# Patient Record
Sex: FEMALE | Race: WHITE
[De-identification: names, ages, dates, MRNs, and addresses within clinical notes are randomized per-mention and may not be internally consistent; named-entity substitution may affect disease eponyms.]

---

## 2022-02-17 ENCOUNTER — HOSPITAL ENCOUNTER (INPATIENT)
Dept: HOSPITAL 54 - ER | Age: 77
LOS: 2 days | Discharge: HOME | DRG: 689 | End: 2022-02-19
Attending: NURSE PRACTITIONER | Admitting: NURSE PRACTITIONER
Payer: MEDICARE

## 2022-02-17 VITALS — SYSTOLIC BLOOD PRESSURE: 145 MMHG | DIASTOLIC BLOOD PRESSURE: 89 MMHG

## 2022-02-17 VITALS — SYSTOLIC BLOOD PRESSURE: 161 MMHG | DIASTOLIC BLOOD PRESSURE: 83 MMHG

## 2022-02-17 VITALS — BODY MASS INDEX: 21.6 KG/M2 | WEIGHT: 110 LBS | HEIGHT: 60 IN

## 2022-02-17 DIAGNOSIS — Z86.73: ICD-10-CM

## 2022-02-17 DIAGNOSIS — F17.200: ICD-10-CM

## 2022-02-17 DIAGNOSIS — E78.5: ICD-10-CM

## 2022-02-17 DIAGNOSIS — N39.0: ICD-10-CM

## 2022-02-17 DIAGNOSIS — R73.9: ICD-10-CM

## 2022-02-17 DIAGNOSIS — E88.09: ICD-10-CM

## 2022-02-17 DIAGNOSIS — I49.9: ICD-10-CM

## 2022-02-17 DIAGNOSIS — E86.0: ICD-10-CM

## 2022-02-17 DIAGNOSIS — N12: Primary | ICD-10-CM

## 2022-02-17 DIAGNOSIS — F41.9: ICD-10-CM

## 2022-02-17 DIAGNOSIS — Z79.02: ICD-10-CM

## 2022-02-17 DIAGNOSIS — J98.11: ICD-10-CM

## 2022-02-17 DIAGNOSIS — I10: ICD-10-CM

## 2022-02-17 DIAGNOSIS — N20.0: ICD-10-CM

## 2022-02-17 DIAGNOSIS — G93.41: ICD-10-CM

## 2022-02-17 DIAGNOSIS — K44.9: ICD-10-CM

## 2022-02-17 DIAGNOSIS — F01.50: ICD-10-CM

## 2022-02-17 DIAGNOSIS — D68.59: ICD-10-CM

## 2022-02-17 DIAGNOSIS — Z79.899: ICD-10-CM

## 2022-02-17 DIAGNOSIS — E83.42: ICD-10-CM

## 2022-02-17 DIAGNOSIS — B96.21: ICD-10-CM

## 2022-02-17 DIAGNOSIS — Z87.81: ICD-10-CM

## 2022-02-17 DIAGNOSIS — G89.29: ICD-10-CM

## 2022-02-17 DIAGNOSIS — M41.9: ICD-10-CM

## 2022-02-17 DIAGNOSIS — Z86.79: ICD-10-CM

## 2022-02-17 DIAGNOSIS — I77.811: ICD-10-CM

## 2022-02-17 DIAGNOSIS — I70.0: ICD-10-CM

## 2022-02-17 DIAGNOSIS — E44.1: ICD-10-CM

## 2022-02-17 DIAGNOSIS — Z98.890: ICD-10-CM

## 2022-02-17 DIAGNOSIS — M48.54XA: ICD-10-CM

## 2022-02-17 DIAGNOSIS — Z20.822: ICD-10-CM

## 2022-02-17 DIAGNOSIS — Z79.82: ICD-10-CM

## 2022-02-17 LAB
ALBUMIN SERPL BCP-MCNC: 3.6 G/DL (ref 3.4–5)
ALP SERPL-CCNC: 78 U/L (ref 46–116)
ALT SERPL W P-5'-P-CCNC: 29 U/L (ref 12–78)
AST SERPL W P-5'-P-CCNC: 24 U/L (ref 15–37)
BASOPHILS # BLD AUTO: 0.1 K/UL (ref 0–0.2)
BASOPHILS NFR BLD AUTO: 0.5 % (ref 0–2)
BILIRUB DIRECT SERPL-MCNC: 0.1 MG/DL (ref 0–0.2)
BILIRUB SERPL-MCNC: 0.6 MG/DL (ref 0.2–1)
BILIRUB UR QL STRIP: (no result)
BUN SERPL-MCNC: 16 MG/DL (ref 7–18)
CALCIUM SERPL-MCNC: 10.2 MG/DL (ref 8.5–10.1)
CHLORIDE SERPL-SCNC: 103 MMOL/L (ref 98–107)
CO2 SERPL-SCNC: 29 MMOL/L (ref 21–32)
COLOR UR: YELLOW
CREAT SERPL-MCNC: 0.9 MG/DL (ref 0.6–1.3)
EOSINOPHIL NFR BLD AUTO: 1 % (ref 0–6)
GLUCOSE SERPL-MCNC: 143 MG/DL (ref 74–106)
GLUCOSE UR STRIP-MCNC: NEGATIVE MG/DL
HCT VFR BLD AUTO: 39 % (ref 33–45)
HGB BLD-MCNC: 13.1 G/DL (ref 11.5–14.8)
LEUKOCYTE ESTERASE UR QL STRIP: (no result)
LYMPHOCYTES NFR BLD AUTO: 18.8 % (ref 20–44)
LYMPHOCYTES NFR BLD AUTO: 2.1 K/UL (ref 0.8–4.8)
MCHC RBC AUTO-ENTMCNC: 33 G/DL (ref 31–36)
MCV RBC AUTO: 87 FL (ref 82–100)
MONOCYTES NFR BLD AUTO: 0.8 K/UL (ref 0.1–1.3)
MONOCYTES NFR BLD AUTO: 7.5 % (ref 2–12)
NEUTROPHILS # BLD AUTO: 7.9 K/UL (ref 1.8–8.9)
NEUTROPHILS NFR BLD AUTO: 72.2 % (ref 43–81)
NITRITE UR QL STRIP: POSITIVE
PH UR STRIP: 6 [PH] (ref 5–8)
PLATELET # BLD AUTO: 300 K/UL (ref 150–450)
POTASSIUM SERPL-SCNC: 4.1 MMOL/L (ref 3.5–5.1)
PROT SERPL-MCNC: 7.8 G/DL (ref 6.4–8.2)
PROT UR QL STRIP: 30 MG/DL
RBC # BLD AUTO: 4.51 MIL/UL (ref 4–5.2)
RBC #/AREA URNS HPF: (no result) /HPF (ref 0–2)
SODIUM SERPL-SCNC: 140 MMOL/L (ref 136–145)
UROBILINOGEN UR STRIP-MCNC: 0.2 EU/DL
WBC #/AREA URNS HPF: (no result) /HPF (ref 0–3)
WBC NRBC COR # BLD AUTO: 11 K/UL (ref 4.3–11)

## 2022-02-17 PROCEDURE — G0378 HOSPITAL OBSERVATION PER HR: HCPCS

## 2022-02-17 PROCEDURE — C9803 HOPD COVID-19 SPEC COLLECT: HCPCS

## 2022-02-17 RX ADMIN — ATORVASTATIN CALCIUM SCH MG: 40 TABLET, FILM COATED ORAL at 21:57

## 2022-02-17 RX ADMIN — DEXTROSE MONOHYDRATE SCH MLS/HR: 50 INJECTION, SOLUTION INTRAVENOUS at 17:45

## 2022-02-17 RX ADMIN — ENOXAPARIN SODIUM SCH MG: 40 INJECTION SUBCUTANEOUS at 17:46

## 2022-02-18 VITALS — DIASTOLIC BLOOD PRESSURE: 70 MMHG | SYSTOLIC BLOOD PRESSURE: 152 MMHG

## 2022-02-18 VITALS — DIASTOLIC BLOOD PRESSURE: 77 MMHG | SYSTOLIC BLOOD PRESSURE: 156 MMHG

## 2022-02-18 VITALS — SYSTOLIC BLOOD PRESSURE: 145 MMHG | DIASTOLIC BLOOD PRESSURE: 82 MMHG

## 2022-02-18 VITALS — SYSTOLIC BLOOD PRESSURE: 146 MMHG | DIASTOLIC BLOOD PRESSURE: 73 MMHG

## 2022-02-18 VITALS — DIASTOLIC BLOOD PRESSURE: 82 MMHG | SYSTOLIC BLOOD PRESSURE: 145 MMHG

## 2022-02-18 VITALS — DIASTOLIC BLOOD PRESSURE: 65 MMHG | SYSTOLIC BLOOD PRESSURE: 136 MMHG

## 2022-02-18 VITALS — DIASTOLIC BLOOD PRESSURE: 76 MMHG | SYSTOLIC BLOOD PRESSURE: 170 MMHG

## 2022-02-18 LAB
ALBUMIN SERPL BCP-MCNC: 3.1 G/DL (ref 3.4–5)
ALP SERPL-CCNC: 70 U/L (ref 46–116)
ALT SERPL W P-5'-P-CCNC: 25 U/L (ref 12–78)
AST SERPL W P-5'-P-CCNC: 21 U/L (ref 15–37)
BASOPHILS # BLD AUTO: 0.1 K/UL (ref 0–0.2)
BASOPHILS NFR BLD AUTO: 0.6 % (ref 0–2)
BILIRUB SERPL-MCNC: 0.6 MG/DL (ref 0.2–1)
BUN SERPL-MCNC: 12 MG/DL (ref 7–18)
CALCIUM SERPL-MCNC: 9.3 MG/DL (ref 8.5–10.1)
CHLORIDE SERPL-SCNC: 104 MMOL/L (ref 98–107)
CHOLEST SERPL-MCNC: 220 MG/DL (ref ?–200)
CO2 SERPL-SCNC: 22 MMOL/L (ref 21–32)
CREAT SERPL-MCNC: 0.7 MG/DL (ref 0.6–1.3)
EOSINOPHIL NFR BLD AUTO: 1.3 % (ref 0–6)
GLUCOSE SERPL-MCNC: 100 MG/DL (ref 74–106)
HCT VFR BLD AUTO: 38 % (ref 33–45)
HDLC SERPL-MCNC: 47 MG/DL (ref 40–60)
HGB BLD-MCNC: 12.8 G/DL (ref 11.5–14.8)
LDLC SERPL DIRECT ASSAY-MCNC: 142 MG/DL (ref 0–99)
LYMPHOCYTES NFR BLD AUTO: 2 K/UL (ref 0.8–4.8)
LYMPHOCYTES NFR BLD AUTO: 21.2 % (ref 20–44)
MAGNESIUM SERPL-MCNC: 1.6 MG/DL (ref 1.8–2.4)
MCHC RBC AUTO-ENTMCNC: 34 G/DL (ref 31–36)
MCV RBC AUTO: 87 FL (ref 82–100)
MONOCYTES NFR BLD AUTO: 0.9 K/UL (ref 0.1–1.3)
MONOCYTES NFR BLD AUTO: 9.5 % (ref 2–12)
NEUTROPHILS # BLD AUTO: 6.4 K/UL (ref 1.8–8.9)
NEUTROPHILS NFR BLD AUTO: 67.4 % (ref 43–81)
PHOSPHATE SERPL-MCNC: 3.4 MG/DL (ref 2.5–4.9)
PLATELET # BLD AUTO: 277 K/UL (ref 150–450)
POTASSIUM SERPL-SCNC: 3.6 MMOL/L (ref 3.5–5.1)
PROT SERPL-MCNC: 7.1 G/DL (ref 6.4–8.2)
RBC # BLD AUTO: 4.35 MIL/UL (ref 4–5.2)
SODIUM SERPL-SCNC: 138 MMOL/L (ref 136–145)
TRIGL SERPL-MCNC: 132 MG/DL (ref 30–150)
WBC NRBC COR # BLD AUTO: 9.5 K/UL (ref 4.3–11)

## 2022-02-18 RX ADMIN — ACETAMINOPHEN PRN MG: 325 TABLET ORAL at 21:48

## 2022-02-18 RX ADMIN — MAGNESIUM SULFATE IN DEXTROSE SCH MLS/HR: 10 INJECTION, SOLUTION INTRAVENOUS at 11:06

## 2022-02-18 RX ADMIN — PANTOPRAZOLE SODIUM SCH MG: 40 TABLET, DELAYED RELEASE ORAL at 08:52

## 2022-02-18 RX ADMIN — ACETAMINOPHEN PRN MG: 325 TABLET ORAL at 06:26

## 2022-02-18 RX ADMIN — ATENOLOL SCH MG: 50 TABLET ORAL at 08:53

## 2022-02-18 RX ADMIN — LOSARTAN POTASSIUM SCH MG: 50 TABLET, FILM COATED ORAL at 08:53

## 2022-02-18 RX ADMIN — ATORVASTATIN CALCIUM SCH MG: 40 TABLET, FILM COATED ORAL at 21:17

## 2022-02-18 RX ADMIN — NICOTINE SCH MG: 7 PATCH, EXTENDED RELEASE TOPICAL at 08:53

## 2022-02-18 RX ADMIN — ENOXAPARIN SODIUM SCH MG: 40 INJECTION SUBCUTANEOUS at 16:28

## 2022-02-18 RX ADMIN — DEXTROSE MONOHYDRATE SCH MLS/HR: 50 INJECTION, SOLUTION INTRAVENOUS at 16:28

## 2022-02-18 RX ADMIN — ACETAMINOPHEN PRN MG: 325 TABLET ORAL at 15:33

## 2022-02-18 RX ADMIN — MAGNESIUM SULFATE IN DEXTROSE SCH MLS/HR: 10 INJECTION, SOLUTION INTRAVENOUS at 09:40

## 2022-02-19 VITALS — DIASTOLIC BLOOD PRESSURE: 71 MMHG | SYSTOLIC BLOOD PRESSURE: 145 MMHG

## 2022-02-19 VITALS — DIASTOLIC BLOOD PRESSURE: 74 MMHG | SYSTOLIC BLOOD PRESSURE: 139 MMHG

## 2022-02-19 VITALS — SYSTOLIC BLOOD PRESSURE: 137 MMHG | DIASTOLIC BLOOD PRESSURE: 73 MMHG

## 2022-02-19 LAB
BASOPHILS # BLD AUTO: 0.1 K/UL (ref 0–0.2)
BASOPHILS NFR BLD AUTO: 0.7 % (ref 0–2)
BUN SERPL-MCNC: 14 MG/DL (ref 7–18)
CALCIUM SERPL-MCNC: 9.6 MG/DL (ref 8.5–10.1)
CHLORIDE SERPL-SCNC: 105 MMOL/L (ref 98–107)
CO2 SERPL-SCNC: 23 MMOL/L (ref 21–32)
CREAT SERPL-MCNC: 0.7 MG/DL (ref 0.6–1.3)
EOSINOPHIL NFR BLD AUTO: 1.6 % (ref 0–6)
GLUCOSE SERPL-MCNC: 99 MG/DL (ref 74–106)
HCT VFR BLD AUTO: 38 % (ref 33–45)
HGB BLD-MCNC: 12.8 G/DL (ref 11.5–14.8)
LYMPHOCYTES NFR BLD AUTO: 2 K/UL (ref 0.8–4.8)
LYMPHOCYTES NFR BLD AUTO: 21.6 % (ref 20–44)
MAGNESIUM SERPL-MCNC: 2 MG/DL (ref 1.8–2.4)
MCHC RBC AUTO-ENTMCNC: 33 G/DL (ref 31–36)
MCV RBC AUTO: 88 FL (ref 82–100)
MONOCYTES NFR BLD AUTO: 0.8 K/UL (ref 0.1–1.3)
MONOCYTES NFR BLD AUTO: 9.2 % (ref 2–12)
NEUTROPHILS # BLD AUTO: 6.1 K/UL (ref 1.8–8.9)
NEUTROPHILS NFR BLD AUTO: 66.9 % (ref 43–81)
PHOSPHATE SERPL-MCNC: 3.6 MG/DL (ref 2.5–4.9)
PLATELET # BLD AUTO: 288 K/UL (ref 150–450)
POTASSIUM SERPL-SCNC: 3.7 MMOL/L (ref 3.5–5.1)
RBC # BLD AUTO: 4.39 MIL/UL (ref 4–5.2)
SODIUM SERPL-SCNC: 138 MMOL/L (ref 136–145)
WBC NRBC COR # BLD AUTO: 9.2 K/UL (ref 4.3–11)

## 2022-02-19 RX ADMIN — DEXTROSE MONOHYDRATE SCH MLS/HR: 50 INJECTION, SOLUTION INTRAVENOUS at 16:08

## 2022-02-19 RX ADMIN — ENOXAPARIN SODIUM SCH MG: 40 INJECTION SUBCUTANEOUS at 16:47

## 2022-02-19 RX ADMIN — PANTOPRAZOLE SODIUM SCH MG: 40 TABLET, DELAYED RELEASE ORAL at 08:22

## 2022-02-19 RX ADMIN — NICOTINE SCH MG: 7 PATCH, EXTENDED RELEASE TOPICAL at 08:23

## 2022-02-19 RX ADMIN — LOSARTAN POTASSIUM SCH MG: 50 TABLET, FILM COATED ORAL at 08:22

## 2022-02-19 RX ADMIN — ATENOLOL SCH MG: 50 TABLET ORAL at 09:29

## 2022-06-01 ENCOUNTER — HOSPITAL ENCOUNTER (EMERGENCY)
Dept: HOSPITAL 54 - ER | Age: 77
Discharge: ANOTHER HOSPITAL | End: 2022-06-01
Payer: MEDICARE

## 2022-06-01 VITALS — WEIGHT: 105 LBS | BODY MASS INDEX: 19.32 KG/M2 | HEIGHT: 62 IN

## 2022-06-01 VITALS — DIASTOLIC BLOOD PRESSURE: 75 MMHG | SYSTOLIC BLOOD PRESSURE: 170 MMHG

## 2022-06-01 DIAGNOSIS — R94.31: ICD-10-CM

## 2022-06-01 DIAGNOSIS — M54.9: ICD-10-CM

## 2022-06-01 DIAGNOSIS — I10: ICD-10-CM

## 2022-06-01 DIAGNOSIS — I65.21: ICD-10-CM

## 2022-06-01 DIAGNOSIS — R53.1: Primary | ICD-10-CM

## 2022-06-01 DIAGNOSIS — R47.1: ICD-10-CM

## 2022-06-01 DIAGNOSIS — G89.29: ICD-10-CM

## 2022-06-01 DIAGNOSIS — Z86.73: ICD-10-CM

## 2022-06-01 DIAGNOSIS — F17.200: ICD-10-CM

## 2022-06-01 DIAGNOSIS — Z79.899: ICD-10-CM

## 2022-06-01 DIAGNOSIS — Z20.822: ICD-10-CM

## 2022-06-01 LAB
BASOPHILS # BLD AUTO: 0.1 K/UL (ref 0–0.2)
BASOPHILS NFR BLD AUTO: 0.7 % (ref 0–2)
BUN SERPL-MCNC: 14 MG/DL (ref 7–18)
CALCIUM SERPL-MCNC: 9.7 MG/DL (ref 8.5–10.1)
CHLORIDE SERPL-SCNC: 105 MMOL/L (ref 98–107)
CO2 SERPL-SCNC: 25 MMOL/L (ref 21–32)
CREAT SERPL-MCNC: 0.8 MG/DL (ref 0.6–1.3)
EOSINOPHIL NFR BLD AUTO: 2 % (ref 0–6)
GLUCOSE SERPL-MCNC: 105 MG/DL (ref 74–106)
HCT VFR BLD AUTO: 40 % (ref 33–45)
HGB BLD-MCNC: 13.2 G/DL (ref 11.5–14.8)
LYMPHOCYTES NFR BLD AUTO: 3.7 K/UL (ref 0.8–4.8)
LYMPHOCYTES NFR BLD AUTO: 39.8 % (ref 20–44)
MCHC RBC AUTO-ENTMCNC: 33 G/DL (ref 31–36)
MCV RBC AUTO: 87 FL (ref 82–100)
MONOCYTES NFR BLD AUTO: 0.7 K/UL (ref 0.1–1.3)
MONOCYTES NFR BLD AUTO: 7.6 % (ref 2–12)
NEUTROPHILS # BLD AUTO: 4.6 K/UL (ref 1.8–8.9)
NEUTROPHILS NFR BLD AUTO: 49.9 % (ref 43–81)
PLATELET # BLD AUTO: 280 K/UL (ref 150–450)
POTASSIUM SERPL-SCNC: 3.5 MMOL/L (ref 3.5–5.1)
RBC # BLD AUTO: 4.6 MIL/UL (ref 4–5.2)
SODIUM SERPL-SCNC: 143 MMOL/L (ref 136–145)
WBC NRBC COR # BLD AUTO: 9.2 K/UL (ref 4.3–11)

## 2022-06-01 PROCEDURE — 70496 CT ANGIOGRAPHY HEAD: CPT

## 2022-06-01 PROCEDURE — 85730 THROMBOPLASTIN TIME PARTIAL: CPT

## 2022-06-01 PROCEDURE — 80048 BASIC METABOLIC PNL TOTAL CA: CPT

## 2022-06-01 PROCEDURE — 99292 CRITICAL CARE ADDL 30 MIN: CPT

## 2022-06-01 PROCEDURE — C9803 HOPD COVID-19 SPEC COLLECT: HCPCS

## 2022-06-01 PROCEDURE — 82962 GLUCOSE BLOOD TEST: CPT

## 2022-06-01 PROCEDURE — 70498 CT ANGIOGRAPHY NECK: CPT

## 2022-06-01 PROCEDURE — 70450 CT HEAD/BRAIN W/O DYE: CPT

## 2022-06-01 PROCEDURE — 84484 ASSAY OF TROPONIN QUANT: CPT

## 2022-06-01 PROCEDURE — 99291 CRITICAL CARE FIRST HOUR: CPT

## 2022-06-01 PROCEDURE — 36415 COLL VENOUS BLD VENIPUNCTURE: CPT

## 2022-06-01 PROCEDURE — 87426 SARSCOV CORONAVIRUS AG IA: CPT

## 2022-06-01 PROCEDURE — 93005 ELECTROCARDIOGRAM TRACING: CPT

## 2022-06-01 PROCEDURE — 96365 THER/PROPH/DIAG IV INF INIT: CPT

## 2022-06-01 PROCEDURE — 85025 COMPLETE CBC W/AUTO DIFF WBC: CPT

## 2022-06-01 PROCEDURE — 71045 X-RAY EXAM CHEST 1 VIEW: CPT

## 2022-06-01 NOTE — NUR
REPORT GIVEN TO [facility] OF CRITICAL CARE TRANSPORT. PATIENT TRANSPORTED TO [facility] AAOX4. NO SOB, NO CP DISTRESS, WITH ONGOING IV TRANSFUSION OF 
TPA @39MG/HR. PATIENT IS BEING ACCOMPANIED  BY FAMILY MEMBER.

## 2022-06-01 NOTE — NUR
CALLED [facility] [phone] INFORMING OF POSSIBLE CODE STROKE THAT MAY 
NEED TRANSPORT.

## 2022-06-01 NOTE — NUR
ALTEPASE DRIP STARTED AT 38MG/HR. PATIENT IS BEING MONITORED FOR SIGNS OF 
BLEEDING.

-------------------------------------------------------------------------------

Addendum: 06/01/22 at 1905 by CELIO

-------------------------------------------------------------------------------

ALTEPASE STARTED AT 39MG/HR. PATIENT IS BEING MONITORED FOR SIGNS OF BLEEDING.

## 2022-06-01 NOTE — NUR
BIBFAMILY THIS 78YO FEMALE PATIENT WITH CC OF SLURRING OF SPEECH, LEFT SIDED 
WEAKNESS AND LEFT FACIAL DROOPING. PATIENT IS ALERT, ORIENTED X4. PATIENT LAST 
KNOWN WELL 45MINS AGO. ATTACHED TO CARDIAC MONITOR. VITALS SIGNS BEING 
MONITORED.

## 2022-11-07 ENCOUNTER — HOSPITAL ENCOUNTER (INPATIENT)
Dept: HOSPITAL 54 - ER | Age: 77
LOS: 15 days | Discharge: NURSING, REHAB OR LONG-TERM CARE | DRG: 371 | End: 2022-11-22
Attending: LEGAL MEDICINE | Admitting: LEGAL MEDICINE
Payer: MEDICARE

## 2022-11-07 VITALS — BODY MASS INDEX: 19.15 KG/M2 | HEIGHT: 59 IN | WEIGHT: 95 LBS

## 2022-11-07 VITALS — SYSTOLIC BLOOD PRESSURE: 104 MMHG | DIASTOLIC BLOOD PRESSURE: 72 MMHG

## 2022-11-07 VITALS — SYSTOLIC BLOOD PRESSURE: 131 MMHG | DIASTOLIC BLOOD PRESSURE: 75 MMHG

## 2022-11-07 DIAGNOSIS — E78.5: ICD-10-CM

## 2022-11-07 DIAGNOSIS — Z79.82: ICD-10-CM

## 2022-11-07 DIAGNOSIS — F03.90: ICD-10-CM

## 2022-11-07 DIAGNOSIS — L97.519: ICD-10-CM

## 2022-11-07 DIAGNOSIS — G89.29: ICD-10-CM

## 2022-11-07 DIAGNOSIS — L03.031: ICD-10-CM

## 2022-11-07 DIAGNOSIS — Z79.899: ICD-10-CM

## 2022-11-07 DIAGNOSIS — G92.8: ICD-10-CM

## 2022-11-07 DIAGNOSIS — J44.9: ICD-10-CM

## 2022-11-07 DIAGNOSIS — F01.54: ICD-10-CM

## 2022-11-07 DIAGNOSIS — H81.10: ICD-10-CM

## 2022-11-07 DIAGNOSIS — I69.398: ICD-10-CM

## 2022-11-07 DIAGNOSIS — M54.50: ICD-10-CM

## 2022-11-07 DIAGNOSIS — F01.53: ICD-10-CM

## 2022-11-07 DIAGNOSIS — Z79.4: ICD-10-CM

## 2022-11-07 DIAGNOSIS — E11.621: ICD-10-CM

## 2022-11-07 DIAGNOSIS — F33.9: ICD-10-CM

## 2022-11-07 DIAGNOSIS — R13.10: ICD-10-CM

## 2022-11-07 DIAGNOSIS — A04.72: Primary | ICD-10-CM

## 2022-11-07 DIAGNOSIS — F01.518: ICD-10-CM

## 2022-11-07 DIAGNOSIS — Z22.322: ICD-10-CM

## 2022-11-07 DIAGNOSIS — M20.11: ICD-10-CM

## 2022-11-07 DIAGNOSIS — I69.391: ICD-10-CM

## 2022-11-07 DIAGNOSIS — I10: ICD-10-CM

## 2022-11-07 LAB
ALBUMIN SERPL BCP-MCNC: 3.4 G/DL (ref 3.4–5)
ALP SERPL-CCNC: 128 U/L (ref 46–116)
ALT SERPL W P-5'-P-CCNC: 40 U/L (ref 12–78)
AST SERPL W P-5'-P-CCNC: 40 U/L (ref 15–37)
BASOPHILS # BLD AUTO: 0.1 K/UL (ref 0–0.2)
BASOPHILS NFR BLD AUTO: 0.7 % (ref 0–2)
BILIRUB DIRECT SERPL-MCNC: 0.2 MG/DL (ref 0–0.2)
BILIRUB SERPL-MCNC: 0.8 MG/DL (ref 0.2–1)
BILIRUB UR QL STRIP: NEGATIVE
BUN SERPL-MCNC: 23 MG/DL (ref 7–18)
CALCIUM SERPL-MCNC: 10.8 MG/DL (ref 8.5–10.1)
CHLORIDE SERPL-SCNC: 103 MMOL/L (ref 98–107)
CO2 SERPL-SCNC: 30 MMOL/L (ref 21–32)
COLOR UR: YELLOW
CREAT SERPL-MCNC: 0.7 MG/DL (ref 0.6–1.3)
EOSINOPHIL NFR BLD AUTO: 1 % (ref 0–6)
GLUCOSE SERPL-MCNC: 106 MG/DL (ref 74–106)
GLUCOSE UR STRIP-MCNC: NEGATIVE MG/DL
HCT VFR BLD AUTO: 44 % (ref 33–45)
HGB BLD-MCNC: 14.1 G/DL (ref 11.5–14.8)
LEUKOCYTE ESTERASE UR QL STRIP: NEGATIVE
LYMPHOCYTES NFR BLD AUTO: 18.6 % (ref 20–44)
LYMPHOCYTES NFR BLD AUTO: 2.1 K/UL (ref 0.8–4.8)
MCHC RBC AUTO-ENTMCNC: 32 G/DL (ref 31–36)
MCV RBC AUTO: 83 FL (ref 82–100)
MONOCYTES NFR BLD AUTO: 1 K/UL (ref 0.1–1.3)
MONOCYTES NFR BLD AUTO: 8.4 % (ref 2–12)
NEUTROPHILS # BLD AUTO: 8.1 K/UL (ref 1.8–8.9)
NEUTROPHILS NFR BLD AUTO: 71.3 % (ref 43–81)
NITRITE UR QL STRIP: NEGATIVE
PH UR STRIP: 8 [PH] (ref 5–8)
PLATELET # BLD AUTO: 299 K/UL (ref 150–450)
POTASSIUM SERPL-SCNC: 3.9 MMOL/L (ref 3.5–5.1)
PROT SERPL-MCNC: 8 G/DL (ref 6.4–8.2)
PROT UR QL STRIP: NEGATIVE MG/DL
RBC # BLD AUTO: 5.31 MIL/UL (ref 4–5.2)
SODIUM SERPL-SCNC: 141 MMOL/L (ref 136–145)
UROBILINOGEN UR STRIP-MCNC: 0.2 EU/DL
WBC NRBC COR # BLD AUTO: 11.3 K/UL (ref 4.3–11)

## 2022-11-07 PROCEDURE — C9113 INJ PANTOPRAZOLE SODIUM, VIA: HCPCS

## 2022-11-07 PROCEDURE — G0378 HOSPITAL OBSERVATION PER HR: HCPCS

## 2022-11-07 PROCEDURE — C9803 HOPD COVID-19 SPEC COLLECT: HCPCS

## 2022-11-07 RX ADMIN — DEXTROSE AND SODIUM CHLORIDE SCH MLS/HR: 5; 900 INJECTION, SOLUTION INTRAVENOUS at 18:07

## 2022-11-07 RX ADMIN — Medication SCH EACH: at 21:39

## 2022-11-07 RX ADMIN — Medication SCH EACH: at 17:24

## 2022-11-07 RX ADMIN — VANCOMYCIN HYDROCHLORIDE SCH MG: 125 CAPSULE ORAL at 23:36

## 2022-11-07 RX ADMIN — ATORVASTATIN CALCIUM SCH MG: 40 TABLET, FILM COATED ORAL at 21:30

## 2022-11-07 RX ADMIN — VANCOMYCIN HYDROCHLORIDE SCH MG: 125 CAPSULE ORAL at 19:12

## 2022-11-07 RX ADMIN — SERTRALINE HYDROCHLORIDE SCH MG: 25 TABLET, FILM COATED ORAL at 18:14

## 2022-11-07 RX ADMIN — ASPIRIN 81 MG SCH MG: 81 TABLET ORAL at 18:14

## 2022-11-07 NOTE — NUR
Received pt 77 yrs female came by lemuel c/o  genralized weekness and diarrhea 
awake fallow command  respiration spont and easy

## 2022-11-07 NOTE — NUR
MS RN ADMISSION NOTES

RECEIVED PATIENT VIA GURNEY FROM ER. PATIENT IS ALERT AND ORIENTED TIMES 3 NO PAIN NOTED. NO 
SOB NOTED. NO DISTRESS NOTED. VITAL SIGNS IN NOR,MAL RANGES.ABLE TO MAKE NEEDS KNOWN IN 
Somali LANGUAGE. IV ACCESS ON THE RAC #22 INTACT AND FUSING D5NS AT 75 ML/HR. OVERALL SKIN 
INTACT. GTUBE PLACEMENT CHECKED. I PLACE. NO RESIDUAL NOTED. PATIENT IS LEGALLY BLIND. 
BELONGINGS ACCOUNTED AND SIGNED FOR. CALLED THE SON KHOA FOR VACCINE INFORMATION AND GENERAL 
INFO ABOUT THE PATIENT. ALL SAFETY MEASURES IN PLACE. BED LOCKED IN THE LOWEST POSITION. 
CALL LIGHT AND TABLE IN EASY REACH. HEAD OF THE BED ELEVATED FOR ASPIRATION PRECAUTION. WILL 
CONTINUE TO MONITOR CLOSELY.

## 2022-11-07 NOTE — NUR
MS RN CLOSING NOTES

PATIENT IS ALERT AND ORIENTED TIMES 3 AND AWAKE IN BED.NO PAIN NOTED. NO SOB NOTED. NO 
DISTRESS NOTED. VITAL SIGNS IN NOR,MAL RANGES.ABLE TO MAKE NEEDS KNOWN IN Kiswahili LANGUAGE. 
IV ACCESS ON THE RAC #22 INTACT AND FUSING D5NS AT 75 ML/HR. OVERALL SKIN INTACT. GTUBE 
PLACEMENT CHECKED. IN PLACE. NO RESIDUAL NOTED. DUE MEDS GIVEN AS ORDERED.PATIENT IS LEGALLY 
BLIND. ALL SAFETY MEASURES IN PLACE. BED ALARM ON. BED LOCKED IN THE LOWEST POSITION. CALL 
LIGHT AND TABLE IN EASY REACH. HEAD OF THE BED ELEVATED FOR ASPIRATION PRECAUTION. ENDORSED 
INCOMING SHIFT NURSE FOR CRYSTAL.

## 2022-11-08 VITALS — SYSTOLIC BLOOD PRESSURE: 127 MMHG | DIASTOLIC BLOOD PRESSURE: 56 MMHG

## 2022-11-08 VITALS — SYSTOLIC BLOOD PRESSURE: 141 MMHG | DIASTOLIC BLOOD PRESSURE: 76 MMHG

## 2022-11-08 RX ADMIN — DEXTROSE AND SODIUM CHLORIDE SCH MLS/HR: 5; 900 INJECTION, SOLUTION INTRAVENOUS at 05:09

## 2022-11-08 RX ADMIN — TRAMADOL HYDROCHLORIDE SCH MG: 50 TABLET, FILM COATED ORAL at 16:34

## 2022-11-08 RX ADMIN — Medication PRN ML: at 19:05

## 2022-11-08 RX ADMIN — INSULIN HUMAN PRN UNITS: 100 INJECTION, SOLUTION PARENTERAL at 21:46

## 2022-11-08 RX ADMIN — Medication SCH EACH: at 12:40

## 2022-11-08 RX ADMIN — DEXTROSE AND SODIUM CHLORIDE SCH MLS/HR: 5; 900 INJECTION, SOLUTION INTRAVENOUS at 19:10

## 2022-11-08 RX ADMIN — VANCOMYCIN HYDROCHLORIDE SCH MG: 125 CAPSULE ORAL at 18:52

## 2022-11-08 RX ADMIN — LOSARTAN POTASSIUM SCH MG: 50 TABLET, FILM COATED ORAL at 08:27

## 2022-11-08 RX ADMIN — INSULIN HUMAN PRN UNITS: 100 INJECTION, SOLUTION PARENTERAL at 05:44

## 2022-11-08 RX ADMIN — AMLODIPINE BESYLATE SCH MG: 5 TABLET ORAL at 08:27

## 2022-11-08 RX ADMIN — LOSARTAN POTASSIUM SCH MG: 50 TABLET, FILM COATED ORAL at 16:34

## 2022-11-08 RX ADMIN — ASPIRIN 81 MG SCH MG: 81 TABLET ORAL at 17:18

## 2022-11-08 RX ADMIN — ACETAMINOPHEN SCH MG: 160 SOLUTION ORAL at 08:25

## 2022-11-08 RX ADMIN — SODIUM CHLORIDE SCH MG: 9 INJECTION, SOLUTION INTRAVENOUS at 08:28

## 2022-11-08 RX ADMIN — VANCOMYCIN HYDROCHLORIDE SCH MG: 125 CAPSULE ORAL at 12:40

## 2022-11-08 RX ADMIN — SERTRALINE HYDROCHLORIDE SCH MG: 25 TABLET, FILM COATED ORAL at 18:53

## 2022-11-08 RX ADMIN — Medication SCH EACH: at 17:22

## 2022-11-08 RX ADMIN — Medication SCH EACH: at 21:45

## 2022-11-08 RX ADMIN — VANCOMYCIN HYDROCHLORIDE SCH MG: 125 CAPSULE ORAL at 06:00

## 2022-11-08 RX ADMIN — Medication SCH EACH: at 05:39

## 2022-11-08 RX ADMIN — LORAZEPAM PRN MG: 1 TABLET ORAL at 22:55

## 2022-11-08 RX ADMIN — TRAMADOL HYDROCHLORIDE SCH MG: 50 TABLET, FILM COATED ORAL at 08:26

## 2022-11-08 RX ADMIN — ATORVASTATIN CALCIUM SCH MG: 40 TABLET, FILM COATED ORAL at 21:33

## 2022-11-08 NOTE — NUR
Vanco 250 mg /per GT not given

REASON PER PHARMACIST:  to be given Later D/T none available at this time 

will relay this message to the AM shift to give the NOON dose early

## 2022-11-08 NOTE — NUR
Closing Notes: alert to nurse at her bedside

She is legally Blind.  She will ask "who is there"



"What time is it"

she is changing her own position in the bed

sleeping with her legs up and close to her abd

 bedalarm in use for safety

GT flushes w/o problem towel wrapped aroung the abd over the GT as a binder to prevent pull 
out

in continent UA

## 2022-11-08 NOTE — NUR
MS RN OPENING  NOTES



RECEIVED PATIENT ASLEEP, EASILY ROUSED,  A/OX1, ORIENTED TO PERSON. NO SOB AND DISTRESS 
NOTED. IV ACCESS ON THE RAC #20 INTACT, INFUSING D5NS AT 75 ML/HR. GTUBE PLACEMENT NOTED. 
PATIENT IS LEGALLY BLIND. PENDING STOOL SAMPLE COLLECTION.  ALL SAFETY MEASURES IN PLACE. 
BED ALARM ON. BED LOCKED IN THE LOWEST POSITION. CALL LIGHT AND TABLE IN EASY REACH, WILL 
CONT WITH PLAN OF CARE DURING SHIFT. 

-------------------------------------------------------------------------------

Addendum: 11/08/22 at 0849 by ROSE MENDES RN

-------------------------------------------------------------------------------

PT IS ON 2 L OF O2 VIA NC

## 2022-11-08 NOTE — NUR
MS RN NOTES:



SENT MESSAGE TO MD FOR GTF ORDER. GLUCERNA 30ML/HR STARTED 11/8 @ 1840, D5NS PAUSED.

## 2022-11-08 NOTE — NUR
RESTLESS/AGITATION 

Patient in bed, restless. Confused, pulled out IV line. Agitated, unable to sleep. Given 
Ativan via GT, will reassess. Fall precaution maintained.

## 2022-11-08 NOTE — NUR
MS RN OPENING  NOTES



PATIENT AWAKE,  A/OX1, ORIENTED TO PERSON. PT IS ON 2L O2, NO SOB AND DISTRESS NOTED.  IV 
ACCESS ON THE RAC #20 INTACT, SL. PATIENT IS LEGALLY BLIND. G TUBE FEEDING GLUCERNA @ 
30ML/HR. STOOL SAMPLE COLLECTED DURING SHIFT BUT REJECTED DUE TO INADEQUATE AMOUNT, WILL 
RECOLLECT IF POSSIBLE.  ALL SAFETY MEASURES IN PLACE. BED ALARM ON. BED LOCKED IN THE LOWEST 
POSITION. CALL LIGHT AND TABLE WITHIN EASY REACH, ENDORSED TO PM SHIFT.

## 2022-11-09 VITALS — DIASTOLIC BLOOD PRESSURE: 52 MMHG | SYSTOLIC BLOOD PRESSURE: 118 MMHG

## 2022-11-09 VITALS — SYSTOLIC BLOOD PRESSURE: 143 MMHG | DIASTOLIC BLOOD PRESSURE: 64 MMHG

## 2022-11-09 VITALS — DIASTOLIC BLOOD PRESSURE: 53 MMHG | SYSTOLIC BLOOD PRESSURE: 120 MMHG

## 2022-11-09 LAB
ALBUMIN SERPL BCP-MCNC: 2.7 G/DL (ref 3.4–5)
ALP SERPL-CCNC: 99 U/L (ref 46–116)
ALT SERPL W P-5'-P-CCNC: 49 U/L (ref 12–78)
AST SERPL W P-5'-P-CCNC: 48 U/L (ref 15–37)
BASOPHILS # BLD AUTO: 0.1 K/UL (ref 0–0.2)
BASOPHILS NFR BLD AUTO: 0.6 % (ref 0–2)
BILIRUB SERPL-MCNC: 0.5 MG/DL (ref 0.2–1)
BUN SERPL-MCNC: 14 MG/DL (ref 7–18)
CALCIUM SERPL-MCNC: 9.5 MG/DL (ref 8.5–10.1)
CHLORIDE SERPL-SCNC: 109 MMOL/L (ref 98–107)
CO2 SERPL-SCNC: 24 MMOL/L (ref 21–32)
CREAT SERPL-MCNC: 0.7 MG/DL (ref 0.6–1.3)
EOSINOPHIL NFR BLD AUTO: 1.5 % (ref 0–6)
GLUCOSE SERPL-MCNC: 104 MG/DL (ref 74–106)
HCT VFR BLD AUTO: 40 % (ref 33–45)
HGB BLD-MCNC: 12.8 G/DL (ref 11.5–14.8)
LYMPHOCYTES NFR BLD AUTO: 1.8 K/UL (ref 0.8–4.8)
LYMPHOCYTES NFR BLD AUTO: 19.4 % (ref 20–44)
MCHC RBC AUTO-ENTMCNC: 32 G/DL (ref 31–36)
MCV RBC AUTO: 85 FL (ref 82–100)
MONOCYTES NFR BLD AUTO: 0.9 K/UL (ref 0.1–1.3)
MONOCYTES NFR BLD AUTO: 9.4 % (ref 2–12)
NEUTROPHILS # BLD AUTO: 6.6 K/UL (ref 1.8–8.9)
NEUTROPHILS NFR BLD AUTO: 69.1 % (ref 43–81)
PLATELET # BLD AUTO: 271 K/UL (ref 150–450)
POTASSIUM SERPL-SCNC: 3.2 MMOL/L (ref 3.5–5.1)
PROT SERPL-MCNC: 6.7 G/DL (ref 6.4–8.2)
RBC # BLD AUTO: 4.72 MIL/UL (ref 4–5.2)
SODIUM SERPL-SCNC: 142 MMOL/L (ref 136–145)
WBC NRBC COR # BLD AUTO: 9.5 K/UL (ref 4.3–11)

## 2022-11-09 RX ADMIN — AMLODIPINE BESYLATE SCH MG: 5 TABLET ORAL at 08:20

## 2022-11-09 RX ADMIN — VANCOMYCIN HYDROCHLORIDE SCH MG: 125 CAPSULE ORAL at 18:03

## 2022-11-09 RX ADMIN — DEXTROSE AND SODIUM CHLORIDE SCH MLS/HR: 5; 900 INJECTION, SOLUTION INTRAVENOUS at 17:13

## 2022-11-09 RX ADMIN — INSULIN HUMAN PRN UNITS: 100 INJECTION, SOLUTION PARENTERAL at 06:08

## 2022-11-09 RX ADMIN — INSULIN HUMAN PRN UNITS: 100 INJECTION, SOLUTION PARENTERAL at 11:58

## 2022-11-09 RX ADMIN — VANCOMYCIN HYDROCHLORIDE SCH MG: 125 CAPSULE ORAL at 06:10

## 2022-11-09 RX ADMIN — Medication SCH EACH: at 11:53

## 2022-11-09 RX ADMIN — SERTRALINE HYDROCHLORIDE SCH MG: 25 TABLET, FILM COATED ORAL at 18:03

## 2022-11-09 RX ADMIN — LOSARTAN POTASSIUM SCH MG: 50 TABLET, FILM COATED ORAL at 17:17

## 2022-11-09 RX ADMIN — Medication SCH EACH: at 21:48

## 2022-11-09 RX ADMIN — LOSARTAN POTASSIUM SCH MG: 50 TABLET, FILM COATED ORAL at 08:20

## 2022-11-09 RX ADMIN — TRAMADOL HYDROCHLORIDE SCH MG: 50 TABLET, FILM COATED ORAL at 17:16

## 2022-11-09 RX ADMIN — SODIUM CHLORIDE SCH MG: 9 INJECTION, SOLUTION INTRAVENOUS at 08:19

## 2022-11-09 RX ADMIN — MUPIROCIN CALCIUM SCH APPLIC: 20 CREAM TOPICAL at 17:14

## 2022-11-09 RX ADMIN — INSULIN HUMAN PRN UNITS: 100 INJECTION, SOLUTION PARENTERAL at 17:44

## 2022-11-09 RX ADMIN — TRAMADOL HYDROCHLORIDE SCH MG: 50 TABLET, FILM COATED ORAL at 08:20

## 2022-11-09 RX ADMIN — ATORVASTATIN CALCIUM SCH MG: 40 TABLET, FILM COATED ORAL at 21:48

## 2022-11-09 RX ADMIN — Medication SCH EACH: at 06:07

## 2022-11-09 RX ADMIN — MUPIROCIN CALCIUM SCH APPLIC: 20 CREAM TOPICAL at 08:23

## 2022-11-09 RX ADMIN — Medication SCH EACH: at 17:44

## 2022-11-09 RX ADMIN — VANCOMYCIN HYDROCHLORIDE SCH MG: 125 CAPSULE ORAL at 23:27

## 2022-11-09 RX ADMIN — DEXTROSE AND SODIUM CHLORIDE SCH MLS/HR: 5; 900 INJECTION, SOLUTION INTRAVENOUS at 03:18

## 2022-11-09 RX ADMIN — ALPRAZOLAM PRN MG: 0.25 TABLET ORAL at 20:38

## 2022-11-09 RX ADMIN — ALPRAZOLAM PRN MG: 0.25 TABLET ORAL at 02:06

## 2022-11-09 RX ADMIN — INSULIN HUMAN PRN UNITS: 100 INJECTION, SOLUTION PARENTERAL at 21:55

## 2022-11-09 RX ADMIN — ASPIRIN 81 MG SCH MG: 81 TABLET ORAL at 18:03

## 2022-11-09 RX ADMIN — VANCOMYCIN HYDROCHLORIDE SCH MG: 125 CAPSULE ORAL at 01:02

## 2022-11-09 RX ADMIN — VANCOMYCIN HYDROCHLORIDE SCH MG: 125 CAPSULE ORAL at 12:00

## 2022-11-09 RX ADMIN — ACETAMINOPHEN SCH MG: 160 SOLUTION ORAL at 08:19

## 2022-11-09 NOTE — NUR
MS RN CLOSING NOTE



PT IN BED, SLEEPING AT THIS TIME. A/O X1. AROUSABLE BUT CONFUSED. STABLE ON RA WITH NO S/S 
OF SOB OR DISTRESS. IV ACCESS R HAND #22G RUNNING D5NS @ 75 ML/HR, PATENT AND INTACT. GTUBE 
INTACT AND FLUSHABLE, RUNNING GLUCERNA @ 30 ML/HR. TURNED AND REPOSITIONED REGULARLY. ALL 
CARE PROVIDED AND ADMINISTERED MEDICATIONS TOLERATED WELL. SAFETY PRECAUTIONS MAINTAINED: 
BED LOCKED AND IN LOW POSITION; SIDE RAILS UP X3; BED ALARM ON; CALL LIGHT WITHIN REACH. 
WILL ENDORSE CRYSTAL TO NIGHT SHIFT NURSE.

## 2022-11-09 NOTE — NUR
END OF SHIFT REPORT

Patient in bed, confused. IV in Right hand intact, IVF infusing. On IV abx. Afebrile 
throughout shift. Gtube feeding tolerated well, zero gastric residual. No BM during the 
shift as no stool specimen for test, no diarrhea. Turned and repositioned. Fall/ skin 
precaution maintained. Will endorse to oncoming RN.

## 2022-11-09 NOTE — NUR
MS RN OPENING NOTE



PT IN BED, SLEEPING AT THIS TIME. A/O X1. CONFUSED, AROUSABLE. ON RA WITH NO S/S OF SOB OR 
DISTRESS. IV ACCESS R HAND #22G RUNNING D5NS @ 75 ML/HR, PATENT AND INTACT. GTUBE INTACT AND 
FLUSHABLE, RUNNING GLUCERNA @ 30 ML/HR. SAFETY PRECAUTIONS IN PLACE: BED LOCKED AND IN LOW 
POSITION; SIDE RAILS UP X3; BED ALARM ON; CALL LIGHT WITHIN REACH. WILL CONTINUE TO MONITOR 
AND ASSIST.

## 2022-11-09 NOTE — NUR
ANXIOUS, NOT SLEEPING 

Patient confused unable to educate. Slowly calm down with Ativan in the last 3H. Now 
anxious, no restless in bed, anxious, not sleeping. Give Xanax via GT, will cont to monitor. 
Fall precaution maintained.

## 2022-11-10 VITALS — DIASTOLIC BLOOD PRESSURE: 68 MMHG | SYSTOLIC BLOOD PRESSURE: 144 MMHG

## 2022-11-10 VITALS — DIASTOLIC BLOOD PRESSURE: 62 MMHG | SYSTOLIC BLOOD PRESSURE: 132 MMHG

## 2022-11-10 VITALS — SYSTOLIC BLOOD PRESSURE: 160 MMHG | DIASTOLIC BLOOD PRESSURE: 75 MMHG

## 2022-11-10 LAB
BUN SERPL-MCNC: 11 MG/DL (ref 7–18)
CALCIUM SERPL-MCNC: 9.1 MG/DL (ref 8.5–10.1)
CHLORIDE SERPL-SCNC: 108 MMOL/L (ref 98–107)
CO2 SERPL-SCNC: 25 MMOL/L (ref 21–32)
CREAT SERPL-MCNC: 0.6 MG/DL (ref 0.6–1.3)
GLUCOSE SERPL-MCNC: 121 MG/DL (ref 74–106)
POTASSIUM SERPL-SCNC: 3.4 MMOL/L (ref 3.5–5.1)
SODIUM SERPL-SCNC: 141 MMOL/L (ref 136–145)

## 2022-11-10 RX ADMIN — INSULIN HUMAN PRN UNITS: 100 INJECTION, SOLUTION PARENTERAL at 21:29

## 2022-11-10 RX ADMIN — VANCOMYCIN HYDROCHLORIDE SCH MG: 125 CAPSULE ORAL at 12:04

## 2022-11-10 RX ADMIN — Medication PRN ML: at 05:36

## 2022-11-10 RX ADMIN — DEXTROSE AND SODIUM CHLORIDE SCH MLS/HR: 5; 900 INJECTION, SOLUTION INTRAVENOUS at 12:04

## 2022-11-10 RX ADMIN — TRAMADOL HYDROCHLORIDE SCH MG: 50 TABLET, FILM COATED ORAL at 09:20

## 2022-11-10 RX ADMIN — PANTOPRAZOLE SODIUM SCH MG: 40 GRANULE, DELAYED RELEASE ORAL at 09:18

## 2022-11-10 RX ADMIN — Medication SCH EACH: at 21:21

## 2022-11-10 RX ADMIN — LOSARTAN POTASSIUM SCH MG: 50 TABLET, FILM COATED ORAL at 09:19

## 2022-11-10 RX ADMIN — DEXTROSE AND SODIUM CHLORIDE SCH MLS/HR: 5; 900 INJECTION, SOLUTION INTRAVENOUS at 23:57

## 2022-11-10 RX ADMIN — ACETAMINOPHEN SCH MG: 160 SOLUTION ORAL at 09:18

## 2022-11-10 RX ADMIN — VANCOMYCIN HYDROCHLORIDE SCH MG: 125 CAPSULE ORAL at 17:41

## 2022-11-10 RX ADMIN — Medication SCH EACH: at 09:59

## 2022-11-10 RX ADMIN — VANCOMYCIN HYDROCHLORIDE SCH MG: 125 CAPSULE ORAL at 05:35

## 2022-11-10 RX ADMIN — LORAZEPAM PRN MG: 1 TABLET ORAL at 11:08

## 2022-11-10 RX ADMIN — ATORVASTATIN CALCIUM SCH MG: 40 TABLET, FILM COATED ORAL at 21:21

## 2022-11-10 RX ADMIN — Medication SCH EACH: at 17:41

## 2022-11-10 RX ADMIN — ASPIRIN 81 MG SCH MG: 81 TABLET ORAL at 17:41

## 2022-11-10 RX ADMIN — AMLODIPINE BESYLATE SCH MG: 5 TABLET ORAL at 09:19

## 2022-11-10 RX ADMIN — MUPIROCIN CALCIUM SCH APPLIC: 20 CREAM TOPICAL at 09:21

## 2022-11-10 RX ADMIN — Medication SCH EACH: at 12:21

## 2022-11-10 RX ADMIN — TRAMADOL HYDROCHLORIDE SCH MG: 50 TABLET, FILM COATED ORAL at 17:40

## 2022-11-10 RX ADMIN — MUPIROCIN CALCIUM SCH APPLIC: 20 CREAM TOPICAL at 17:41

## 2022-11-10 RX ADMIN — LOSARTAN POTASSIUM SCH MG: 50 TABLET, FILM COATED ORAL at 17:41

## 2022-11-10 RX ADMIN — VANCOMYCIN HYDROCHLORIDE SCH MG: 125 CAPSULE ORAL at 23:57

## 2022-11-10 RX ADMIN — SERTRALINE HYDROCHLORIDE SCH MG: 25 TABLET, FILM COATED ORAL at 17:40

## 2022-11-10 RX ADMIN — INSULIN HUMAN PRN UNITS: 100 INJECTION, SOLUTION PARENTERAL at 06:03

## 2022-11-10 NOTE — NUR
MS RN CLOSING NOTES

PATIENT IS ALERT AND ORIENTED TIMES 3 AND AWAKE IN BED.NO PAIN NOTED. NO SOB NOTED. NO 
DISTRESS NOTED. ABLE TO MAKE NEEDS KNOWN IN Tuvaluan LANGUAGE. IV ACCESS ON THE R HAND  #22 
INTACT AND FUSING D5NS AT 75 ML/HR. OVERALL SKIN INTACT. GTUBE PLACEMENT CHECKED. IN PLACE. 
NO RESIDUAL NOTED. ON GLUCERNA 1.2 AT 30 ML/HR. TOLERATING WELL.PATIENT IS LEGALLY BLIND. 
ALL DUE MEDS GIVEN AS ORDERED.ALL SAFETY MEASURES IN PLACE. BED ALARM ON. BED LOCKED IN THE 
LOWEST POSITION. CALL LIGHT AND TABLE IN EASY REACH. HEAD OF THE BED ELEVATED FOR ASPIRATION 
PRECAUTION. WILL ENDORSE FOR CRYSTAL..

## 2022-11-10 NOTE — NUR
MS RN CLOSING NOTE



PT IN BED, SLEEPING AT THIS TIME. A/O X1  CONFUSED. STABLE ON RA WITH NO S/S OF SOB OR 
DISTRESS. IV ACCESS R HAND #22G RUNNING D5NS @ 75 ML/HR, PATENT AND INTACT. GTUBE INTACT AND 
FLUSHABLE, RUNNING GLUCERNA @ 30 ML/HR. TURNED AND REPOSITIONED REGULARLY. ALL CARE PROVIDED 
AND ADMINISTERED MEDICATIONS TOLERATED WELL. SAFETY PRECAUTIONS MAINTAINED: BED LOCKED AND 
IN LOW POSITION; SIDE RAILS UP X3; BED ALARM ON; CALL LIGHT WITHIN REACH. WILL ENDORSE CRYSTAL 
ON COMING SHIFT NURSE.

## 2022-11-10 NOTE — NUR
MS RN OPENING NOTES

PATIENT IS ALERT AND ORIENTED TIMES 3 AND AWAKE IN BED.NO PAIN NOTED. NO SOB NOTED. NO 
DISTRESS NOTED. ABLE TO MAKE NEEDS KNOWN IN [place] LANGUAGE. IV ACCESS ON THE R HAND  #22 
INTACT AND FUSING D5NS AT 75 ML/HR. OVERALL SKIN INTACT. GTUBE PLACEMENT CHECKED. IN PLACE. 
NO RESIDUAL NOTED. ON GLUCERNA 1.2 AT 30 ML/HR. TOLERATING WELL.PATIENT IS LEGALLY BLIND. 
ALL SAFETY MEASURES IN PLACE. BED ALARM ON. BED LOCKED IN THE LOWEST POSITION. CALL LIGHT 
AND TABLE IN EASY REACH. HEAD OF THE BED ELEVATED FOR ASPIRATION PRECAUTION. WILL CONTINUE 
TO MONITOR CLOSELY.

## 2022-11-10 NOTE — NUR
MS RN OPENING NOTES



PATIENT IS AWAKE A/O X2- 3.NO PAIN NOTED. NO SOB NOTED. NO DISTRESS NOTED. ABLE TO MAKE 
NEEDS KNOWN IN [place] LANGUAGE. IV ACCESS ON THE R HAND #22 INTACT AND FUSING D5NS AT 75 
ML/HR. OVERALL SKIN INTACT. GTUBE PLACEMENT CHECKED. IN PLACE. NO RESIDUAL NOTED. ON 
GLUCERNA 1.2 AT 30 ML/HR. TOLERATING WELL.PATIENT IS LEGALLY BLIND. ALL SAFETY MEASURES IN 
PLACE. BED ALARM ON. BED LOCKED IN THE LOWEST POSITION. CALL LIGHT AND TABLE IN EASY REACH. 
HEAD OF THE BED ELEVATED FOR ASPIRATION PRECAUTION.

## 2022-11-11 RX ADMIN — INSULIN HUMAN PRN UNITS: 100 INJECTION, SOLUTION PARENTERAL at 06:07

## 2022-11-11 RX ADMIN — TRAMADOL HYDROCHLORIDE SCH MG: 50 TABLET, FILM COATED ORAL at 09:00

## 2022-11-11 RX ADMIN — MUPIROCIN CALCIUM SCH APPLIC: 20 CREAM TOPICAL at 17:53

## 2022-11-11 RX ADMIN — LOSARTAN POTASSIUM SCH MG: 50 TABLET, FILM COATED ORAL at 09:00

## 2022-11-11 RX ADMIN — Medication PRN ML: at 14:34

## 2022-11-11 RX ADMIN — ACETAMINOPHEN SCH MG: 160 SOLUTION ORAL at 08:59

## 2022-11-11 RX ADMIN — Medication SCH EACH: at 21:49

## 2022-11-11 RX ADMIN — VANCOMYCIN HYDROCHLORIDE SCH MG: 125 CAPSULE ORAL at 23:37

## 2022-11-11 RX ADMIN — SERTRALINE HYDROCHLORIDE SCH MG: 25 TABLET, FILM COATED ORAL at 17:52

## 2022-11-11 RX ADMIN — ATORVASTATIN CALCIUM SCH MG: 40 TABLET, FILM COATED ORAL at 21:44

## 2022-11-11 RX ADMIN — MUPIROCIN CALCIUM SCH APPLIC: 20 CREAM TOPICAL at 09:00

## 2022-11-11 RX ADMIN — Medication SCH EACH: at 12:47

## 2022-11-11 RX ADMIN — ASPIRIN 81 MG SCH MG: 81 TABLET ORAL at 17:52

## 2022-11-11 RX ADMIN — DIVALPROEX SODIUM SCH MG: 125 CAPSULE ORAL at 16:33

## 2022-11-11 RX ADMIN — Medication SCH EACH: at 08:51

## 2022-11-11 RX ADMIN — VANCOMYCIN HYDROCHLORIDE SCH MG: 125 CAPSULE ORAL at 13:02

## 2022-11-11 RX ADMIN — Medication SCH EACH: at 17:28

## 2022-11-11 RX ADMIN — TRAMADOL HYDROCHLORIDE SCH MG: 50 TABLET, FILM COATED ORAL at 16:34

## 2022-11-11 RX ADMIN — PANTOPRAZOLE SODIUM SCH MG: 40 GRANULE, DELAYED RELEASE ORAL at 08:58

## 2022-11-11 RX ADMIN — VANCOMYCIN HYDROCHLORIDE SCH MG: 125 CAPSULE ORAL at 05:20

## 2022-11-11 RX ADMIN — LOSARTAN POTASSIUM SCH MG: 50 TABLET, FILM COATED ORAL at 16:34

## 2022-11-11 RX ADMIN — DEXTROSE AND SODIUM CHLORIDE SCH MLS/HR: 5; 900 INJECTION, SOLUTION INTRAVENOUS at 14:29

## 2022-11-11 RX ADMIN — Medication PRN ML: at 06:08

## 2022-11-11 RX ADMIN — DIVALPROEX SODIUM SCH MG: 125 CAPSULE ORAL at 08:59

## 2022-11-11 RX ADMIN — VANCOMYCIN HYDROCHLORIDE SCH MG: 125 CAPSULE ORAL at 17:52

## 2022-11-11 RX ADMIN — MUPIROCIN SCH GM: 20 OINTMENT TOPICAL at 13:30

## 2022-11-11 RX ADMIN — AMLODIPINE BESYLATE SCH MG: 5 TABLET ORAL at 08:59

## 2022-11-11 RX ADMIN — LORAZEPAM PRN MG: 0.5 TABLET ORAL at 15:43

## 2022-11-11 NOTE — NUR
MS RN OPENING NOTES

RECEIVED PATIENT IN BED.PATIENT IS ALERT AND ORIENTED TIMES 3 AND AWAKE IN BED.NO PAIN 
NOTED. NO SOB NOTED. NO DISTRESS NOTED. ABLE TO MAKE NEEDS KNOWN IN Qatari LANGUAGE. IV 
ACCESS ON THE R HAND  #22 INTACT AND FUSING D5NS AT 75 ML/HR. OVERALL SKIN INTACT. GTUBE 
PLACEMENT CHECKED. IN PLACE. NO RESIDUAL NOTED. ON GLUCERNA 1.2 AT 30 ML/HR. TOLERATING 
WELL.PATIENT IS LEGALLY BLIND. ALL SAFETY MEASURES IN PLACE. BED ALARM ON. BED LOCKED IN THE 
LOWEST POSITION. CALL LIGHT AND TABLE IN EASY REACH. HEAD OF THE BED ELEVATED FOR ASPIRATION 
PRECAUTION. WILL CONTINUE TO MONITOR CLOSELY.

## 2022-11-11 NOTE — NUR
MS RN OPENING NOTE;



PATIENT RECEIVED IN BED AAOX2,ON RM AIR CARLA WELL NO SIGN SOB/DISTRESS NOTED,BREATHING EVEN 
AND UNLABORED.NO COMPLAIN FOR PAIN/DISCOMFORT AT THIS TIME,IV ACCESS ON THE LEFT HAND G 22, 
ON SALINE LOCK, PATENT AND INTACT. SAFETY MEASURES ENSURED WITH BED ON LOWEST LOCKED 
POSITION. SIDERAILS RAISED, AND CALL LIGHT WITHIN REACH,WILL CONTINUE TO MONITOR.

## 2022-11-11 NOTE — NUR
MS RN CLOSING NOTES



PATIENT IS AWAKE A/O X2- 3.NO PAIN NOTED. NO SOB NOTED. NO DISTRESS NOTED. ABLE TO MAKE 
NEEDS KNOWN IN Vietnamese LANGUAGE AND ENGLISH. IV ACCESS ON THE R HAND #22 INTACT AND FUSING 
D5NS AT 75 ML/HR. OVERALL SKIN INTACT. GTUBE PLACEMENT CHECKED. IN PLACE. NO RESIDUAL NOTED. 
ON GLUCERNA 1.2 AT 30 ML/HR. TOLERATING WELL.PATIENT IS LEGALLY BLIND. ALL SAFETY MEASURES 
IN PLACE. BED ALARM ON. BED LOCKED IN THE LOWEST POSITION. CALL LIGHT AND TABLE IN EASY 
REACH. HEAD OF THE BED ELEVATED FOR ASPIRATION PRECAUTION.  WILL ENDORSE CARE TO DAY SHIFT 
NURSE.

## 2022-11-11 NOTE — NUR
MS RN CLOSING NOTES

 PATIENT AWAKE IN BED.PATIENT IS ALERT AND ORIENTED TIMES 3 AND AWAKE IN BED.NO PAIN NOTED. 
NO SOB NOTED. NO DISTRESS NOTED. ABLE TO MAKE NEEDS KNOWN IN Korean LANGUAGE.PATIENT  
REMOVED IV ACCESS AT 1845 . TRIED 2 TIMES UNSUCCESSFUL. WILL ENDORSE NIGHT SHIFT TO TRY. R 
SECOND TOE WOUND NOTED . PODIATRIST CHECKED . NEW ORDER GIVEN. GTUBE PLACEMENT CHECKED. IN 
PLACE. NO RESIDUAL NOTED. ON GLUCERNA 1.2 AT 30 ML/HR. TOLERATING WELL.PATIENT IS LEGALLY 
BLIND. ALL DUE MEDS GIVEN AS ORDERED.ALL SAFETY MEASURES IN PLACE. BED ALARM ON. BED LOCKED 
IN THE LOWEST POSITION. CALL LIGHT AND TABLE IN EASY REACH. HEAD OF THE BED ELEVATED FOR 
ASPIRATION PRECAUTION. WILL ENDORSE FOR CRYSTAL.

## 2022-11-12 VITALS — DIASTOLIC BLOOD PRESSURE: 62 MMHG | SYSTOLIC BLOOD PRESSURE: 116 MMHG

## 2022-11-12 VITALS — DIASTOLIC BLOOD PRESSURE: 75 MMHG | SYSTOLIC BLOOD PRESSURE: 151 MMHG

## 2022-11-12 RX ADMIN — DEXTROSE AND SODIUM CHLORIDE SCH MLS/HR: 5; 900 INJECTION, SOLUTION INTRAVENOUS at 16:48

## 2022-11-12 RX ADMIN — VANCOMYCIN HYDROCHLORIDE SCH MG: 125 CAPSULE ORAL at 11:53

## 2022-11-12 RX ADMIN — DIVALPROEX SODIUM SCH MG: 125 CAPSULE ORAL at 08:44

## 2022-11-12 RX ADMIN — ASPIRIN 81 MG SCH MG: 81 TABLET ORAL at 17:38

## 2022-11-12 RX ADMIN — VANCOMYCIN HYDROCHLORIDE SCH MG: 125 CAPSULE ORAL at 17:36

## 2022-11-12 RX ADMIN — Medication SCH EACH: at 11:55

## 2022-11-12 RX ADMIN — AMLODIPINE BESYLATE SCH MG: 5 TABLET ORAL at 08:44

## 2022-11-12 RX ADMIN — Medication SCH EACH: at 17:36

## 2022-11-12 RX ADMIN — ATORVASTATIN CALCIUM SCH MG: 40 TABLET, FILM COATED ORAL at 21:55

## 2022-11-12 RX ADMIN — MUPIROCIN SCH GM: 20 OINTMENT TOPICAL at 12:25

## 2022-11-12 RX ADMIN — DIVALPROEX SODIUM SCH MG: 125 CAPSULE ORAL at 17:01

## 2022-11-12 RX ADMIN — Medication SCH EACH: at 22:33

## 2022-11-12 RX ADMIN — LOSARTAN POTASSIUM SCH MG: 50 TABLET, FILM COATED ORAL at 16:22

## 2022-11-12 RX ADMIN — Medication SCH EACH: at 06:29

## 2022-11-12 RX ADMIN — SERTRALINE HYDROCHLORIDE SCH MG: 25 TABLET, FILM COATED ORAL at 17:38

## 2022-11-12 RX ADMIN — PANTOPRAZOLE SODIUM SCH MG: 40 GRANULE, DELAYED RELEASE ORAL at 08:44

## 2022-11-12 RX ADMIN — LOSARTAN POTASSIUM SCH MG: 50 TABLET, FILM COATED ORAL at 08:44

## 2022-11-12 RX ADMIN — DEXTROSE AND SODIUM CHLORIDE SCH MLS/HR: 5; 900 INJECTION, SOLUTION INTRAVENOUS at 04:09

## 2022-11-12 RX ADMIN — MUPIROCIN SCH GM: 20 OINTMENT TOPICAL at 01:18

## 2022-11-12 RX ADMIN — VANCOMYCIN HYDROCHLORIDE SCH MG: 125 CAPSULE ORAL at 05:06

## 2022-11-12 RX ADMIN — TRAMADOL HYDROCHLORIDE SCH MG: 50 TABLET, FILM COATED ORAL at 17:02

## 2022-11-12 RX ADMIN — MUPIROCIN CALCIUM SCH APPLIC: 20 CREAM TOPICAL at 08:45

## 2022-11-12 RX ADMIN — MUPIROCIN CALCIUM SCH APPLIC: 20 CREAM TOPICAL at 17:02

## 2022-11-12 RX ADMIN — ACETAMINOPHEN SCH MG: 160 SOLUTION ORAL at 08:43

## 2022-11-12 RX ADMIN — TRAMADOL HYDROCHLORIDE SCH MG: 50 TABLET, FILM COATED ORAL at 08:44

## 2022-11-12 NOTE — NUR
MS RN OPENING NOTES

RECEIVED PATIENT IN BED. ALERT AND ORIENTED , A/O X 1. PATIENT IS ON RA, TOLERATES WELL. NO 
S/S OF SOB OR DISTRESS. NO S/S OF HAVING PAIN. IV ACCESS IS AT RIGHT  FA,  #22G, 
INFILTRATED. REMOVED THE NEEDLE, AND REINSERTED THE IV AT THE  LEFT FA, #22G. SAFETY MEASURE 
IN PLACE: BED IN LOWEST POSITION, LOCKED; BED ALARM ACTIVATED, CALL BELL IN REACH, BED SIDE 
TABLE AND WATER ARE IN REACH.WILL MONITOR THE  PATIENT'S CONDITION, AND PROVIDE PATIENT 
CARE.

## 2022-11-12 NOTE — NUR
MS RN CLOSING NOTE;



PATIENT IN BED AAOX2,ON RM AIR CARLA WELL NO SIGN SOB/DISTRESS NOTED,BREATHING EVEN AND 
UNLABORED.NO COMPLAIN FOR PAIN/DISCOMFORT DURING SHIFT,DUE MEDS GIVEN AS ORDER,ALL NEEDS 
ATTENDED,IV ACCESS ON THE LEFT HAND G 22, ON SALINE LOCK, PATENT AND INTACT. SAFETY MEASURES 
ENSURED WITH BED ON LOWEST LOCKED POSITION. SIDERAILS RAISED, AND CALL LIGHT WITHIN 
REACH,WILL ENDORSED TO NEXT SHIFT.

## 2022-11-12 NOTE — NUR
MS RN CLOSING NOTES



PATIENT LAYING IN BED, A/O X 2, TOLERATING WELL ON ROOM AIR WITH NO S/S RESPIRATORY 
DISTRESS. NO COMPLAINTS OF PAIN OR DISCOMFORT. L HAND # 22 SL CLEAN, INTACT, AND FLUSHING 
WELL. SAFETY MEASURES IN PLACE: BED IN LOWEST LOCKED POSITION, SIDE RAILS UP X 2, CALL LIGHT 
WITHIN REACH. G-TUBE IN PLACE WITH GLUCERNA 1.2 INFUSING @ 45 ML/HR. ALL NEEDS MET. WILL 
ENDORSE TO NIGHT SHIFT FOR CRYSTAL.

## 2022-11-12 NOTE — NUR
MS RN OPENING NOTES



PATIENT LAYING IN BED, A/O X 2, TOLERATING WELL ON ROOM AIR WITH NO S/S RESPIRATORY 
DISTRESS. NO COMPLAINTS OF PAIN OR DISCOMFORT. L HAND # 22 SL CLEAN, INTACT, AND FLUSHING 
WELL. SAFETY MEASURES IN PLACE: BED IN LOWEST LOCKED POSITION, SIDE RAILS UP X 2, CALL LIGHT 
WITHIN REACH. G-TUBE IN PLACE WITH FEEDING INFUSING @ 45 ML/HR. WILL CONTINUE TO MONITOR.

## 2022-11-12 NOTE — NUR
PATIENT'S BS 95 MG/DL, NO COVERAGE GIVEN. PATIENT IS ON CONTINUOUS TF, WILL MONITOR FOR 
HYPO/HYPERGLYCEMIA

## 2022-11-13 VITALS — SYSTOLIC BLOOD PRESSURE: 156 MMHG | DIASTOLIC BLOOD PRESSURE: 70 MMHG

## 2022-11-13 VITALS — DIASTOLIC BLOOD PRESSURE: 74 MMHG | SYSTOLIC BLOOD PRESSURE: 143 MMHG

## 2022-11-13 VITALS — DIASTOLIC BLOOD PRESSURE: 58 MMHG | SYSTOLIC BLOOD PRESSURE: 147 MMHG

## 2022-11-13 LAB
ALBUMIN SERPL BCP-MCNC: 2.6 G/DL (ref 3.4–5)
ALP SERPL-CCNC: 100 U/L (ref 46–116)
ALT SERPL W P-5'-P-CCNC: 28 U/L (ref 12–78)
AST SERPL W P-5'-P-CCNC: 26 U/L (ref 15–37)
BASOPHILS # BLD AUTO: 0 K/UL (ref 0–0.2)
BASOPHILS NFR BLD AUTO: 0.7 % (ref 0–2)
BILIRUB SERPL-MCNC: 0.5 MG/DL (ref 0.2–1)
BUN SERPL-MCNC: 12 MG/DL (ref 7–18)
CALCIUM SERPL-MCNC: 9.6 MG/DL (ref 8.5–10.1)
CHLORIDE SERPL-SCNC: 108 MMOL/L (ref 98–107)
CO2 SERPL-SCNC: 27 MMOL/L (ref 21–32)
CREAT SERPL-MCNC: 0.7 MG/DL (ref 0.6–1.3)
EOSINOPHIL NFR BLD AUTO: 3.1 % (ref 0–6)
GLUCOSE SERPL-MCNC: 113 MG/DL (ref 74–106)
HCT VFR BLD AUTO: 39 % (ref 33–45)
HGB BLD-MCNC: 12.8 G/DL (ref 11.5–14.8)
LYMPHOCYTES NFR BLD AUTO: 1.7 K/UL (ref 0.8–4.8)
LYMPHOCYTES NFR BLD AUTO: 22.3 % (ref 20–44)
MAGNESIUM SERPL-MCNC: 1.8 MG/DL (ref 1.8–2.4)
MCHC RBC AUTO-ENTMCNC: 33 G/DL (ref 31–36)
MCV RBC AUTO: 82 FL (ref 82–100)
MONOCYTES NFR BLD AUTO: 0.8 K/UL (ref 0.1–1.3)
MONOCYTES NFR BLD AUTO: 10.5 % (ref 2–12)
NEUTROPHILS # BLD AUTO: 4.7 K/UL (ref 1.8–8.9)
NEUTROPHILS NFR BLD AUTO: 63.4 % (ref 43–81)
PHOSPHATE SERPL-MCNC: 3.2 MG/DL (ref 2.5–4.9)
PLATELET # BLD AUTO: 294 K/UL (ref 150–450)
POTASSIUM SERPL-SCNC: 3.2 MMOL/L (ref 3.5–5.1)
PROT SERPL-MCNC: 6.7 G/DL (ref 6.4–8.2)
RBC # BLD AUTO: 4.7 MIL/UL (ref 4–5.2)
SODIUM SERPL-SCNC: 144 MMOL/L (ref 136–145)
WBC NRBC COR # BLD AUTO: 7.5 K/UL (ref 4.3–11)

## 2022-11-13 RX ADMIN — MUPIROCIN SCH GM: 20 OINTMENT TOPICAL at 01:00

## 2022-11-13 RX ADMIN — VANCOMYCIN HYDROCHLORIDE SCH MG: 125 CAPSULE ORAL at 11:12

## 2022-11-13 RX ADMIN — TRAMADOL HYDROCHLORIDE SCH MG: 50 TABLET, FILM COATED ORAL at 16:25

## 2022-11-13 RX ADMIN — ATORVASTATIN CALCIUM SCH MG: 40 TABLET, FILM COATED ORAL at 22:20

## 2022-11-13 RX ADMIN — LOSARTAN POTASSIUM SCH MG: 50 TABLET, FILM COATED ORAL at 16:25

## 2022-11-13 RX ADMIN — VANCOMYCIN HYDROCHLORIDE SCH MG: 125 CAPSULE ORAL at 05:19

## 2022-11-13 RX ADMIN — DIVALPROEX SODIUM SCH MG: 125 CAPSULE ORAL at 16:25

## 2022-11-13 RX ADMIN — VANCOMYCIN HYDROCHLORIDE SCH MG: 125 CAPSULE ORAL at 01:01

## 2022-11-13 RX ADMIN — Medication SCH EACH: at 22:20

## 2022-11-13 RX ADMIN — MUPIROCIN SCH GM: 20 OINTMENT TOPICAL at 12:05

## 2022-11-13 RX ADMIN — LOSARTAN POTASSIUM SCH MG: 50 TABLET, FILM COATED ORAL at 08:54

## 2022-11-13 RX ADMIN — AMLODIPINE BESYLATE SCH MG: 5 TABLET ORAL at 08:54

## 2022-11-13 RX ADMIN — Medication SCH EACH: at 06:59

## 2022-11-13 RX ADMIN — DEXTROSE AND SODIUM CHLORIDE SCH MLS/HR: 5; 900 INJECTION, SOLUTION INTRAVENOUS at 20:28

## 2022-11-13 RX ADMIN — ALPRAZOLAM PRN MG: 0.25 TABLET ORAL at 22:29

## 2022-11-13 RX ADMIN — SERTRALINE HYDROCHLORIDE SCH MG: 25 TABLET, FILM COATED ORAL at 17:45

## 2022-11-13 RX ADMIN — PANTOPRAZOLE SODIUM SCH MG: 40 GRANULE, DELAYED RELEASE ORAL at 08:54

## 2022-11-13 RX ADMIN — MUPIROCIN CALCIUM SCH APPLIC: 20 CREAM TOPICAL at 08:55

## 2022-11-13 RX ADMIN — ASPIRIN 81 MG SCH MG: 81 TABLET ORAL at 17:45

## 2022-11-13 RX ADMIN — Medication SCH EACH: at 11:12

## 2022-11-13 RX ADMIN — ACETAMINOPHEN SCH MG: 160 SOLUTION ORAL at 08:55

## 2022-11-13 RX ADMIN — DEXTROSE AND SODIUM CHLORIDE SCH MLS/HR: 5; 900 INJECTION, SOLUTION INTRAVENOUS at 05:14

## 2022-11-13 RX ADMIN — INSULIN HUMAN PRN UNITS: 100 INJECTION, SOLUTION PARENTERAL at 11:25

## 2022-11-13 RX ADMIN — VANCOMYCIN HYDROCHLORIDE SCH MG: 125 CAPSULE ORAL at 17:45

## 2022-11-13 RX ADMIN — Medication PRN ML: at 05:20

## 2022-11-13 RX ADMIN — DIVALPROEX SODIUM SCH MG: 125 CAPSULE ORAL at 08:54

## 2022-11-13 RX ADMIN — Medication SCH EACH: at 17:46

## 2022-11-13 RX ADMIN — TRAMADOL HYDROCHLORIDE SCH MG: 50 TABLET, FILM COATED ORAL at 08:54

## 2022-11-13 RX ADMIN — VANCOMYCIN HYDROCHLORIDE SCH MG: 125 CAPSULE ORAL at 23:35

## 2022-11-13 RX ADMIN — MUPIROCIN CALCIUM SCH APPLIC: 20 CREAM TOPICAL at 16:26

## 2022-11-13 NOTE — NUR
MS RN CLOSING NOTES



PATIENT LAYING IN BED, A/O X 2, TOLERATING WELL ON ROOM AIR WITH NO S/S RESPIRATORY 
DISTRESS. NO COMPLAINTS OF PAIN OR DISCOMFORT AT THIS TIME. NO IV ACCESS AT THIS TIME, WILL 
ENDORSE TO NIGHT SHIFT. SAFETY MEASURES IN PLACE: BED IN LOWEST LOCKED POSITION, SIDE RAILS 
UP X 2, CALL LIGHT WITHIN REACH. ALL NEEDS MET. PATIENT TURNED Q2H DURING SHIFT. WILL 
ENDORSE TO NIGHT SHIFT FOR CRYSTAL.

## 2022-11-13 NOTE — NUR
RN NOTE



TUBE FEEDING INCREASED TO 45 ML/HR, TOLERATED WELL AFTER ONE HOUR OF INCREASING TF. NO 
RESIDUAL.

## 2022-11-13 NOTE — NUR
RN OPENING NOTE



PATIENT IN BED, AWAKE. PATIENT IS A/O X 1 AT THIS TIME, CONFUSED SAYING "TAKE ME TO MY 
LIVING ROOM", PATIENT REDIRECTABLE. PER RY RN, PATIENT PULLED OUT HER IV ACCESS. WILL 
INSERT NEW IV ACCESS. PATIENT DOES NOT REPORT ANY PAIN AT THIS TIME. PATIENT HAS A GT WITH 
ONGOING GLUCERNA 1.2 45 ML/HR, TOLERATING WELL. NO RESIDUAL. GT PATENT AND INTACT, IN 
APPROPRIATE PLACE. PATIENT NOT IN ANY APPARENT DISTRESS. SAFETY MEASURES IN PLACE: BED 
LOCKED AND IN LOWEST POSITION, CALL LIGHT WITHIN REACH, SIDE RAILS UP. WILL MONITOR PATIENT 
CLOSELY.

## 2022-11-13 NOTE — NUR
RN NOTE



INCREASED RATE OF TUBE FEEDING TO 40 ML/HR, WILL RECHECK RESIDUAL AND PATIENT'S TOLERANCE TO 
INCREASED TF RATE AT A LATER TIME.

## 2022-11-13 NOTE — NUR
MS RN OPENING NOTES



PATIENT LAYING IN BED, A/O X 2, TOLERATING WELL ON ROOM AIR WITH NO S/S RESPIRATORY 
DISTRESS. NO COMPLAINTS OF PAIN OR DISCOMFORT AT THIS TIME. L FA # 22 G IV WITH D5 NS @ 75 
ML/HR. SAFETY MEASURES IN PLACE: BED IN LOWEST LOCKED POSITION, SIDE RAILS UP X 2, CALL 
LIGHT WITHIN REACH. WILL CONTINUE TO MONITOR.

## 2022-11-14 VITALS — DIASTOLIC BLOOD PRESSURE: 64 MMHG | SYSTOLIC BLOOD PRESSURE: 126 MMHG

## 2022-11-14 VITALS — DIASTOLIC BLOOD PRESSURE: 55 MMHG | SYSTOLIC BLOOD PRESSURE: 162 MMHG

## 2022-11-14 VITALS — SYSTOLIC BLOOD PRESSURE: 122 MMHG | DIASTOLIC BLOOD PRESSURE: 60 MMHG

## 2022-11-14 LAB
BASOPHILS # BLD AUTO: 0.1 K/UL (ref 0–0.2)
BASOPHILS NFR BLD AUTO: 0.7 % (ref 0–2)
BUN SERPL-MCNC: 12 MG/DL (ref 7–18)
CALCIUM SERPL-MCNC: 9.8 MG/DL (ref 8.5–10.1)
CHLORIDE SERPL-SCNC: 109 MMOL/L (ref 98–107)
CO2 SERPL-SCNC: 28 MMOL/L (ref 21–32)
CREAT SERPL-MCNC: 0.6 MG/DL (ref 0.6–1.3)
EOSINOPHIL NFR BLD AUTO: 2.1 % (ref 0–6)
GLUCOSE SERPL-MCNC: 92 MG/DL (ref 74–106)
HCT VFR BLD AUTO: 40 % (ref 33–45)
HGB BLD-MCNC: 13.2 G/DL (ref 11.5–14.8)
LYMPHOCYTES NFR BLD AUTO: 1.3 K/UL (ref 0.8–4.8)
LYMPHOCYTES NFR BLD AUTO: 14 % (ref 20–44)
MAGNESIUM SERPL-MCNC: 1.7 MG/DL (ref 1.8–2.4)
MCHC RBC AUTO-ENTMCNC: 33 G/DL (ref 31–36)
MCV RBC AUTO: 83 FL (ref 82–100)
MONOCYTES NFR BLD AUTO: 0.7 K/UL (ref 0.1–1.3)
MONOCYTES NFR BLD AUTO: 7.9 % (ref 2–12)
NEUTROPHILS # BLD AUTO: 6.8 K/UL (ref 1.8–8.9)
NEUTROPHILS NFR BLD AUTO: 75.3 % (ref 43–81)
PHOSPHATE SERPL-MCNC: 2.6 MG/DL (ref 2.5–4.9)
PLATELET # BLD AUTO: 302 K/UL (ref 150–450)
POTASSIUM SERPL-SCNC: 2.7 MMOL/L (ref 3.5–5.1)
RBC # BLD AUTO: 4.83 MIL/UL (ref 4–5.2)
SODIUM SERPL-SCNC: 144 MMOL/L (ref 136–145)
WBC NRBC COR # BLD AUTO: 9 K/UL (ref 4.3–11)

## 2022-11-14 RX ADMIN — VANCOMYCIN HYDROCHLORIDE SCH MG: 125 CAPSULE ORAL at 17:20

## 2022-11-14 RX ADMIN — QUETIAPINE SCH MG: 25 TABLET, FILM COATED ORAL at 21:24

## 2022-11-14 RX ADMIN — AMLODIPINE BESYLATE SCH MG: 5 TABLET ORAL at 09:19

## 2022-11-14 RX ADMIN — PANTOPRAZOLE SODIUM SCH MG: 40 GRANULE, DELAYED RELEASE ORAL at 09:18

## 2022-11-14 RX ADMIN — Medication SCH EACH: at 06:34

## 2022-11-14 RX ADMIN — MUPIROCIN SCH GM: 20 OINTMENT TOPICAL at 12:05

## 2022-11-14 RX ADMIN — LOSARTAN POTASSIUM SCH MG: 50 TABLET, FILM COATED ORAL at 17:17

## 2022-11-14 RX ADMIN — MUPIROCIN SCH GM: 20 OINTMENT TOPICAL at 01:05

## 2022-11-14 RX ADMIN — DEXTROSE AND SODIUM CHLORIDE SCH MLS/HR: 5; 900 INJECTION, SOLUTION INTRAVENOUS at 21:27

## 2022-11-14 RX ADMIN — VANCOMYCIN HYDROCHLORIDE SCH MG: 125 CAPSULE ORAL at 11:59

## 2022-11-14 RX ADMIN — INSULIN HUMAN PRN UNITS: 100 INJECTION, SOLUTION PARENTERAL at 21:38

## 2022-11-14 RX ADMIN — Medication SCH EACH: at 21:38

## 2022-11-14 RX ADMIN — QUETIAPINE SCH MG: 25 TABLET, FILM COATED ORAL at 12:09

## 2022-11-14 RX ADMIN — INSULIN HUMAN PRN UNITS: 100 INJECTION, SOLUTION PARENTERAL at 12:07

## 2022-11-14 RX ADMIN — Medication PRN ML: at 06:56

## 2022-11-14 RX ADMIN — ATORVASTATIN CALCIUM SCH MG: 40 TABLET, FILM COATED ORAL at 21:24

## 2022-11-14 RX ADMIN — TRAMADOL HYDROCHLORIDE SCH MG: 50 TABLET, FILM COATED ORAL at 17:18

## 2022-11-14 RX ADMIN — DEXTROSE AND SODIUM CHLORIDE SCH MLS/HR: 5; 900 INJECTION, SOLUTION INTRAVENOUS at 09:47

## 2022-11-14 RX ADMIN — TRAMADOL HYDROCHLORIDE SCH MG: 50 TABLET, FILM COATED ORAL at 09:26

## 2022-11-14 RX ADMIN — VANCOMYCIN HYDROCHLORIDE SCH MG: 125 CAPSULE ORAL at 06:33

## 2022-11-14 RX ADMIN — ASPIRIN 81 MG SCH MG: 81 TABLET ORAL at 17:19

## 2022-11-14 RX ADMIN — DIVALPROEX SODIUM SCH MG: 125 CAPSULE ORAL at 09:20

## 2022-11-14 RX ADMIN — Medication SCH OZ: at 11:09

## 2022-11-14 RX ADMIN — Medication SCH EACH: at 12:05

## 2022-11-14 RX ADMIN — ACETAMINOPHEN SCH MG: 160 SOLUTION ORAL at 09:20

## 2022-11-14 RX ADMIN — LOSARTAN POTASSIUM SCH MG: 50 TABLET, FILM COATED ORAL at 09:20

## 2022-11-14 RX ADMIN — Medication SCH EACH: at 16:47

## 2022-11-14 RX ADMIN — DIVALPROEX SODIUM SCH MG: 125 CAPSULE ORAL at 17:17

## 2022-11-14 NOTE — NUR
WOUND CARE CONSULT: AREAS OF REDNESS NOTED TO FEET, PRESENT ON ADMISSION. DEFER TO 
PODIATRIST ON CASE. RECOMMENDATIONS MADE FOR SKIN PROTECTION DISCUSSED WITH NURSING STAFF. 
MD IN AGREEMENT WITH PLAN OF CARE.

## 2022-11-14 NOTE — NUR
RN CLOSING NOTE



RECEIVED PATIENT IN BED, AWAKE. PATIENT IS A/O X 1, PATIENT IS BLIND, FOLLOWS COMMANDS, 
STILL ON SOFT RESTRAINT ON THE RIGHT HAND D/T HER PULLING HER IV LINES AND TUBES. WITH LFA 
22G WITH D5NS @75 ML/HR PATENT, FLUSHING WELL.  PATIENT DOES NOT REPORT ANY PAIN AT THIS 
TIME. WITH GT WITH ONGOING GLUCERNA 1.2 45 ML/HR, TOLERATING WELL. NO RESIDUAL. GT PATENT 
AND INTACT, IN APPROPRIATE PLACE. PATIENT NOT IN ANY APPARENT DISTRESS. ALL DUE MEDS GIVEN, 
PATIENT KEPT SAFE, ALL NEEDS MET. SAFETY MEASURES MAINTAINED: BED LOCKED AND IN LOWEST 
POSITION, CALL LIGHT WITHIN REACH, SIDE RAILS UP. ENDORSED TO NIGHT SHIFT NURSE.

## 2022-11-14 NOTE — NUR
RN NOTES 



SEROQUEL 12.5 MG PLACED ON HOLD AS THE DOSE WAS GIVEN EARLY PER DR SMALL'S ORDERS AT 11AM

## 2022-11-14 NOTE — NUR
RN OPENING NOTE



PATIENT IN BED, AWAKE. PATIENT IS A/O X 1 AT THIS TIME, CONFUSED. INFORMED YURIDIA DREW NP 
REGARDING PATIENT BEING RESTLESS DESPITE BEING ADMINISTERED  XANAX , PULLING ON HER GTUBE, 
DISROBING, AND PULLING OUT IV ACCESS. OBTAINED ORDER FOR R SOFT WRIST RESTRAINS. PATIENT 
DOES NOT REPORT ANY PAIN AT THIS TIME. PATIENT HAS A GT WITH ONGOING GLUCERNA 1.2 45 ML/HR, 
TOLERATING WELL. NO RESIDUAL. GT PATENT AND INTACT, IN APPROPRIATE PLACE. PATIENT NOT IN ANY 
APPARENT DISTRESS. SAFETY MEASURES IN PLACE: BED LOCKED AND IN LOWEST POSITION, CALL LIGHT 
WITHIN REACH, SIDE RAILS UP. ALL NEEDS MET AND ATTENDED. ALL ORDERS CARRIED OUT. ENDORSED 
PATIENT TO DAY SHIFT RN FOR CRYSTAL. 

-------------------------------------------------------------------------------

Addendum: 11/14/22 at 0759 by ALIX HIGGINS RN

-------------------------------------------------------------------------------

RN CLOSING NOTE****

## 2022-11-14 NOTE — NUR
RN OPENING NOTE



RECEIVED PATIENT IN BED, AWAKE. PATIENT IS A/O X 1, CONFUSED SAYING SHE KNOWS ME SO WELL, 
PATIENT IS BLIND, FOLLOWS COMMANDS, PATIENT IS ON SOFT RESTRAINTS ON THE RIGHT HAND D/T HER 
PULLING HER IV LINES AND TUBES. WITH LFA 22G WITH D5NS @75 ML/HR PATENT, FLUSHING WELL.  
PATIENT DOES NOT REPORT ANY PAIN AT THIS TIME. WITH GT WITH ONGOING GLUCERNA 1.2 45 ML/HR, 
TOLERATING WELL. NO RESIDUAL. GT PATENT AND INTACT, IN APPROPRIATE PLACE. PATIENT NOT IN ANY 
APPARENT DISTRESS. SAFETY MEASURES IN PLACE: BED LOCKED AND IN LOWEST POSITION, CALL LIGHT 
WITHIN REACH, SIDE RAILS UP. WILL MONITOR PATIENT DURING MY SHIFT.

## 2022-11-14 NOTE — NUR
RN OPENING NOTES



RECEIVED PT IN BED, AWAKE. AOx1, CONFUSED. ON RA AND TOLERATING WELL. NO SOB NOTED. NO S/SX 
OF RESPIRATORY DISTRESS NOTED. IV ACCESS IN LFA #22G RUNNING D5NS @ 75 ML/HR. SAFETY 
PRECAUTIONS IN PLACE: BED IN LOWEST, LOCKED POSITION, SIDERAILS UPx2, AND BRAKES ON. TABLE 
AND CALL LIGHT WITHIN REACH. ALL NEEDS MET AT THIS TIME.

## 2022-11-14 NOTE — NUR
RN NOTES



CRUZ REBOLLAR, CALLED TO ADVISE TO GIVE 12.5 MG SEROQUEL ONCE TIME AND NOT GIVE THE 1300 
DOSE.

## 2022-11-14 NOTE — NUR
RN CLOSING NOTE



PATIENT IN BED, AWAKE. PATIENT IS A/O X 1 AT THIS TIME, CONFUSED. INFORMED YURIDIA DREW NP 
REGARDING PATIENT BEING RESTLESS DESPITE BEING ADMINISTERED  XANAX , PULLING ON HER GTUBE, 
DISROBING, AND PULLING OUT IV ACCESS. OBTAINED ORDER FOR R SOFT WRIST RESTRAINS. PATIENT 
DOES NOT REPORT ANY PAIN AT THIS TIME. PATIENT HAS A GT WITH ONGOING GLUCERNA 1.2 45 ML/HR, 
TOLERATING WELL. NO RESIDUAL. GT PATENT AND INTACT, IN APPROPRIATE PLACE. PATIENT NOT IN ANY 
APPARENT DISTRESS. SAFETY MEASURES IN PLACE: BED LOCKED AND IN LOWEST POSITION, CALL LIGHT 
WITHIN REACH, SIDE RAILS UP. ALL NEEDS MET AND ATTENDED. ALL ORDERS CARRIED OUT. ENDORSED 
PATIENT TO DAY SHIFT RN FOR CRYSTAL.

## 2022-11-15 VITALS — SYSTOLIC BLOOD PRESSURE: 171 MMHG | DIASTOLIC BLOOD PRESSURE: 70 MMHG

## 2022-11-15 VITALS — DIASTOLIC BLOOD PRESSURE: 66 MMHG | SYSTOLIC BLOOD PRESSURE: 146 MMHG

## 2022-11-15 VITALS — DIASTOLIC BLOOD PRESSURE: 60 MMHG | SYSTOLIC BLOOD PRESSURE: 139 MMHG

## 2022-11-15 LAB
BASOPHILS # BLD AUTO: 0.1 K/UL (ref 0–0.2)
BASOPHILS NFR BLD AUTO: 1.3 % (ref 0–2)
BUN SERPL-MCNC: 9 MG/DL (ref 7–18)
CALCIUM SERPL-MCNC: 9.5 MG/DL (ref 8.5–10.1)
CHLORIDE SERPL-SCNC: 113 MMOL/L (ref 98–107)
CO2 SERPL-SCNC: 28 MMOL/L (ref 21–32)
CREAT SERPL-MCNC: 0.5 MG/DL (ref 0.6–1.3)
EOSINOPHIL NFR BLD AUTO: 3.9 % (ref 0–6)
GLUCOSE SERPL-MCNC: 96 MG/DL (ref 74–106)
HCT VFR BLD AUTO: 38 % (ref 33–45)
HGB BLD-MCNC: 12.4 G/DL (ref 11.5–14.8)
LYMPHOCYTES NFR BLD AUTO: 1.9 K/UL (ref 0.8–4.8)
LYMPHOCYTES NFR BLD AUTO: 31 % (ref 20–44)
MAGNESIUM SERPL-MCNC: 1.8 MG/DL (ref 1.8–2.4)
MCHC RBC AUTO-ENTMCNC: 33 G/DL (ref 31–36)
MCV RBC AUTO: 83 FL (ref 82–100)
MONOCYTES NFR BLD AUTO: 0.6 K/UL (ref 0.1–1.3)
MONOCYTES NFR BLD AUTO: 9 % (ref 2–12)
NEUTROPHILS # BLD AUTO: 3.4 K/UL (ref 1.8–8.9)
NEUTROPHILS NFR BLD AUTO: 54.8 % (ref 43–81)
PHOSPHATE SERPL-MCNC: 3.5 MG/DL (ref 2.5–4.9)
PLATELET # BLD AUTO: 298 K/UL (ref 150–450)
POTASSIUM SERPL-SCNC: 4 MMOL/L (ref 3.5–5.1)
RBC # BLD AUTO: 4.56 MIL/UL (ref 4–5.2)
SODIUM SERPL-SCNC: 145 MMOL/L (ref 136–145)
WBC NRBC COR # BLD AUTO: 6.2 K/UL (ref 4.3–11)

## 2022-11-15 PROCEDURE — 05HC33Z INSERTION OF INFUSION DEVICE INTO LEFT BASILIC VEIN, PERCUTANEOUS APPROACH: ICD-10-PCS | Performed by: NURSE PRACTITIONER

## 2022-11-15 RX ADMIN — ATORVASTATIN CALCIUM SCH MG: 40 TABLET, FILM COATED ORAL at 21:26

## 2022-11-15 RX ADMIN — VANCOMYCIN HYDROCHLORIDE SCH MG: 125 CAPSULE ORAL at 05:55

## 2022-11-15 RX ADMIN — DIVALPROEX SODIUM SCH MG: 125 CAPSULE ORAL at 17:05

## 2022-11-15 RX ADMIN — VANCOMYCIN HYDROCHLORIDE SCH MG: 125 CAPSULE ORAL at 00:47

## 2022-11-15 RX ADMIN — DEXTROSE AND SODIUM CHLORIDE SCH MLS/HR: 5; 900 INJECTION, SOLUTION INTRAVENOUS at 12:54

## 2022-11-15 RX ADMIN — VANCOMYCIN HYDROCHLORIDE SCH MG: 125 CAPSULE ORAL at 12:54

## 2022-11-15 RX ADMIN — Medication PRN OZ: at 09:51

## 2022-11-15 RX ADMIN — DIVALPROEX SODIUM SCH MG: 125 CAPSULE ORAL at 09:36

## 2022-11-15 RX ADMIN — LOSARTAN POTASSIUM SCH MG: 50 TABLET, FILM COATED ORAL at 09:36

## 2022-11-15 RX ADMIN — Medication SCH EACH: at 12:22

## 2022-11-15 RX ADMIN — Medication SCH EACH: at 22:11

## 2022-11-15 RX ADMIN — VANCOMYCIN HYDROCHLORIDE SCH MG: 125 CAPSULE ORAL at 17:05

## 2022-11-15 RX ADMIN — ACETAMINOPHEN SCH MG: 160 SOLUTION ORAL at 09:36

## 2022-11-15 RX ADMIN — Medication SCH EACH: at 06:41

## 2022-11-15 RX ADMIN — Medication SCH EACH: at 17:06

## 2022-11-15 RX ADMIN — ASPIRIN 81 MG SCH MG: 81 TABLET ORAL at 17:05

## 2022-11-15 RX ADMIN — QUETIAPINE SCH MG: 25 TABLET, FILM COATED ORAL at 21:26

## 2022-11-15 RX ADMIN — VANCOMYCIN HYDROCHLORIDE SCH MG: 125 CAPSULE ORAL at 23:54

## 2022-11-15 RX ADMIN — Medication SCH OZ: at 09:52

## 2022-11-15 RX ADMIN — QUETIAPINE SCH MG: 25 TABLET, FILM COATED ORAL at 12:55

## 2022-11-15 RX ADMIN — MUPIROCIN SCH APPLIC: 20 OINTMENT TOPICAL at 13:21

## 2022-11-15 RX ADMIN — TRAMADOL HYDROCHLORIDE SCH MG: 50 TABLET, FILM COATED ORAL at 09:36

## 2022-11-15 RX ADMIN — Medication PRN OZ: at 09:49

## 2022-11-15 RX ADMIN — LOSARTAN POTASSIUM SCH MG: 50 TABLET, FILM COATED ORAL at 17:05

## 2022-11-15 RX ADMIN — MUPIROCIN SCH APPLIC: 20 OINTMENT TOPICAL at 00:47

## 2022-11-15 RX ADMIN — TRAMADOL HYDROCHLORIDE SCH MG: 50 TABLET, FILM COATED ORAL at 17:05

## 2022-11-15 RX ADMIN — PANTOPRAZOLE SODIUM SCH MG: 40 GRANULE, DELAYED RELEASE ORAL at 09:37

## 2022-11-15 RX ADMIN — LORAZEPAM PRN MG: 0.5 TABLET ORAL at 18:55

## 2022-11-15 RX ADMIN — AMLODIPINE BESYLATE SCH MG: 5 TABLET ORAL at 09:37

## 2022-11-15 RX ADMIN — INSULIN HUMAN PRN UNITS: 100 INJECTION, SOLUTION PARENTERAL at 06:41

## 2022-11-15 NOTE — NUR
RN OPENING NOTES;



RECEIVED PT IN BED ASLEEP BUT EASY TO AROUSED,AOx1, CONFUSED. ON RA AND TOLERATING WELL. NO 
SOB/DISTRESS NOTED. IV ACCESS RUKHSANA ML 18G INTACT,RUNNING D5NS @ 75 ML/HR.SAFETY PRECAUTIONS 
IN PLACE: BED IN LOWEST, LOCKED POSITION, SIDERAILS UPx2, AND BRAKES ON. TABLE AND CALL 
LIGHT WITHIN REACH. WILL CONTINUE TO MONITOR.

## 2022-11-15 NOTE — NUR
MS RN OPENING NOTE



RECEIVED PATIENT IN BED, AWAKE. PATIENT IS A/O X 1, CONFUSED, PATIENT IS BLIND, PATIENT IS 
ON SOFT RESTRAINTS ON THE RIGHT HAND D/T HER PULLING HER IV LINES AND TUBES. WITH LFA 22G 
WITH D5NS @75 ML/HR PATENT, FLUSHING WELL.  PATIENT DOES NOT REPORT ANY PAIN AT THIS TIME. 
WITH GT WITH ONGOING GLUCERNA 1.2 45 ML/HR, TOLERATING WELL. NO RESIDUAL SEEN. GT PATENT AND 
INTACT, IN APPROPRIATE PLACE. PATIENT NOT IN ANY APPARENT DISTRESS. SAFETY MEASURES IN 
PLACE: BED LOCKED AND IN LOWEST POSITION, CALL LIGHT WITHIN REACH, SIDE RAILS UP. WILL 
CONTINUE TO MONITOR DURING MY SHIFT.

## 2022-11-15 NOTE — NUR
RN NOTES 



PATIENT MANAGED TO RELEASE HER HAND OFF RESTRAINTS, PULLED HER LEFT ARM IV ACCESS. BLEEDING 
STOPPED. CHANGED PATIENT'S DIAPERS, STILL WITH WATERY STOOLS FOR THE 6TH TIME TODAY. PLACED 
PATIENT BACK TO RESTRAINTS AS SHE MIGHT PULL G-TUBE, REPOSITIONED PATIENT TO NOT BE ABLE TO 
REACH THE TUBES.

## 2022-11-15 NOTE — NUR
RN CLOSING NOTES



PT IN BED, ASLEEP, AWAKENS TO VERBAL STIMULI. AOx1, CONFUSED. ON RA AND TOLERATING WELL. NO 
SOB NOTED. NO S/SX OF RESPIRATORY DISTRESS NOTED. IV ACCESS IN LFA #22G RUNNING D5NS @ 75 
ML/HR. ALL ORDERS CARRIED OUT. ALL NEEDS MET. PT KEPT CLEAN AND DRY. SAFETY PRECAUTIONS IN 
PLACE: BED IN LOWEST, LOCKED POSITION, SIDERAILS UPx2, AND BRAKES ON. TABLE AND CALL LIGHT 
WITHIN REACH. WILL ENDORSE TO ONCOMING SHIFT FOR CRYSTAL.

## 2022-11-15 NOTE — NUR
MS RN CLOSING NOTE



PATIENT IN BED, AWAKE. PATIENT IS A/O X 1, CONFUSED, PATIENT IS BLIND, PATIENT IS ON SOFT 
RESTRAINTS ON THE RIGHT HAND D/T HER PULLING HER IV LINES AND TUBES. WITH LFA 22G WITH D5NS 
@75 ML/HR PATENT, FLUSHING WELL.  PATIENT DOES NOT REPORT ANY PAIN AT THIS TIME. WITH GT 
WITH ONGOING GLUCERNA 1.2 45 ML/HR, TOLERATING WELL. NO RESIDUAL SEEN. GT PATENT AND INTACT, 
IN APPROPRIATE PLACE. PATIENT APPEARS TO BE AGITATED AND ANXIOUS, GIVEN LORAZEPAM 0.25 AT 
1857 VIA GT. MIDLINE WAS JUST INSERTED ON RUKHSANA G#18. ENDORSED TO THE NIGHT SHIFT NURSE. ALL 
NEEDS MEDS MET, ALL MEDS GIVEN, SAFETY MEASURES IN PLACE: BED LOCKED AND IN LOWEST POSITION, 
CALL LIGHT WITHIN REACH, SIDE RAILS UP. ENDORSED TO THE ONCOMING NURSE.

## 2022-11-16 VITALS — DIASTOLIC BLOOD PRESSURE: 63 MMHG | SYSTOLIC BLOOD PRESSURE: 127 MMHG

## 2022-11-16 VITALS — DIASTOLIC BLOOD PRESSURE: 70 MMHG | SYSTOLIC BLOOD PRESSURE: 133 MMHG

## 2022-11-16 VITALS — DIASTOLIC BLOOD PRESSURE: 72 MMHG | SYSTOLIC BLOOD PRESSURE: 139 MMHG

## 2022-11-16 LAB
ALBUMIN SERPL BCP-MCNC: 2.6 G/DL (ref 3.4–5)
ALP SERPL-CCNC: 88 U/L (ref 46–116)
ALT SERPL W P-5'-P-CCNC: 24 U/L (ref 12–78)
AST SERPL W P-5'-P-CCNC: 25 U/L (ref 15–37)
BASOPHILS # BLD AUTO: 0 K/UL (ref 0–0.2)
BASOPHILS NFR BLD AUTO: 0.7 % (ref 0–2)
BILIRUB SERPL-MCNC: 0.4 MG/DL (ref 0.2–1)
BUN SERPL-MCNC: 11 MG/DL (ref 7–18)
CALCIUM SERPL-MCNC: 9.3 MG/DL (ref 8.5–10.1)
CHLORIDE SERPL-SCNC: 109 MMOL/L (ref 98–107)
CO2 SERPL-SCNC: 27 MMOL/L (ref 21–32)
CREAT SERPL-MCNC: 0.6 MG/DL (ref 0.6–1.3)
EOSINOPHIL NFR BLD AUTO: 3.3 % (ref 0–6)
GLUCOSE SERPL-MCNC: 107 MG/DL (ref 74–106)
HCT VFR BLD AUTO: 36 % (ref 33–45)
HGB BLD-MCNC: 12 G/DL (ref 11.5–14.8)
LYMPHOCYTES NFR BLD AUTO: 1.5 K/UL (ref 0.8–4.8)
LYMPHOCYTES NFR BLD AUTO: 22.9 % (ref 20–44)
MCHC RBC AUTO-ENTMCNC: 33 G/DL (ref 31–36)
MCV RBC AUTO: 83 FL (ref 82–100)
MONOCYTES NFR BLD AUTO: 0.6 K/UL (ref 0.1–1.3)
MONOCYTES NFR BLD AUTO: 9.2 % (ref 2–12)
NEUTROPHILS # BLD AUTO: 4.2 K/UL (ref 1.8–8.9)
NEUTROPHILS NFR BLD AUTO: 63.9 % (ref 43–81)
PLATELET # BLD AUTO: 307 K/UL (ref 150–450)
POTASSIUM SERPL-SCNC: 3.3 MMOL/L (ref 3.5–5.1)
PROT SERPL-MCNC: 6.3 G/DL (ref 6.4–8.2)
RBC # BLD AUTO: 4.35 MIL/UL (ref 4–5.2)
SODIUM SERPL-SCNC: 143 MMOL/L (ref 136–145)
VALPROATE SERPL-MCNC: 33 UG/ML (ref 50–100)
WBC NRBC COR # BLD AUTO: 6.7 K/UL (ref 4.3–11)

## 2022-11-16 RX ADMIN — VANCOMYCIN HYDROCHLORIDE SCH MG: 125 CAPSULE ORAL at 05:07

## 2022-11-16 RX ADMIN — LOSARTAN POTASSIUM SCH MG: 50 TABLET, FILM COATED ORAL at 09:51

## 2022-11-16 RX ADMIN — MUPIROCIN SCH APPLIC: 20 OINTMENT TOPICAL at 00:04

## 2022-11-16 RX ADMIN — LOSARTAN POTASSIUM SCH MG: 50 TABLET, FILM COATED ORAL at 17:56

## 2022-11-16 RX ADMIN — VANCOMYCIN HYDROCHLORIDE SCH MG: 125 CAPSULE ORAL at 17:53

## 2022-11-16 RX ADMIN — DIVALPROEX SODIUM SCH MG: 125 CAPSULE ORAL at 17:53

## 2022-11-16 RX ADMIN — DEXTROSE AND SODIUM CHLORIDE SCH MLS/HR: 5; 900 INJECTION, SOLUTION INTRAVENOUS at 00:15

## 2022-11-16 RX ADMIN — DEXTROSE AND SODIUM CHLORIDE SCH MLS/HR: 5; 900 INJECTION, SOLUTION INTRAVENOUS at 17:56

## 2022-11-16 RX ADMIN — QUETIAPINE SCH MG: 25 TABLET, FILM COATED ORAL at 21:16

## 2022-11-16 RX ADMIN — Medication SCH EACH: at 17:56

## 2022-11-16 RX ADMIN — AMLODIPINE BESYLATE SCH MG: 5 TABLET ORAL at 09:52

## 2022-11-16 RX ADMIN — TRAMADOL HYDROCHLORIDE SCH MG: 50 TABLET, FILM COATED ORAL at 17:53

## 2022-11-16 RX ADMIN — VANCOMYCIN HYDROCHLORIDE SCH MG: 125 CAPSULE ORAL at 23:37

## 2022-11-16 RX ADMIN — MUPIROCIN SCH APPLIC: 20 OINTMENT TOPICAL at 13:00

## 2022-11-16 RX ADMIN — DIVALPROEX SODIUM SCH MG: 125 CAPSULE ORAL at 09:50

## 2022-11-16 RX ADMIN — Medication SCH EACH: at 21:58

## 2022-11-16 RX ADMIN — VANCOMYCIN HYDROCHLORIDE SCH MG: 125 CAPSULE ORAL at 12:36

## 2022-11-16 RX ADMIN — ACETAMINOPHEN SCH MG: 160 SOLUTION ORAL at 09:50

## 2022-11-16 RX ADMIN — Medication SCH EACH: at 12:55

## 2022-11-16 RX ADMIN — Medication SCH OZ: at 09:50

## 2022-11-16 RX ADMIN — QUETIAPINE SCH MG: 25 TABLET, FILM COATED ORAL at 12:37

## 2022-11-16 RX ADMIN — ATORVASTATIN CALCIUM SCH MG: 40 TABLET, FILM COATED ORAL at 21:16

## 2022-11-16 RX ADMIN — TRAMADOL HYDROCHLORIDE SCH MG: 50 TABLET, FILM COATED ORAL at 09:50

## 2022-11-16 RX ADMIN — PANTOPRAZOLE SODIUM SCH MG: 40 GRANULE, DELAYED RELEASE ORAL at 09:50

## 2022-11-16 RX ADMIN — ASPIRIN 81 MG SCH MG: 81 TABLET ORAL at 17:53

## 2022-11-16 RX ADMIN — Medication SCH EACH: at 07:37

## 2022-11-16 NOTE — NUR
RN CLOSING   [id number]:



PT IN BED SLEEPING BUT EASY TO AROUSED, NON VERBAL,TRACH INTACT ON COOL AEROSOL CARLA WELL NO 
SIGN SOB/DISTRESS NOTED,DUE MEDS GIVEN AS ORDER,ALL NEEDS ATTENDED,IV ACCESS ON LEFT HAND 
GAUGE 20 PATENT INTACT AND INFUSING NS @ 75ML/HR.GTUBE FEEDING INTACT GLUCERNA 1.2 
@60ML/HR.CARLA WELL,NO RESIDUAL NOTED, ALL  SAFETY MEASURES INITIATED: BED LOCKED AND IN 
LOWEST POSITION, SIDE RAILS UP X 2. CALL LIGHT IN EASY R EACH FOR HELP. HOB ELEVATED FOR 
ASPIRATION PRECAUTION. WILL ENDORSED TO NEXT SHIFT.

## 2022-11-17 VITALS — DIASTOLIC BLOOD PRESSURE: 72 MMHG | SYSTOLIC BLOOD PRESSURE: 128 MMHG

## 2022-11-17 VITALS — SYSTOLIC BLOOD PRESSURE: 131 MMHG | DIASTOLIC BLOOD PRESSURE: 66 MMHG

## 2022-11-17 VITALS — SYSTOLIC BLOOD PRESSURE: 150 MMHG | DIASTOLIC BLOOD PRESSURE: 58 MMHG

## 2022-11-17 LAB
BUN SERPL-MCNC: 11 MG/DL (ref 7–18)
CALCIUM SERPL-MCNC: 9.6 MG/DL (ref 8.5–10.1)
CHLORIDE SERPL-SCNC: 107 MMOL/L (ref 98–107)
CO2 SERPL-SCNC: 26 MMOL/L (ref 21–32)
CREAT SERPL-MCNC: 0.6 MG/DL (ref 0.6–1.3)
GLUCOSE SERPL-MCNC: 116 MG/DL (ref 74–106)
POTASSIUM SERPL-SCNC: 3.5 MMOL/L (ref 3.5–5.1)
SODIUM SERPL-SCNC: 143 MMOL/L (ref 136–145)

## 2022-11-17 RX ADMIN — SODIUM CHLORIDE PRN MLS/HR: 9 INJECTION, SOLUTION INTRAVENOUS at 16:05

## 2022-11-17 RX ADMIN — PANTOPRAZOLE SODIUM SCH MG: 40 GRANULE, DELAYED RELEASE ORAL at 08:40

## 2022-11-17 RX ADMIN — QUETIAPINE SCH MG: 25 TABLET, FILM COATED ORAL at 12:30

## 2022-11-17 RX ADMIN — TRAMADOL HYDROCHLORIDE SCH MG: 50 TABLET, FILM COATED ORAL at 17:45

## 2022-11-17 RX ADMIN — DIVALPROEX SODIUM SCH MG: 125 CAPSULE ORAL at 08:40

## 2022-11-17 RX ADMIN — MUPIROCIN SCH APPLIC: 20 OINTMENT TOPICAL at 13:11

## 2022-11-17 RX ADMIN — DIVALPROEX SODIUM SCH MG: 125 CAPSULE ORAL at 17:43

## 2022-11-17 RX ADMIN — LOSARTAN POTASSIUM SCH MG: 50 TABLET, FILM COATED ORAL at 08:40

## 2022-11-17 RX ADMIN — Medication SCH EACH: at 17:41

## 2022-11-17 RX ADMIN — DEXTROSE AND SODIUM CHLORIDE SCH MLS/HR: 5; 900 INJECTION, SOLUTION INTRAVENOUS at 03:32

## 2022-11-17 RX ADMIN — QUETIAPINE SCH MG: 25 TABLET, FILM COATED ORAL at 21:28

## 2022-11-17 RX ADMIN — Medication SCH EACH: at 06:33

## 2022-11-17 RX ADMIN — VANCOMYCIN HYDROCHLORIDE SCH MG: 125 CAPSULE ORAL at 13:05

## 2022-11-17 RX ADMIN — ATORVASTATIN CALCIUM SCH MG: 40 TABLET, FILM COATED ORAL at 21:28

## 2022-11-17 RX ADMIN — AMLODIPINE BESYLATE SCH MG: 5 TABLET ORAL at 08:41

## 2022-11-17 RX ADMIN — Medication SCH EACH: at 12:29

## 2022-11-17 RX ADMIN — TRAMADOL HYDROCHLORIDE SCH MG: 50 TABLET, FILM COATED ORAL at 08:40

## 2022-11-17 RX ADMIN — ACETAMINOPHEN SCH MG: 160 SOLUTION ORAL at 08:42

## 2022-11-17 RX ADMIN — Medication SCH OZ: at 09:09

## 2022-11-17 RX ADMIN — MUPIROCIN SCH APPLIC: 20 OINTMENT TOPICAL at 01:23

## 2022-11-17 RX ADMIN — LOSARTAN POTASSIUM SCH MG: 50 TABLET, FILM COATED ORAL at 17:44

## 2022-11-17 RX ADMIN — ASPIRIN 81 MG SCH MG: 81 TABLET ORAL at 17:52

## 2022-11-17 RX ADMIN — VANCOMYCIN HYDROCHLORIDE SCH MG: 125 CAPSULE ORAL at 05:20

## 2022-11-17 RX ADMIN — VANCOMYCIN HYDROCHLORIDE SCH MG: 125 CAPSULE ORAL at 18:41

## 2022-11-17 NOTE — NUR
RN CLOSING NOTES;



PT IN BED ASLEEP BUT EASY TO AROUSED,AOx1. ON RA AND TOLERATING WELL. NO SOB/DISTRESS NOTED. 
IV ACCESS RUKHSANA ML 18G INTACT,RUNNING D5NS @ 75 ML/HR. ON RESTRAINT @ RT HAND, NO 
BLEEDING/REDNESS NOTED. DUE MEDS GIVEN. KEPT COMFORTABLE. SAFETY PRECAUTIONS IN PLACE: BED 
IN LOWEST, LOCKED POSITION, SIDERAILS UPx2, AND BRAKES ON. TABLE AND CALL LIGHT WITHIN 
REACH. WILL CONTINUE TO MONITOR.

## 2022-11-17 NOTE — NUR
RN OPENING NOTES;



RECEIVED PT IN BED ASLEEP BUT EASY TO AROUSED,AOx1. ON RA AND TOLERATING WELL. NO 
SOB/DISTRESS NOTED. IV ACCESS RUKHSANA ML 18G INTACT,RUNNING D5NS @ 75 ML/HR. ON RESTRAINT @ RT 
HAND, NO BLEEDING/REDNESS NOTED. SAFETY PRECAUTIONS IN PLACE: BED IN LOWEST, LOCKED 
POSITION, SIDERAILS UPx2, AND BRAKES ON. TABLE AND CALL LIGHT WITHIN REACH. WILL CONTINUE TO 
MONITOR.

## 2022-11-17 NOTE — NUR
RN NOTE



PATIENT IN BED, ON SEMI CHAVIRA'S, AO X 1, IN NO ACUTE DISTRESS, SATURATION AT 98% ON ROOM 
AIR, HR IS 88. RUKHSANA MIDLINE PATENT AND FLUSHING WELL, NO S/S OF INFECTION OR INFILTRATION, 
WITH NS INFUSING AT 80 ML/HR. GTUBE IN PLACE, POSITIVE PLACEMENT NOTED, NO RESIDUAL, WITH 
GLUCERNA AT 45 ML/HR. SOFT RESTRAINTS IN PLACE AT R WRIST, SKIN AND CIRCULATION CHECKED AND 
ARE WNL. SAFETY MEASURES IN PLACE, HOB ELEVATED, BED IS LOCKED AND AT LOWEST POSITION, CALL 
LIGHT WITHIN REACH OF PATIENT. WILL CONT TO MONITOR AND REASSESS.

## 2022-11-17 NOTE — NUR
RN CLOSING   NOTES;329



PT IN BED SLEEPING BUT EASY TO AROUSED, NON VERBAL,NO SIGN SOB/DISTRESS NOTED,DUE MEDS GIVEN 
AS ORDER,ALL NEEDS ATTENDED,IV ACCESS ON RUKHSANA ML G 18 PATENT INTACT AND INFUSING D5 NS @ 
75ML/HR.GTUBE FEEDING INTACT GLUCERNA 1.2 @60ML/HR.CARLA WELL,NO RESIDUAL NOTED, ALL  SAFETY 
MEASURES INITIATED: BED LOCKED AND IN LOWEST POSITION, SIDE RAILS UP X 2. CALL LIGHT IN EASY 
R EACH FOR HELP. HOB ELEVATED FOR ASPIRATION PRECAUTION. WILL ENDORSED TO NEXT SHIFT.

## 2022-11-18 VITALS — SYSTOLIC BLOOD PRESSURE: 155 MMHG | DIASTOLIC BLOOD PRESSURE: 78 MMHG

## 2022-11-18 VITALS — SYSTOLIC BLOOD PRESSURE: 144 MMHG | DIASTOLIC BLOOD PRESSURE: 54 MMHG

## 2022-11-18 VITALS — DIASTOLIC BLOOD PRESSURE: 68 MMHG | SYSTOLIC BLOOD PRESSURE: 126 MMHG

## 2022-11-18 VITALS — DIASTOLIC BLOOD PRESSURE: 69 MMHG | SYSTOLIC BLOOD PRESSURE: 126 MMHG

## 2022-11-18 LAB
ALBUMIN SERPL BCP-MCNC: 2.6 G/DL (ref 3.4–5)
ALP SERPL-CCNC: 95 U/L (ref 46–116)
ALT SERPL W P-5'-P-CCNC: 21 U/L (ref 12–78)
AST SERPL W P-5'-P-CCNC: 25 U/L (ref 15–37)
BILIRUB SERPL-MCNC: 0.4 MG/DL (ref 0.2–1)
BILIRUB UR QL STRIP: NEGATIVE
BUN SERPL-MCNC: 10 MG/DL (ref 7–18)
CALCIUM SERPL-MCNC: 9.4 MG/DL (ref 8.5–10.1)
CHLORIDE SERPL-SCNC: 109 MMOL/L (ref 98–107)
CO2 SERPL-SCNC: 25 MMOL/L (ref 21–32)
COLOR UR: YELLOW
CREAT SERPL-MCNC: 0.6 MG/DL (ref 0.6–1.3)
GLUCOSE SERPL-MCNC: 96 MG/DL (ref 74–106)
GLUCOSE UR STRIP-MCNC: NEGATIVE MG/DL
LEUKOCYTE ESTERASE UR QL STRIP: NEGATIVE
NITRITE UR QL STRIP: NEGATIVE
PH UR STRIP: 7.5 [PH] (ref 5–8)
POTASSIUM SERPL-SCNC: 3.4 MMOL/L (ref 3.5–5.1)
PROT SERPL-MCNC: 6.6 G/DL (ref 6.4–8.2)
PROT UR QL STRIP: NEGATIVE MG/DL
SODIUM SERPL-SCNC: 142 MMOL/L (ref 136–145)
UROBILINOGEN UR STRIP-MCNC: 0.2 EU/DL
VALPROATE SERPL-MCNC: 43 UG/ML (ref 50–100)

## 2022-11-18 RX ADMIN — VANCOMYCIN HYDROCHLORIDE SCH MG: 125 CAPSULE ORAL at 00:11

## 2022-11-18 RX ADMIN — Medication PRN ML: at 09:08

## 2022-11-18 RX ADMIN — Medication SCH EACH: at 05:36

## 2022-11-18 RX ADMIN — INSULIN HUMAN PRN UNITS: 100 INJECTION, SOLUTION PARENTERAL at 11:41

## 2022-11-18 RX ADMIN — Medication PRN OZ: at 09:27

## 2022-11-18 RX ADMIN — SODIUM CHLORIDE PRN MLS/HR: 9 INJECTION, SOLUTION INTRAVENOUS at 19:33

## 2022-11-18 RX ADMIN — ASPIRIN 81 MG SCH MG: 81 TABLET ORAL at 17:00

## 2022-11-18 RX ADMIN — ACETAMINOPHEN SCH MG: 160 SOLUTION ORAL at 08:42

## 2022-11-18 RX ADMIN — VANCOMYCIN HYDROCHLORIDE SCH MG: 125 CAPSULE ORAL at 12:41

## 2022-11-18 RX ADMIN — AMLODIPINE BESYLATE SCH MG: 5 TABLET ORAL at 08:45

## 2022-11-18 RX ADMIN — MUPIROCIN SCH APPLIC: 20 OINTMENT TOPICAL at 00:16

## 2022-11-18 RX ADMIN — MUPIROCIN SCH APPLIC: 20 OINTMENT TOPICAL at 13:36

## 2022-11-18 RX ADMIN — SODIUM CHLORIDE PRN MLS/HR: 9 INJECTION, SOLUTION INTRAVENOUS at 04:09

## 2022-11-18 RX ADMIN — TRAMADOL HYDROCHLORIDE SCH MG: 50 TABLET, FILM COATED ORAL at 16:58

## 2022-11-18 RX ADMIN — QUETIAPINE SCH MG: 25 TABLET, FILM COATED ORAL at 12:02

## 2022-11-18 RX ADMIN — Medication SCH OZ: at 09:00

## 2022-11-18 RX ADMIN — PANTOPRAZOLE SODIUM SCH MG: 40 GRANULE, DELAYED RELEASE ORAL at 08:46

## 2022-11-18 RX ADMIN — Medication PRN OZ: at 09:02

## 2022-11-18 RX ADMIN — VANCOMYCIN HYDROCHLORIDE SCH MG: 125 CAPSULE ORAL at 17:00

## 2022-11-18 RX ADMIN — VANCOMYCIN HYDROCHLORIDE SCH MG: 125 CAPSULE ORAL at 05:36

## 2022-11-18 RX ADMIN — Medication PRN OZ: at 09:03

## 2022-11-18 RX ADMIN — Medication SCH EACH: at 00:23

## 2022-11-18 RX ADMIN — DIVALPROEX SODIUM SCH MG: 125 CAPSULE ORAL at 09:00

## 2022-11-18 RX ADMIN — DIVALPROEX SODIUM SCH MG: 125 CAPSULE ORAL at 16:57

## 2022-11-18 RX ADMIN — LOSARTAN POTASSIUM SCH MG: 50 TABLET, FILM COATED ORAL at 08:45

## 2022-11-18 RX ADMIN — QUETIAPINE SCH MG: 25 TABLET, FILM COATED ORAL at 21:34

## 2022-11-18 RX ADMIN — Medication SCH EACH: at 17:24

## 2022-11-18 RX ADMIN — Medication SCH EACH: at 11:40

## 2022-11-18 RX ADMIN — TRAMADOL HYDROCHLORIDE SCH MG: 50 TABLET, FILM COATED ORAL at 08:46

## 2022-11-18 RX ADMIN — ATORVASTATIN CALCIUM SCH MG: 40 TABLET, FILM COATED ORAL at 21:34

## 2022-11-18 RX ADMIN — LOSARTAN POTASSIUM SCH MG: 50 TABLET, FILM COATED ORAL at 16:57

## 2022-11-18 NOTE — NUR
RN OPENING NOTE



PATIENT AWAKE IN BED. A/OX1 (NAME). NO S/S OF DISTRESS, BREATHING WITHOUT DIFFICULTY ON ROOM 
AIR. RUKHSANA MIDLINE #18 INTACT AND PATENT W/ NS 80ML/HR. SAFETY MEASURES IN PLACE: BED LOCKED 
AND AT LOWEST POSITION, RAILS UP X2, CALL BELL WITHIN REACH. WILL CONTINUE TO MONITOR 
PATIENT.

## 2022-11-18 NOTE — NUR
RN OPENING NOTE



PATIENT IN BED, ON SEMI CHAVIRA'S, AO X 1, IN NO ACUTE DISTRESS, SATURATION AT 98% ON ROOM 
AIR. RUKHSANA MIDLINE PATENT AND FLUSHING WELL, NO S/S OF INFECTION OR INFILTRATION, WITH NS 
INFUSING AT 80 ML/HR. GTUBE IN PLACE, POSITIVE PLACEMENT NOTED, NO RESIDUAL, WITH GLUCERNA 
AT 45 ML/HR. SOFT RESTRAINTS IN PLACE AT R WRIST, SKIN AND CIRCULATION CHECKED AND ARE WNL. 
SAFETY MEASURES IN PLACE, HOB ELEVATED, BED IS LOCKED AND AT LOWEST POSITION, CALL LIGHT 
WITHIN REACH OF PATIENT. WILL CONT TO MONITOR AND REASSESS

## 2022-11-18 NOTE — NUR
RN CLOSING NOTE



PATIENT IN BED, ON HIGH CHAVIRA'S, AO X 1, IN NO ACUTE DISTRESS, SATURATION AT 98% ON ROOM 
AIR. RUKHSANA MIDLINE PATENT AND FLUSHING WELL, NO S/S OF INFECTION OR INFILTRATION, WITH NS 
INFUSING AT 80 ML/HR. GTUBE IN PLACE, POSITIVE PLACEMENT NOTED, NO RESIDUAL, WITH GLUCERNA 
AT 45 ML/HR. RESTRAINTS REMOVED AT AROUND 1500H, MITTENS IN PLACE. BRUISED NOTED ON RIGHT 
LOWER ARM, PICTURES TAKEN,MD AWARE. SAFETY MEASURES IN PLACE, HOB ELEVATED, BED IS LOCKED 
AND AT LOWEST POSITION, CALL LIGHT WITHIN REACH OF PATIENT. ENDORSED TO INCOMING STAFF

## 2022-11-19 VITALS — DIASTOLIC BLOOD PRESSURE: 69 MMHG | SYSTOLIC BLOOD PRESSURE: 128 MMHG

## 2022-11-19 VITALS — DIASTOLIC BLOOD PRESSURE: 65 MMHG | SYSTOLIC BLOOD PRESSURE: 126 MMHG

## 2022-11-19 VITALS — SYSTOLIC BLOOD PRESSURE: 129 MMHG | DIASTOLIC BLOOD PRESSURE: 67 MMHG

## 2022-11-19 LAB
BASOPHILS # BLD AUTO: 0.1 K/UL (ref 0–0.2)
BASOPHILS NFR BLD AUTO: 0.7 % (ref 0–2)
BUN SERPL-MCNC: 12 MG/DL (ref 7–18)
CALCIUM SERPL-MCNC: 9.1 MG/DL (ref 8.5–10.1)
CHLORIDE SERPL-SCNC: 109 MMOL/L (ref 98–107)
CO2 SERPL-SCNC: 27 MMOL/L (ref 21–32)
CREAT SERPL-MCNC: 0.6 MG/DL (ref 0.6–1.3)
EOSINOPHIL NFR BLD AUTO: 2.9 % (ref 0–6)
GLUCOSE SERPL-MCNC: 111 MG/DL (ref 74–106)
HCT VFR BLD AUTO: 39 % (ref 33–45)
HGB BLD-MCNC: 12.8 G/DL (ref 11.5–14.8)
LYMPHOCYTES NFR BLD AUTO: 2.1 K/UL (ref 0.8–4.8)
LYMPHOCYTES NFR BLD AUTO: 27.2 % (ref 20–44)
MAGNESIUM SERPL-MCNC: 1.6 MG/DL (ref 1.8–2.4)
MCHC RBC AUTO-ENTMCNC: 33 G/DL (ref 31–36)
MCV RBC AUTO: 83 FL (ref 82–100)
MONOCYTES NFR BLD AUTO: 0.6 K/UL (ref 0.1–1.3)
MONOCYTES NFR BLD AUTO: 7.2 % (ref 2–12)
NEUTROPHILS # BLD AUTO: 4.9 K/UL (ref 1.8–8.9)
NEUTROPHILS NFR BLD AUTO: 62 % (ref 43–81)
PLATELET # BLD AUTO: 309 K/UL (ref 150–450)
POTASSIUM SERPL-SCNC: 3.8 MMOL/L (ref 3.5–5.1)
RBC # BLD AUTO: 4.75 MIL/UL (ref 4–5.2)
SODIUM SERPL-SCNC: 142 MMOL/L (ref 136–145)
WBC NRBC COR # BLD AUTO: 7.9 K/UL (ref 4.3–11)

## 2022-11-19 RX ADMIN — Medication SCH EACH: at 17:27

## 2022-11-19 RX ADMIN — Medication SCH OZ: at 08:46

## 2022-11-19 RX ADMIN — LOSARTAN POTASSIUM SCH MG: 50 TABLET, FILM COATED ORAL at 17:01

## 2022-11-19 RX ADMIN — TRAMADOL HYDROCHLORIDE SCH MG: 50 TABLET, FILM COATED ORAL at 08:38

## 2022-11-19 RX ADMIN — Medication SCH EACH: at 12:32

## 2022-11-19 RX ADMIN — MAGNESIUM SULFATE IN DEXTROSE SCH MLS/HR: 10 INJECTION, SOLUTION INTRAVENOUS at 11:10

## 2022-11-19 RX ADMIN — TRAMADOL HYDROCHLORIDE SCH MG: 50 TABLET, FILM COATED ORAL at 17:01

## 2022-11-19 RX ADMIN — DIVALPROEX SODIUM SCH MG: 125 CAPSULE ORAL at 08:39

## 2022-11-19 RX ADMIN — DIVALPROEX SODIUM SCH MG: 125 CAPSULE ORAL at 17:01

## 2022-11-19 RX ADMIN — LOSARTAN POTASSIUM SCH MG: 50 TABLET, FILM COATED ORAL at 08:38

## 2022-11-19 RX ADMIN — ASPIRIN 81 MG SCH MG: 81 TABLET ORAL at 17:01

## 2022-11-19 RX ADMIN — VANCOMYCIN HYDROCHLORIDE SCH MG: 125 CAPSULE ORAL at 12:26

## 2022-11-19 RX ADMIN — ATORVASTATIN CALCIUM SCH MG: 40 TABLET, FILM COATED ORAL at 21:00

## 2022-11-19 RX ADMIN — ACETAMINOPHEN SCH MG: 160 SOLUTION ORAL at 08:38

## 2022-11-19 RX ADMIN — QUETIAPINE SCH MG: 25 TABLET, FILM COATED ORAL at 12:23

## 2022-11-19 RX ADMIN — Medication PRN ML: at 08:36

## 2022-11-19 RX ADMIN — INSULIN HUMAN PRN UNITS: 100 INJECTION, SOLUTION PARENTERAL at 05:32

## 2022-11-19 RX ADMIN — MUPIROCIN SCH APPLIC: 20 OINTMENT TOPICAL at 12:33

## 2022-11-19 RX ADMIN — SODIUM CHLORIDE PRN MLS/HR: 9 INJECTION, SOLUTION INTRAVENOUS at 08:34

## 2022-11-19 RX ADMIN — VANCOMYCIN HYDROCHLORIDE SCH MG: 125 CAPSULE ORAL at 17:00

## 2022-11-19 RX ADMIN — MUPIROCIN SCH APPLIC: 20 OINTMENT TOPICAL at 00:05

## 2022-11-19 RX ADMIN — Medication SCH EACH: at 00:04

## 2022-11-19 RX ADMIN — QUETIAPINE SCH MG: 25 TABLET, FILM COATED ORAL at 20:59

## 2022-11-19 RX ADMIN — AMLODIPINE BESYLATE SCH MG: 5 TABLET ORAL at 08:39

## 2022-11-19 RX ADMIN — VANCOMYCIN HYDROCHLORIDE SCH MG: 125 CAPSULE ORAL at 05:32

## 2022-11-19 RX ADMIN — PANTOPRAZOLE SODIUM SCH MG: 40 GRANULE, DELAYED RELEASE ORAL at 08:38

## 2022-11-19 RX ADMIN — MAGNESIUM SULFATE IN DEXTROSE SCH MLS/HR: 10 INJECTION, SOLUTION INTRAVENOUS at 12:23

## 2022-11-19 RX ADMIN — Medication SCH EACH: at 05:32

## 2022-11-19 RX ADMIN — VANCOMYCIN HYDROCHLORIDE SCH MG: 125 CAPSULE ORAL at 00:04

## 2022-11-19 NOTE — NUR
MS RN OPENING NOTE



RECEIVED PATIENT IN BED, AWAKE. PATIENT IS A/O X 1, CONFUSED, PATIENT IS BLIND, PATIENT HAS 
MITTEN ON THE RIGHT HAND D/T HER PULLING HER IV LINES AND TUBES. WITH RUKHSANA MIDLINE G18 WITH 
NS RUNNING AT 80 ML/HR PATENT, FLUSHING WELL.  PATIENT DOES NOT REPORT ANY PAIN AT THIS 
TIME. WITH GT WITH ONGOING GLUCERNA 1.2 45 ML/HR, TOLERATING WELL. NO RESIDUAL SEEN. GT 
PATENT AND INTACT, IN APPROPRIATE PLACE. PATIENT NOT IN ANY APPARENT DISTRESS. SAFETY 
MEASURES IN PLACE: BED LOCKED AND IN LOWEST POSITION, CALL LIGHT WITHIN REACH, SIDE RAILS 
UP. WILL CONTINUE TO MONITOR DURING MY SHIFT.

## 2022-11-19 NOTE — NUR
RN CLOSING NOTE



PATIENT AWAKE IN BED. A/OX1 (NAME). NO S/S OF DISTRESS, BREATHING WITHOUT DIFFICULTY ON ROOM 
AIR. RUKHSANA MIDLINE #18 INTACT AND PATENT W/ NS 80ML/HR. GLUCERNA 1.2 @45ML/HR. SAFETY MEASURES 
IN PLACE: BED LOCKED AND AT LOWEST POSITION, RAILS UP X2, CALL BELL WITHIN REACH. WILL 
ENDORSE TO NEXT SHIFT FOR CRYSTAL.

## 2022-11-19 NOTE — NUR
MS RN CLOSING NOTE



PATIENT IN BED, AWAKE, PATIENT IS A/OX1, LEGALLY BLIND, FOLLOWS COMMANDS, RIGHT HAND D/T HER 
PULLING HER IV LINES AND TUBES. WITH LFA 22G WITH D5NS @75 ML/HR PATENT, FLUSHING WELL. 
PATIENT DOES NOT REPORT ANY PAIN AT THIS TIME. WITH GT WITH ONGOING GLUCERNA 1.2 45 ML/HR, 
TOLERATING WELL. NO RESIDUAL. GT PATENT AND INTACT, IN APPROPRIATE PLACE. PATIENT NOT IN ANY 
APPARENT DISTRESS. ALL DUE MEDS GIVEN, PATIENT KEPT SAFE, ALL NEEDS MET. SAFETY MEASURES 
MAINTAINED: BED LOCKED AND IN LOWEST POSITION, CALL LIGHT WITHIN REACH, SIDE RAILS UP. 
ENDORSED TO NIGHT SHIFT NURSE.

## 2022-11-19 NOTE — NUR
MS RN OPENING NOTE



RECEIVED PT AWAKE IN BED. A/O X1 TO NAME ONLY. PT STABLE ON ROOM AIR. NO SOB OR S/S OF 
RESPIRATORY DISTRESS. BREATHING EVEN AND UNLABORED. IV ACCESS RUKHSANA ML 18G SL, INTACT AND 
PATENT, RUNNING NS @ 80 ML/HR. G TUBE IN PLACE, INTACT AND PATENT, RUNNING GLUCERNA 1.2 @ 45 
ML/HR, NO RESIDUAL NOTED. WITH MITTEN RESTRAINTS, CIRCULATION CHECKED AND WNL. SAFETY 
PRECAUTIONS IN PLACE. BED IN LOWEST LOCKED POSITION, HOB ELEVATED, SIDE RAILS UP X3, AND 
CALL LIGHT AND TABLE WITHIN REACH. ALL NEEDS MET AT THIS TIME.

## 2022-11-20 VITALS — SYSTOLIC BLOOD PRESSURE: 143 MMHG | DIASTOLIC BLOOD PRESSURE: 72 MMHG

## 2022-11-20 VITALS — DIASTOLIC BLOOD PRESSURE: 69 MMHG | SYSTOLIC BLOOD PRESSURE: 120 MMHG

## 2022-11-20 RX ADMIN — LOSARTAN POTASSIUM SCH MG: 50 TABLET, FILM COATED ORAL at 17:21

## 2022-11-20 RX ADMIN — DIVALPROEX SODIUM SCH MG: 125 CAPSULE ORAL at 17:21

## 2022-11-20 RX ADMIN — VANCOMYCIN HYDROCHLORIDE SCH MG: 125 CAPSULE ORAL at 12:02

## 2022-11-20 RX ADMIN — Medication SCH EACH: at 17:30

## 2022-11-20 RX ADMIN — TRAMADOL HYDROCHLORIDE SCH MG: 50 TABLET, FILM COATED ORAL at 09:11

## 2022-11-20 RX ADMIN — Medication SCH OZ: at 09:31

## 2022-11-20 RX ADMIN — MUPIROCIN SCH APPLIC: 20 OINTMENT TOPICAL at 12:03

## 2022-11-20 RX ADMIN — VANCOMYCIN HYDROCHLORIDE SCH MG: 125 CAPSULE ORAL at 00:09

## 2022-11-20 RX ADMIN — Medication PRN ML: at 17:28

## 2022-11-20 RX ADMIN — ACETAMINOPHEN SCH MG: 160 SOLUTION ORAL at 09:11

## 2022-11-20 RX ADMIN — QUETIAPINE SCH MG: 25 TABLET, FILM COATED ORAL at 12:02

## 2022-11-20 RX ADMIN — PANTOPRAZOLE SODIUM SCH MG: 40 GRANULE, DELAYED RELEASE ORAL at 09:10

## 2022-11-20 RX ADMIN — Medication SCH EACH: at 00:25

## 2022-11-20 RX ADMIN — MUPIROCIN SCH APPLIC: 20 OINTMENT TOPICAL at 00:09

## 2022-11-20 RX ADMIN — Medication SCH EACH: at 05:48

## 2022-11-20 RX ADMIN — LOSARTAN POTASSIUM SCH MG: 50 TABLET, FILM COATED ORAL at 09:10

## 2022-11-20 RX ADMIN — TRAMADOL HYDROCHLORIDE SCH MG: 50 TABLET, FILM COATED ORAL at 17:21

## 2022-11-20 RX ADMIN — AMLODIPINE BESYLATE SCH MG: 5 TABLET ORAL at 09:11

## 2022-11-20 RX ADMIN — DIVALPROEX SODIUM SCH MG: 125 CAPSULE ORAL at 09:11

## 2022-11-20 RX ADMIN — SODIUM CHLORIDE PRN MLS/HR: 9 INJECTION, SOLUTION INTRAVENOUS at 04:11

## 2022-11-20 RX ADMIN — VANCOMYCIN HYDROCHLORIDE SCH MG: 125 CAPSULE ORAL at 17:21

## 2022-11-20 RX ADMIN — ASPIRIN 81 MG SCH MG: 81 TABLET ORAL at 17:22

## 2022-11-20 RX ADMIN — QUETIAPINE SCH MG: 25 TABLET, FILM COATED ORAL at 21:31

## 2022-11-20 RX ADMIN — ATORVASTATIN CALCIUM SCH MG: 40 TABLET, FILM COATED ORAL at 21:31

## 2022-11-20 RX ADMIN — SODIUM CHLORIDE PRN MLS/HR: 9 INJECTION, SOLUTION INTRAVENOUS at 17:23

## 2022-11-20 RX ADMIN — Medication SCH EACH: at 12:02

## 2022-11-20 RX ADMIN — VANCOMYCIN HYDROCHLORIDE SCH MG: 125 CAPSULE ORAL at 05:20

## 2022-11-20 NOTE — NUR
MS RN OPENING NOTE



RECEIVED PATIENT IN BED, AWAKE. PATIENT IS A/O X 1, CONFUSED, PATIENT IS LEGALLY BLIND, 
PATIENT IS WEARING MITTENS ON THE RIGHT HAND D/T HER PULLING HER IV LINES AND TUBES. WITH 
LEFT UA MIDLINE G#18 RUNNING NS AT 80 ML/HR PATENT. WITH G-TUBE ONGOING GLUCERNA 1.2 45 
ML/HR, TOLERATING WELL. NO RESIDUAL SEEN. GT PATENT AND INTACT, IN APPROPRIATE PLACE. 
PATIENT NOT IN ANY APPARENT DISTRESS. SAFETY MEASURES IN PLACE: BED LOCKED AND IN LOWEST 
POSITION, CALL LIGHT WITHIN REACH, SIDE RAILS UP. WILL CONTINUE TO MONITOR DURING MY SHIFT.

## 2022-11-20 NOTE — NUR
MS RN CLOSING NOTE



PATIENT IN BED, AWAKE. PATIENT IS A/O X 1, CONFUSED, WEARING MITTENS ON THE RIGHT HAND D/T 
HER PULLING HER IV LINES AND TUBES. LET PATIENT OFF MITTENS Q2H FOR CIRCULATION AND SAFETY, 
WITH RUKHSANA MIDLINE G#18 DRAINING NS AT 80 ML/HR PATENT, FLUSHING WELL.  PATIENT DOES NOT 
REPORT ANY PAIN AT THIS TIME. WITH GT WITH ONGOING GLUCERNA 1.2 45 ML/HR, TOLERATING WELL. 
NO RESIDUAL SEEN. GT PATENT AND INTACT, IN APPROPRIATE PLACE. PATIENT NOT IN ANY APPARENT 
DISTRESS. ALL DUE MEDS GIVEN, ALL NEEDS MET, WOUND CARE PROVIDED. SAFETY MEASURES IN PLACE: 
BED LOCKED AND IN LOWEST POSITION, CALL LIGHT WITHIN REACH, SIDE RAILS UP. WILL CONTINUE TO 
MONITOR DURING MY SHIFT.

-------------------------------------------------------------------------------

Addendum: 11/20/22 at 1852 by ISAAK MCLAIN RN

-------------------------------------------------------------------------------

WILL ENDORSE TO THE NIGHT SHIFT NURSE

## 2022-11-20 NOTE — NUR
MS RN OPENING NOTE



RECEIVED PATIENT IN BED, AWAKE. PATIENT IS A/O X 1, CONFUSED, PATIENT IS LEGALLY BLIND, 
PATIENT IS WEARING MITTENS ON THE RIGHT HAND D/T HER PULLING HER IV LINES AND TUBES. WITH 
RUKHSANA MIDLINE G#18 DRAINING NS AT 80 ML/HR PATENT, FLUSHING WELL.  PATIENT DOES NOT REPORT ANY 
PAIN AT THIS TIME. WITH GT WITH ONGOING GLUCERNA 1.2 45 ML/HR, TOLERATING WELL. NO RESIDUAL 
SEEN. GT PATENT AND INTACT, IN APPROPRIATE PLACE. PATIENT NOT IN ANY APPARENT DISTRESS. 
SAFETY MEASURES IN PLACE: BED LOCKED AND IN LOWEST POSITION, CALL LIGHT WITHIN REACH, SIDE 
RAILS UP. WILL CONTINUE TO MONITOR DURING MY SHIFT.

## 2022-11-20 NOTE — NUR
MS RN CLOSING NOTE



PT AWAKE IN BED. A/O X1 TO NAME ONLY. PT STABLE ON ROOM AIR. NO SOB OR S/S OF RESPIRATORY 
DISTRESS. BREATHING EVEN AND UNLABORED. IV ACCESS RUKHSANA ML 18G SL, INTACT AND PATENT, RUNNING 
NS @ 80 ML/HR. G TUBE IN PLACE, INTACT AND PATENT, RUNNING GLUCERNA 1.2 @ 45 ML/HR, NO 
RESIDUAL NOTED. WITH MITTEN RESTRAINTS, CIRCULATION CHECKED AND WNL DURING ENTIRE SHIFT. 
KEPT CLEAN AND DRY. WOUND CARE RENDERED. SAFETY PRECAUTIONS IN PLACE AT ALL TIMES. BED IN 
LOWEST LOCKED POSITION, HOB ELEVATED, SIDE RAILS UP X3, AND CALL LIGHT AND TABLE WITHIN 
REACH. ALL NEEDS MET AT THIS TIME AND WILL ENDORSE TO ONCOMING NURSE FOR CRYSTAL.

## 2022-11-21 VITALS — DIASTOLIC BLOOD PRESSURE: 52 MMHG | SYSTOLIC BLOOD PRESSURE: 131 MMHG

## 2022-11-21 VITALS — SYSTOLIC BLOOD PRESSURE: 166 MMHG | DIASTOLIC BLOOD PRESSURE: 89 MMHG

## 2022-11-21 VITALS — SYSTOLIC BLOOD PRESSURE: 128 MMHG | DIASTOLIC BLOOD PRESSURE: 64 MMHG

## 2022-11-21 RX ADMIN — VANCOMYCIN HYDROCHLORIDE SCH MG: 125 CAPSULE ORAL at 05:28

## 2022-11-21 RX ADMIN — Medication SCH EACH: at 12:30

## 2022-11-21 RX ADMIN — AMLODIPINE BESYLATE SCH MG: 5 TABLET ORAL at 09:03

## 2022-11-21 RX ADMIN — Medication PRN ML: at 18:37

## 2022-11-21 RX ADMIN — Medication SCH EACH: at 05:28

## 2022-11-21 RX ADMIN — VANCOMYCIN HYDROCHLORIDE SCH MG: 125 CAPSULE ORAL at 00:05

## 2022-11-21 RX ADMIN — Medication SCH OZ: at 09:06

## 2022-11-21 RX ADMIN — ASPIRIN 81 MG SCH MG: 81 TABLET ORAL at 17:28

## 2022-11-21 RX ADMIN — LOSARTAN POTASSIUM SCH MG: 50 TABLET, FILM COATED ORAL at 09:04

## 2022-11-21 RX ADMIN — SODIUM CHLORIDE PRN MLS/HR: 9 INJECTION, SOLUTION INTRAVENOUS at 05:18

## 2022-11-21 RX ADMIN — ATORVASTATIN CALCIUM SCH MG: 40 TABLET, FILM COATED ORAL at 21:30

## 2022-11-21 RX ADMIN — MUPIROCIN SCH APPLIC: 20 OINTMENT TOPICAL at 13:20

## 2022-11-21 RX ADMIN — TRAMADOL HYDROCHLORIDE SCH MG: 50 TABLET, FILM COATED ORAL at 09:04

## 2022-11-21 RX ADMIN — ACETAMINOPHEN SCH MG: 160 SOLUTION ORAL at 09:03

## 2022-11-21 RX ADMIN — TRAMADOL HYDROCHLORIDE SCH MG: 50 TABLET, FILM COATED ORAL at 16:30

## 2022-11-21 RX ADMIN — LOSARTAN POTASSIUM SCH MG: 50 TABLET, FILM COATED ORAL at 16:30

## 2022-11-21 RX ADMIN — Medication SCH EACH: at 00:06

## 2022-11-21 RX ADMIN — PANTOPRAZOLE SODIUM SCH MG: 40 GRANULE, DELAYED RELEASE ORAL at 09:03

## 2022-11-21 RX ADMIN — DIVALPROEX SODIUM SCH MG: 125 CAPSULE ORAL at 16:29

## 2022-11-21 RX ADMIN — QUETIAPINE SCH MG: 25 TABLET, FILM COATED ORAL at 21:31

## 2022-11-21 RX ADMIN — QUETIAPINE SCH MG: 25 TABLET, FILM COATED ORAL at 12:39

## 2022-11-21 RX ADMIN — MUPIROCIN SCH APPLIC: 20 OINTMENT TOPICAL at 00:06

## 2022-11-21 RX ADMIN — DIVALPROEX SODIUM SCH MG: 125 CAPSULE ORAL at 09:03

## 2022-11-21 RX ADMIN — Medication SCH EACH: at 17:28

## 2022-11-21 NOTE — NUR
MS RN CLOSING NOTES

PATIENT IS ALERT AND ORIENTED TIMES 3 AND AWAKE IN BED.NO PAIN NOTED. NO SOB NOTED. NO 
DISTRESS NOTED. ABLE TO MAKE NEEDS KNOWN IN Thai LANGUAGE. IV ACCESS ON THE RUKHSANA MID LINE 
G# 18 INTACT AND FUSING NS AT 80 ML/HR. GTUBE PLACEMENT CHECKED. IN PLACE. NO RESIDUAL 
NOTED. ON GLUCERNA 1.2 AT 45 ML/HR. TOLERATING WELL.PATIENT IS LEGALLY BLIND. ALL DUE MEDS 
GIVEN AS ORDERED.ALL SAFETY MEASURES IN PLACE. BED ALARM ON. BED LOCKED IN THE LOWEST 
POSITION. CALL LIGHT AND TABLE IN EASY REACH. HEAD OF THE BED ELEVATED FOR ASPIRATION 
PRECAUTION. WILL ENDORSE FOR CRYSTAL.

## 2022-11-21 NOTE — NUR
MS RN OPENING NOTES

PATIENT IS ALERT AND ORIENTED TIMES 3 AND AWAKE IN BED.NO PAIN NOTED. NO SOB NOTED. NO 
DISTRESS NOTED. ABLE TO MAKE NEEDS KNOWN IN Croatian LANGUAGE. IV ACCESS ON THE RUKHSANA MID LINE 
G# 18 INTACT AND FUSING NS AT 80 ML/HR. GTUBE PLACEMENT CHECKED. IN PLACE. NO RESIDUAL 
NOTED. ON GLUCERNA 1.2 AT 45 ML/HR. TOLERATING WELL.PATIENT IS LEGALLY BLIND. ALL SAFETY 
MEASURES IN PLACE. BED ALARM ON. BED LOCKED IN THE LOWEST POSITION. CALL LIGHT AND TABLE IN 
EASY REACH. HEAD OF THE BED ELEVATED FOR ASPIRATION PRECAUTION. WILL CONTINUE TO MONITOR 
CLOSELY.

## 2022-11-21 NOTE — NUR
MS RN CLOSING NOTE



PATIENT IN BED, AWAKE. A/O X 1, CONFUSED, WEARING MITTENS ON THE RIGHT HAND D/T HER PULLING 
HER IV LINES AND TUBES. LET PATIENT OFF MITTENS Q2H FOR CIRCULATION AND SAFETY, WITH RUKHSANA 
MIDLINE G#18 DRAINING NS AT 80 ML/HR PATENT, FLUSHING WELL.  PATIENT DOES NOT REPORT ANY 
PAIN AT THIS TIME. WITH GT WITH ONGOING GLUCERNA 1.2 45 ML/HR, TOLERATING WELL. NO RESIDUAL 
SEEN. GT PATENT AND INTACT, IN APPROPRIATE PLACE. PATIENT NOT IN ANY APPARENT DISTRESS. ALL 
DUE MEDS GIVEN, ALL NEEDS MET, WOUND CARE PROVIDED. SAFETY MEASURES IN PLACE: BED LOCKED AND 
IN LOWEST POSITION, CALL LIGHT WITHIN REACH, SIDE RAILS UP. WILL ENDORSE TO DAYSHIFT NURSE.

## 2022-11-21 NOTE — NUR
MS YENY OPENING NOTES



PATIENT IS ALERT AND ORIENTED TIMES 3 AND AWAKE IN BED. NO PAIN NOTED. NO SOB NOTED. NO 
DISTRESS NOTED. ABLE TO MAKE NEEDS KNOWN IN Maori LANGUAGE. NO IV ACCESS, PATIENT PULLED 
OUT HER IV. SOFT RESTRAINTS MITTENS ON. MONITORED FOR SKIN AND CIRCULATION. G-TUBE PLACEMENT 
CHECKED. IN PLACE. NO RESIDUAL NOTED. ON GLUCERNA 1.2 AT 45 ML/HR. TOLERATING WELL.PATIENT 
IS LEGALLY BLIND. ALL SAFETY MEASURES IN PLACE. BED ALARM ON. BED LOCKED IN THE LOWEST 
POSITION. CALL LIGHT AND TABLE IN EASY REACH. HEAD OF THE BED ELEVATED FOR ASPIRATION 
PRECAUTION. WILL CONTINUE TO MONITOR CLOSELY.

-------------------------------------------------------------------------------

Addendum: 11/22/22 at 0039 by HARRISON WITT RN

-------------------------------------------------------------------------------

PATIENT HAS NO IV ACCESS- CHARGE NURSE MADE AWARE. WITH DISCHARGE ORDER.

## 2022-11-22 VITALS — SYSTOLIC BLOOD PRESSURE: 140 MMHG | DIASTOLIC BLOOD PRESSURE: 71 MMHG

## 2022-11-22 VITALS — SYSTOLIC BLOOD PRESSURE: 126 MMHG | DIASTOLIC BLOOD PRESSURE: 64 MMHG

## 2022-11-22 RX ADMIN — TRAMADOL HYDROCHLORIDE SCH MG: 50 TABLET, FILM COATED ORAL at 08:32

## 2022-11-22 RX ADMIN — PANTOPRAZOLE SODIUM SCH MG: 40 GRANULE, DELAYED RELEASE ORAL at 08:32

## 2022-11-22 RX ADMIN — AMLODIPINE BESYLATE SCH MG: 5 TABLET ORAL at 08:33

## 2022-11-22 RX ADMIN — DIVALPROEX SODIUM SCH MG: 125 CAPSULE ORAL at 08:32

## 2022-11-22 RX ADMIN — TRAMADOL HYDROCHLORIDE SCH MG: 50 TABLET, FILM COATED ORAL at 17:00

## 2022-11-22 RX ADMIN — LOSARTAN POTASSIUM SCH MG: 50 TABLET, FILM COATED ORAL at 08:33

## 2022-11-22 RX ADMIN — MUPIROCIN SCH APPLIC: 20 OINTMENT TOPICAL at 00:37

## 2022-11-22 RX ADMIN — Medication SCH EACH: at 18:00

## 2022-11-22 RX ADMIN — Medication SCH EACH: at 11:54

## 2022-11-22 RX ADMIN — Medication SCH EACH: at 00:11

## 2022-11-22 RX ADMIN — LOSARTAN POTASSIUM SCH MG: 50 TABLET, FILM COATED ORAL at 16:59

## 2022-11-22 RX ADMIN — DIVALPROEX SODIUM SCH MG: 125 CAPSULE ORAL at 17:00

## 2022-11-22 RX ADMIN — ACETAMINOPHEN SCH MG: 160 SOLUTION ORAL at 08:32

## 2022-11-22 RX ADMIN — QUETIAPINE SCH MG: 25 TABLET, FILM COATED ORAL at 17:00

## 2022-11-22 RX ADMIN — Medication SCH EACH: at 06:19

## 2022-11-22 RX ADMIN — MUPIROCIN SCH APPLIC: 20 OINTMENT TOPICAL at 12:41

## 2022-11-22 RX ADMIN — Medication SCH OZ: at 08:34

## 2022-11-22 RX ADMIN — QUETIAPINE SCH MG: 25 TABLET, FILM COATED ORAL at 12:40

## 2022-11-22 NOTE — NUR
RN NOTES

DISCHARGE PATIENT IN STABLE CONDITION WITH STABLE VITAL SIGNS. NO PAIN NOTED. NO SOB NOTED. 
NO DISTRESS NOTED. G-TUBE SITE INTACT AND FLUSHING WELL. ALL THE BELONGING ACCOUNTED AND 
SIGNED FOR BY THE DAUGHTER REED. ID BAND REMOVED. PATIENT LEFT HOSPITAL WITH AMBULANCE AT 
1830 IN STABLE CONDITION WITH STABLE VITAL SIGNS. MD DR MARTINI AND CHARGE NURSE VALENTINA 
AWARE OF THE DISCHARGE.

## 2022-11-22 NOTE — NUR
MS RN OPENING NOTES

PATIENT IS ALERT AND ORIENTED TIMES 3 AND AWAKE IN BED.NO PAIN NOTED. NO SOB NOTED. NO 
DISTRESS NOTED. ABLE TO MAKE NEEDS KNOWN IN Jamaican LANGUAGE. NI IV ACCESS NOTED. PATIENT 
RERMOVED IV ACCESS PREVIOUS SHIFT. GTUBE PLACEMENT CHECKED. IN PLACE. NO RESIDUAL NOTED. ON 
GLUCERNA 1.2 AT 45 ML/HR. TOLERATING WELL.PATIENT IS LEGALLY BLIND. ALL SAFETY MEASURES IN 
PLACE. BED ALARM ON. BED LOCKED IN THE LOWEST POSITION. CALL LIGHT AND TABLE IN EASY REACH. 
HEAD OF THE BED ELEVATED FOR ASPIRATION PRECAUTION. WILL CONTINUE TO MONITOR CLOSELY.

## 2022-11-22 NOTE — NUR
MS RN CLOSING NOTES



PATIENT IS ALERT AND ORIENTED TIMES 3 AND AWAKE IN BED. NO PAIN NOTED. NO SOB NOTED. NO 
DISTRESS NOTED. ABLE TO MAKE NEEDS KNOWN IN Greenlandic LANGUAGE. NO IV ACCESS. G-TUBE 
PLACEMENT CHECKED. IN PLACE. NO RESIDUAL NOTED. ON GLUCERNA 1.2 AT 45 ML/HR. TOLERATING 
WELL.PATIENT IS LEGALLY BLIND. ALL DUE MEDS GIVEN AS ORDERED. ALL SAFETY MEASURES IN PLACE. 
BED ALARM ON. BED LOCKED IN THE LOWEST POSITION. CALL LIGHT AND TABLE IN EASY REACH. HEAD OF 
THE BED ELEVATED FOR ASPIRATION PRECAUTION. WILL ENDORSE FOR CRYSTAL.

## 2022-12-20 ENCOUNTER — HOSPITAL ENCOUNTER (INPATIENT)
Dept: HOSPITAL 54 - ER | Age: 77
LOS: 8 days | Discharge: NURSING, REHAB OR LONG-TERM CARE | DRG: 356 | End: 2022-12-28
Attending: NURSE PRACTITIONER | Admitting: NURSE PRACTITIONER
Payer: MEDICARE

## 2022-12-20 VITALS — HEIGHT: 61 IN | BODY MASS INDEX: 16.8 KG/M2 | WEIGHT: 89 LBS

## 2022-12-20 DIAGNOSIS — F01.518: ICD-10-CM

## 2022-12-20 DIAGNOSIS — L89.626: ICD-10-CM

## 2022-12-20 DIAGNOSIS — L89.896: ICD-10-CM

## 2022-12-20 DIAGNOSIS — K02.9: ICD-10-CM

## 2022-12-20 DIAGNOSIS — G89.29: ICD-10-CM

## 2022-12-20 DIAGNOSIS — F32.9: ICD-10-CM

## 2022-12-20 DIAGNOSIS — M24.561: ICD-10-CM

## 2022-12-20 DIAGNOSIS — K21.9: ICD-10-CM

## 2022-12-20 DIAGNOSIS — I10: ICD-10-CM

## 2022-12-20 DIAGNOSIS — G93.40: ICD-10-CM

## 2022-12-20 DIAGNOSIS — Z79.82: ICD-10-CM

## 2022-12-20 DIAGNOSIS — Z79.4: ICD-10-CM

## 2022-12-20 DIAGNOSIS — K29.70: ICD-10-CM

## 2022-12-20 DIAGNOSIS — M27.69: ICD-10-CM
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
## 2022-12-20 DIAGNOSIS — M24.562: ICD-10-CM
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
## 2022-12-20 DIAGNOSIS — R53.2: ICD-10-CM

## 2022-12-20 DIAGNOSIS — L89.123: ICD-10-CM

## 2022-12-20 DIAGNOSIS — J44.9: ICD-10-CM

## 2022-12-20 DIAGNOSIS — R64: ICD-10-CM

## 2022-12-20 DIAGNOSIS — Z43.1: Primary | ICD-10-CM

## 2022-12-20 DIAGNOSIS — S40.821A: ICD-10-CM
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
## 2022-12-20 DIAGNOSIS — E11.9: ICD-10-CM

## 2022-12-20 DIAGNOSIS — E43: ICD-10-CM

## 2022-12-20 DIAGNOSIS — R13.10: ICD-10-CM

## 2022-12-20 DIAGNOSIS — Z20.822: ICD-10-CM

## 2022-12-20 DIAGNOSIS — E78.5: ICD-10-CM

## 2022-12-20 DIAGNOSIS — M27.62: ICD-10-CM

## 2022-12-20 DIAGNOSIS — Z87.891: ICD-10-CM

## 2022-12-20 DIAGNOSIS — I69.354: ICD-10-CM

## 2022-12-20 PROCEDURE — A6403 STERILE GAUZE>16 <= 48 SQ IN: HCPCS

## 2022-12-20 PROCEDURE — C9803 HOPD COVID-19 SPEC COLLECT: HCPCS

## 2022-12-20 PROCEDURE — G0378 HOSPITAL OBSERVATION PER HR: HCPCS

## 2022-12-21 VITALS — SYSTOLIC BLOOD PRESSURE: 151 MMHG | DIASTOLIC BLOOD PRESSURE: 72 MMHG

## 2022-12-21 VITALS — DIASTOLIC BLOOD PRESSURE: 76 MMHG | SYSTOLIC BLOOD PRESSURE: 138 MMHG

## 2022-12-21 VITALS — SYSTOLIC BLOOD PRESSURE: 124 MMHG | DIASTOLIC BLOOD PRESSURE: 61 MMHG

## 2022-12-21 LAB
BASOPHILS # BLD AUTO: 0.1 K/UL (ref 0–0.2)
BASOPHILS NFR BLD AUTO: 0.7 % (ref 0–2)
BUN SERPL-MCNC: 25 MG/DL (ref 7–18)
CALCIUM SERPL-MCNC: 9.6 MG/DL (ref 8.5–10.1)
CHLORIDE SERPL-SCNC: 105 MMOL/L (ref 98–107)
CO2 SERPL-SCNC: 25 MMOL/L (ref 21–32)
CREAT SERPL-MCNC: 0.6 MG/DL (ref 0.6–1.3)
EOSINOPHIL NFR BLD AUTO: 3.9 % (ref 0–6)
GLUCOSE SERPL-MCNC: 105 MG/DL (ref 74–106)
HCT VFR BLD AUTO: 45 % (ref 33–45)
HGB BLD-MCNC: 14.3 G/DL (ref 11.5–14.8)
LYMPHOCYTES NFR BLD AUTO: 2.2 K/UL (ref 0.8–4.8)
LYMPHOCYTES NFR BLD AUTO: 23.5 % (ref 20–44)
MCHC RBC AUTO-ENTMCNC: 32 G/DL (ref 31–36)
MCV RBC AUTO: 86 FL (ref 82–100)
MONOCYTES NFR BLD AUTO: 0.8 K/UL (ref 0.1–1.3)
MONOCYTES NFR BLD AUTO: 8.7 % (ref 2–12)
NEUTROPHILS # BLD AUTO: 5.9 K/UL (ref 1.8–8.9)
NEUTROPHILS NFR BLD AUTO: 63.2 % (ref 43–81)
PLATELET # BLD AUTO: 210 K/UL (ref 150–450)
POTASSIUM SERPL-SCNC: 3.9 MMOL/L (ref 3.5–5.1)
RBC # BLD AUTO: 5.19 MIL/UL (ref 4–5.2)
SODIUM SERPL-SCNC: 138 MMOL/L (ref 136–145)
WBC NRBC COR # BLD AUTO: 9.3 K/UL (ref 4.3–11)

## 2022-12-21 PROCEDURE — 0DH63UZ INSERTION OF FEEDING DEVICE INTO STOMACH, PERCUTANEOUS APPROACH: ICD-10-PCS | Performed by: INTERNAL MEDICINE

## 2022-12-21 RX ADMIN — Medication SCH EACH: at 12:00

## 2022-12-21 RX ADMIN — Medication SCH EACH: at 18:32

## 2022-12-21 RX ADMIN — SODIUM CHLORIDE PRN MLS/HR: 9 INJECTION, SOLUTION INTRAVENOUS at 05:13

## 2022-12-21 RX ADMIN — Medication SCH EACH: at 07:04

## 2022-12-21 RX ADMIN — INSULIN HUMAN PRN UNITS: 100 INJECTION, SOLUTION PARENTERAL at 19:17

## 2022-12-21 RX ADMIN — Medication SCH EACH: at 23:55

## 2022-12-21 RX ADMIN — Medication SCH OZ: at 10:00

## 2022-12-21 RX ADMIN — Medication PRN ML: at 22:22

## 2022-12-21 RX ADMIN — INSULIN HUMAN PRN UNITS: 100 INJECTION, SOLUTION PARENTERAL at 07:05

## 2022-12-21 NOTE — NUR
BIBS THIS 78YO FEMALE FROM [facility]. CAME WITH CC OF PULLED OUT GTUBE. 
PATIENT WAS PLACED COMFORTABLY IN BED. PATIENT CAME WITH DRESSING APPLIED TO 
THE STOMA. WITH RIGHT HAND SOFT MITTENS. AAOX3. VITALS CHECKED.

## 2022-12-21 NOTE — NUR
RN NOTE



PT /78 AND HR 98. ADMINISTERED HYDRALAZINE 10 MG FOR SBP > 160 AS ORDERED. MADE 
COMFORTABLE IN BED. ALL NEEDS MET AT THIS TIME.

## 2022-12-21 NOTE — NUR
MS RN OPENING NOTE 



PATIENT AWAKE IN BED, ALERT/ORIENTED X 2, PATIENT LEGALLY BLIND. PATIENT STABLE ON RA, NO 
S/S OF DISTRESS OR SOB NOTED, BREATHING EVEN AND UNLABORED. IV ACCESS ON RIGHT FOREARM #20G 
INTACT AND INFUSING NS @ 75 ML/HR. PATIENT KEPT NPO, HERE FOR GTUBE DISLODGEMENT. PATIENT 
NOTED WITH LEFT HEEL WOUND, LEFT TOE WOUND, BACK WOUND, RASH ON LEFT ARM, AND SCATTERED 
SCRATCH MARKS ON BODY, WOUND CARE CONSULT ORDERED. PATIENT WITH RIGHT HAND MITTEN, PATIENT 
UNABLE TO MOVE LEFT SIDE OF BODY. BLOOD SUGAR WNL THIS AM. SAFETY MEASURES IN PLACE: CALL 
LIGHT WITHIN REACH, SIDE RAILS UP X 2, BED LOCKED IN LOWEST POSITION, HOB ELEVATED, BED 
ALARM ON. WILL ENDORSE TO DAYSHIFT RN FOR CONTINUITY OF CARE

## 2022-12-21 NOTE — NUR
WOUND CARE CONSULT: PT PRESENTS WITH BACK WOUND, STAGE 3 WITH SURROUNDING DEEP TISSUE INJURY 
AS WELL AS DRY WOUNDS TO HEELS AND TOES, PRESENT ON ADMISSION. PT REFUSED ASSESSMENT OF 
FEET. DR CORDERO AND DR HORNE CALLED FOR SURGICAL AND DPM CONSULTS. PT IS 
INCONTINENT. DISCUSSED SKIN PROTECTION WITH NURSING STAFF. MD IN AGREEMENT WITH PLAN OF 
CARE.

## 2022-12-21 NOTE — NUR
MS RN OPENING NOTE



RECEIVED PT AWAKE IN BED. A/O X2, EPISODES OF CONFUSION, AND ABLE TO MAKE NEEDS KNOWN. PT 
STABLE ON ROOM AIR. NO SOB OR S/S OF RESPIRATORY DISTRESS. BREATHING EVEN AND UNLABORED. IV 
ACCESS RFA 20G, INTACT AND PATENT, RUNNING NS @ 75 ML/HR. WITH GTUBE RUNNING JEVITY 1.3 @ 60 
ML/HR. WITH R MITTEN, RELEASED AND CIRCULATION CHECKED. SAFETY PRECAUTIONS IN PLACE. BED IN 
LOWEST LOCKED POSITION, HOB ELEVATED, SIDE RAILS UP X3, AND CALL LIGHT AND TABLE WITHIN 
REACH. ALL NEEDS MET AT THIS TIME.

## 2022-12-22 VITALS — DIASTOLIC BLOOD PRESSURE: 75 MMHG | SYSTOLIC BLOOD PRESSURE: 140 MMHG

## 2022-12-22 VITALS — SYSTOLIC BLOOD PRESSURE: 131 MMHG | DIASTOLIC BLOOD PRESSURE: 94 MMHG

## 2022-12-22 VITALS — SYSTOLIC BLOOD PRESSURE: 141 MMHG | DIASTOLIC BLOOD PRESSURE: 77 MMHG

## 2022-12-22 LAB
BASOPHILS # BLD AUTO: 0.1 K/UL (ref 0–0.2)
BASOPHILS NFR BLD AUTO: 0.5 % (ref 0–2)
BUN SERPL-MCNC: 21 MG/DL (ref 7–18)
CALCIUM SERPL-MCNC: 9.9 MG/DL (ref 8.5–10.1)
CHLORIDE SERPL-SCNC: 111 MMOL/L (ref 98–107)
CO2 SERPL-SCNC: 25 MMOL/L (ref 21–32)
CREAT SERPL-MCNC: 0.6 MG/DL (ref 0.6–1.3)
EOSINOPHIL NFR BLD AUTO: 1.5 % (ref 0–6)
GLUCOSE SERPL-MCNC: 126 MG/DL (ref 74–106)
HCT VFR BLD AUTO: 42 % (ref 33–45)
HGB BLD-MCNC: 13.2 G/DL (ref 11.5–14.8)
LYMPHOCYTES NFR BLD AUTO: 1.8 K/UL (ref 0.8–4.8)
LYMPHOCYTES NFR BLD AUTO: 15.3 % (ref 20–44)
MAGNESIUM SERPL-MCNC: 1.9 MG/DL (ref 1.8–2.4)
MCHC RBC AUTO-ENTMCNC: 32 G/DL (ref 31–36)
MCV RBC AUTO: 85 FL (ref 82–100)
MONOCYTES NFR BLD AUTO: 0.9 K/UL (ref 0.1–1.3)
MONOCYTES NFR BLD AUTO: 8 % (ref 2–12)
NEUTROPHILS # BLD AUTO: 8.8 K/UL (ref 1.8–8.9)
NEUTROPHILS NFR BLD AUTO: 74.7 % (ref 43–81)
PHOSPHATE SERPL-MCNC: 3.3 MG/DL (ref 2.5–4.9)
PLATELET # BLD AUTO: 343 K/UL (ref 150–450)
POTASSIUM SERPL-SCNC: 3.2 MMOL/L (ref 3.5–5.1)
RBC # BLD AUTO: 4.87 MIL/UL (ref 4–5.2)
SODIUM SERPL-SCNC: 146 MMOL/L (ref 136–145)
WBC NRBC COR # BLD AUTO: 11.7 K/UL (ref 4.3–11)

## 2022-12-22 RX ADMIN — OXYCODONE HYDROCHLORIDE AND ACETAMINOPHEN SCH MG: 500 TABLET ORAL at 17:24

## 2022-12-22 RX ADMIN — LOSARTAN POTASSIUM SCH MG: 50 TABLET, FILM COATED ORAL at 16:13

## 2022-12-22 RX ADMIN — INSULIN HUMAN PRN UNITS: 100 INJECTION, SOLUTION PARENTERAL at 16:44

## 2022-12-22 RX ADMIN — ATORVASTATIN CALCIUM SCH MG: 40 TABLET, FILM COATED ORAL at 21:11

## 2022-12-22 RX ADMIN — Medication SCH OZ: at 08:44

## 2022-12-22 RX ADMIN — TRAMADOL HYDROCHLORIDE SCH MG: 50 TABLET, FILM COATED ORAL at 16:13

## 2022-12-22 RX ADMIN — Medication SCH EACH: at 05:16

## 2022-12-22 RX ADMIN — INSULIN HUMAN PRN UNITS: 100 INJECTION, SOLUTION PARENTERAL at 11:22

## 2022-12-22 RX ADMIN — Medication SCH EACH: at 17:24

## 2022-12-22 RX ADMIN — ASPIRIN 81 MG SCH MG: 81 TABLET ORAL at 17:24

## 2022-12-22 RX ADMIN — QUETIAPINE SCH MG: 25 TABLET, FILM COATED ORAL at 21:11

## 2022-12-22 RX ADMIN — Medication SCH EACH: at 11:20

## 2022-12-22 NOTE — NUR
MS RN CLOSING NOTE



PT RESTING IN BED, VERBALLY RESPONSIVE. A/O X2, EPISODES OF CONFUSION, AND ABLE TO MAKE 
NEEDS KNOWN. PT STABLE ON ROOM AIR. NO SOB OR S/S OF RESPIRATORY DISTRESS. BREATHING EVEN 
AND UNLABORED. IV ACCESS RFA 20G, INTACT AND PATENT, RUNNING NS @ 75 ML/HR. WITH GTUBE 
RUNNING GLUCERNA 1.2 @ 60 ML/HR. WITH R BRODYTEN, RELEASED AND CIRCULATION CHECKED Q2H. TURNED 
AND REPOSITIONED Q2H. KEPT CLEAN AND DRY. ALL DUE MEDS GIVEN AS ORDERED. SAFETY PRECAUTIONS 
IN PLACE AT ALL TIMES. BED IN LOWEST LOCKED POSITION, HOB ELEVATED, SIDE RAILS UP X3, AND 
CALL LIGHT AND TABLE WITHIN REACH. ALL NEEDS MET AT THIS TIME AND WILL ENDORSE TO ONCOMING 
NURSE FOR CRYSTAL.

## 2022-12-22 NOTE — NUR
MS RN OPENING NOTE



PT RESTING IN BED, VERBALLY RESPONSIVE. A/O X2, EPISODES OF CONFUSION. ON RA, TOLERATING 
WELL. NO SOB OR S/S OF RESPIRATORY DISTRESS. BREATHING EVEN AND UNLABORED. IV ACCESS RFA 
20G, INTACT AND PATENT, RUNNING NS @ 75 ML/HR. WITH GTUBE RUNNING GLUCERNA 1.2 @ 60 ML/HR. 
WITH R MITTEN, RELEASED AND CIRCULATION CHECKED Q2H. TURNED AND REPOSITIONED Q2H. KEPT CLEAN 
AND DRY. SAFETY PRECAUTIONS IN PLACE AT ALL TIMES. BED IN LOWEST LOCKED POSITION, HOB 
ELEVATED, SIDE RAILS UP X3, AND CALL LIGHT AND TABLE WITHIN REACH. WILL CONTINUE TO MONITOR.

## 2022-12-22 NOTE — NUR
MS RN OPENING NOTE



RECEIVED PATIENT RESTING IN BED, VERBALLY RESPONSIVE. A/O X2, EPISODES OF CONFUSION. ON RA, 
TOLERATING WELL. NO SOB OR S/S OF RESPIRATORY DISTRESS. BREATHING EVEN AND UNLABORED. IV 
ACCESS RFA 20G, INTACT AND PATENT, RIGHT HAND #20, RUNNING NS @ 75 ML/HR. WITH G-TUBE 
RUNNING GLUCERNA 1.2 @ 60 ML/HR. WITH R MITTEN, RELEASED AND CIRCULATION CHECKED Q2H. TURNED 
AND REPOSITIONED Q2H. KEPT CLEAN AND DRY. SAFETY PRECAUTIONS IN PLACE AT ALL TIMES. BED IN 
LOWEST LOCKED POSITION, HOB ELEVATED, SIDE RAILS UP X3, AND CALL LIGHT AND TABLE WITHIN 
REACH. WILL CONTINUE TO MONITOR.

## 2022-12-22 NOTE — NUR
MS RN CLOSING NOTE



PT AWAKE IN BED, VERBALLY RESPONSIVE. A/O X2, EPISODES OF CONFUSION, AND ABLE TO MAKE NEEDS 
KNOWN. PT STABLE ON ROOM AIR. NO SOB OR S/S OF RESPIRATORY DISTRESS. BREATHING EVEN AND 
UNLABORED. IV ACCESS RFA 20G, INTACT AND PATENT, RUNNING NS @ 75 ML/HR. WITH GTUBE RUNNING 
GLUCERNA 1.2 @ 60 ML/HR. WITH R MITTEN, RELEASED AND CIRCULATION CHECKED Q2H. TURNED AND 
REPOSITIONED Q2H. KEPT CLEAN AND DRY. ALL DUE MEDS GIVEN AS ORDERED. WOUND CARE DONE. SAFETY 
PRECAUTIONS IN PLACE AT ALL TIMES. BED IN LOWEST LOCKED POSITION, HOB ELEVATED, SIDE RAILS 
UP X3, AND CALL LIGHT AND TABLE WITHIN REACH. ALL NEEDS MET AT THIS TIME AND WILL ENDORSE TO 
ONCOMING NURSE FOR CRYSTAL.

## 2022-12-23 VITALS — SYSTOLIC BLOOD PRESSURE: 144 MMHG | DIASTOLIC BLOOD PRESSURE: 95 MMHG

## 2022-12-23 VITALS — SYSTOLIC BLOOD PRESSURE: 119 MMHG | DIASTOLIC BLOOD PRESSURE: 61 MMHG

## 2022-12-23 VITALS — SYSTOLIC BLOOD PRESSURE: 104 MMHG | DIASTOLIC BLOOD PRESSURE: 48 MMHG

## 2022-12-23 VITALS — DIASTOLIC BLOOD PRESSURE: 64 MMHG | SYSTOLIC BLOOD PRESSURE: 106 MMHG

## 2022-12-23 LAB
BUN SERPL-MCNC: 23 MG/DL (ref 7–18)
CALCIUM SERPL-MCNC: 9.3 MG/DL (ref 8.5–10.1)
CHLORIDE SERPL-SCNC: 116 MMOL/L (ref 98–107)
CO2 SERPL-SCNC: 21 MMOL/L (ref 21–32)
CREAT SERPL-MCNC: 0.7 MG/DL (ref 0.6–1.3)
GLUCOSE SERPL-MCNC: 144 MG/DL (ref 74–106)
POTASSIUM SERPL-SCNC: 3.7 MMOL/L (ref 3.5–5.1)
SODIUM SERPL-SCNC: 147 MMOL/L (ref 136–145)

## 2022-12-23 RX ADMIN — Medication SCH OZ: at 05:54

## 2022-12-23 RX ADMIN — OXYCODONE HYDROCHLORIDE AND ACETAMINOPHEN SCH MG: 500 TABLET ORAL at 17:02

## 2022-12-23 RX ADMIN — ACETAMINOPHEN SCH MG: 160 SOLUTION ORAL at 08:54

## 2022-12-23 RX ADMIN — ASPIRIN 81 MG SCH MG: 81 TABLET ORAL at 17:02

## 2022-12-23 RX ADMIN — Medication SCH EACH: at 11:49

## 2022-12-23 RX ADMIN — QUETIAPINE SCH MG: 25 TABLET, FILM COATED ORAL at 21:24

## 2022-12-23 RX ADMIN — TRAMADOL HYDROCHLORIDE SCH MG: 50 TABLET, FILM COATED ORAL at 08:57

## 2022-12-23 RX ADMIN — Medication SCH EACH: at 17:17

## 2022-12-23 RX ADMIN — LOSARTAN POTASSIUM SCH MG: 50 TABLET, FILM COATED ORAL at 16:58

## 2022-12-23 RX ADMIN — INSULIN HUMAN PRN UNITS: 100 INJECTION, SOLUTION PARENTERAL at 17:17

## 2022-12-23 RX ADMIN — THERA TABS SCH UDTAB: TAB at 08:54

## 2022-12-23 RX ADMIN — TRAMADOL HYDROCHLORIDE SCH MG: 50 TABLET, FILM COATED ORAL at 17:02

## 2022-12-23 RX ADMIN — Medication SCH MG: at 08:54

## 2022-12-23 RX ADMIN — INSULIN HUMAN PRN UNITS: 100 INJECTION, SOLUTION PARENTERAL at 06:25

## 2022-12-23 RX ADMIN — Medication SCH APPLIC: at 09:59

## 2022-12-23 RX ADMIN — Medication SCH OZ: at 09:59

## 2022-12-23 RX ADMIN — INSULIN HUMAN PRN UNITS: 100 INJECTION, SOLUTION PARENTERAL at 11:49

## 2022-12-23 RX ADMIN — AMLODIPINE BESYLATE SCH MG: 5 TABLET ORAL at 08:55

## 2022-12-23 RX ADMIN — Medication SCH EACH: at 00:00

## 2022-12-23 RX ADMIN — LOSARTAN POTASSIUM SCH MG: 50 TABLET, FILM COATED ORAL at 08:57

## 2022-12-23 RX ADMIN — ATORVASTATIN CALCIUM SCH MG: 40 TABLET, FILM COATED ORAL at 21:25

## 2022-12-23 RX ADMIN — SODIUM CHLORIDE PRN MLS/HR: 9 INJECTION, SOLUTION INTRAVENOUS at 00:19

## 2022-12-23 RX ADMIN — DIVALPROEX SODIUM SCH MG: 250 TABLET, DELAYED RELEASE ORAL at 08:54

## 2022-12-23 RX ADMIN — Medication SCH EACH: at 06:16

## 2022-12-23 RX ADMIN — Medication PRN ML: at 00:11

## 2022-12-23 NOTE — NUR
WOUND CARE CONSULT: PT SEEN FOR RT ELBOW INTACT BLISTER. PT NOTED TO BE MOVING HER ARMS AND 
LEGS  IN THE BED FREQUENTLY. DISCUSSED INTACT BLISTER WITH SURGICAL P.A. CURRENTLY ON CASE. 
SKIN PROTECTION DISCUSSED WITH NURSING STAFF. MD IN AGREEMENT WITH PLAN OF CARE.

## 2022-12-23 NOTE — NUR
RN NOTES

IV ACCESS INFILTRATION NOTED AT RIGHT FOREARM. EDEMA NOTED ABOVE THE IV SITE. APPLIED ICE 
PACK. RAISED ARM WITH PILLOW. IV REMOVED. TOLERATED PROC WELL. WILL CONT TO MONITOR.

-------------------------------------------------------------------------------

Addendum: 12/23/22 at 0627 by HARRISON WITT RN

-------------------------------------------------------------------------------

ADD: BLISTERS NOTED JUST ABOVE THE ELBOW WITH ACCOMPANYING EDEMA ABOVE THE IV SITE. PLACED 
ICE PACK. TOLERATED WELL. WILL CONT TO MONITOR.

## 2022-12-23 NOTE — NUR
RN OPENING NOTE



PATIENT AWAKE IN BED. A/OX1 (NAME). NO S/S OF DISTRESS, BREATHING WITHOUT DIFFICULTY ON ROOM 
AIR. R-HAND #20 INTACT AND PATENT W/ NS 75ML/HR. GLUCERNA 1.2 TF TO BE RESUMED AT 2200. TUBE 
INTACT W/ NO SIGNS OF DISLODGMENT. SAFETY MEASURES IN PLACE: BED LOCKED AND AT LOWEST 
POSITION, RAILS UP X2, CALL BELL WITHIN REACH. WILL CONTINUE TO MONITOR PATIENT.

## 2022-12-23 NOTE — NUR
MS RN CLOSING NOTES:



PT ASLEEP IN BED, EASILY ROUSED, A/O X1, NONVERBAL. PT ON O2 @ 4LPM WITH COOL AEROSOL VIA 
NC, TOLERATING WELL. NO SOB OR S/S OF RESPIRATORY DISTRESS. BREATHING EVEN AND UNLABORED. 
S/P RUKHSANA MIDLINE REINSERTION #18, RUNNING 1/2 NS @ 60ML/HR. IV ACCESS L FA #22, SL INTACT AND 
PATENT. G TUBE FEEDING RUNNING JEVITY 1.2 @ 70 ML/HR, TOLERATING WELL. R HAND MITTEN, NOTED 
CHECKED Q2H DURING SHIFT, CIRCULATION AND SKIN ASSESSED, NO ISSUES NOTED. ARCHULETA CATHETER 
DRAINED 400CC OF CLEAR, YELLOW URINE DURING SHIFT. KEPT PT CLEAN, DRY AND COMFORTABLE. WOUND 
CARE DONE AS ORDERED, DUE MEDS GIVEN. SAFETY PRECAUTIONS IN PLACE AT ALL TIMES. BED IN 
LOWEST LOCKED POSITION, HOB ELEVATED, SIDE RAILS UP X3, AND CALL LIGHT AND TABLE WITHIN 
REACH, WILL ENDORSE TO PM SHIFT.

## 2022-12-23 NOTE — NUR
MS RN CLOSING NOTE



RECEIVED PT AWAKE IN BED, VERBALLY RESPONSIVE. A/O X2, WITH PERIODS OF CONFUSION. PT ON RA, 
NO NO S/S OF SOB, BREATHING EVEN AND UNLABORED. IV ACCESS RIGHT HAND #20, INTACT AND PATENT. 
WITH G-TUBE RUNNING GLUCERNA 1.2 @ 60 ML/HR. R HAND MITTEN NOTED, CHECKED CIRCULATION AND 
SKIN AT RESTRAINT SITE, NO ISSUES NOTED.  EDEMA AT RIGHT ARM AND BLISTERS NOTED ABOVE TH 
ELBOW. SAFETY PRECAUTIONS IN PLACE AT ALL TIMES. BED IN LOWEST LOCKED POSITION, HOB 
ELEVATED, SIDE RAILS UP X3, CALL LIGHT AND TABLE WITHIN REACH; WILL CONT WITH PLAN OF CARE 
DURING SHIFT.


-------------------------------------------------------------------------------

Addendum: 12/23/22 at 0802 by ROSE MENDES RN

-------------------------------------------------------------------------------

MS RN OPENING NOTES:

## 2022-12-23 NOTE — NUR
MS RN CLOSING NOTES



PT ASLEEP IN BED, EASILY ROUSED, A/O X2, WITH PERIODS OF CONFUSION. PT ON RA, NO S/S OF SOB, 
BREATHING EVEN AND UNLABORED. IV ACCESS RIGHT HAND #20, INTACT AND PATENT.  G-TUBE RUNNING 
GLUCERNA 1.2 @ 60 ML/HR. R HAND MITTEN NOTED, CHECKED CIRCULATION AND SKIN AT RESTRAINT SITE 
Q 2 HR DURING SHIFT, NO ISSUES NOTED. ABD BINDER NOTED, CLEAN AND DRY, CHANGED G TUBE SITE 
DRESSING. EDEMA AT RIGHT ARM AND BLISTERS NOTED AT R ELBOW, PHOTO DOCUMENTED IN CHART, WOUND 
CONSULT DONE AND MD AWARE. PER CORINNE WOUND PA, CONT TO OBSERVE BLISTER, COVER WITH 
OPTIFORM ONCE DEFLATED. WOUND CARE DONE AS ORDERED, KEPT PT CLEAN, DRY AND COMFORTABLE, DUE 
MEDS GIVEN. SAFETY PRECAUTIONS IN PLACE AT ALL TIMES. BED IN LOWEST LOCKED POSITION, HOB 
ELEVATED, SIDE RAILS UP X3, CALL LIGHT AND TABLE WITHIN REACH; WILL ENDORSE TO PM SHIFT.

## 2022-12-24 VITALS — DIASTOLIC BLOOD PRESSURE: 59 MMHG | SYSTOLIC BLOOD PRESSURE: 108 MMHG

## 2022-12-24 RX ADMIN — Medication SCH APPLIC: at 08:46

## 2022-12-24 RX ADMIN — OXYCODONE HYDROCHLORIDE AND ACETAMINOPHEN SCH MG: 500 TABLET ORAL at 17:18

## 2022-12-24 RX ADMIN — Medication SCH EACH: at 06:04

## 2022-12-24 RX ADMIN — INSULIN HUMAN PRN UNITS: 100 INJECTION, SOLUTION PARENTERAL at 06:06

## 2022-12-24 RX ADMIN — Medication SCH MG: at 08:37

## 2022-12-24 RX ADMIN — Medication SCH EACH: at 00:40

## 2022-12-24 RX ADMIN — Medication SCH EACH: at 23:18

## 2022-12-24 RX ADMIN — ASPIRIN 81 MG SCH MG: 81 TABLET ORAL at 17:14

## 2022-12-24 RX ADMIN — SODIUM CHLORIDE PRN MLS/HR: 9 INJECTION, SOLUTION INTRAVENOUS at 05:17

## 2022-12-24 RX ADMIN — TRAMADOL HYDROCHLORIDE SCH MG: 50 TABLET, FILM COATED ORAL at 08:38

## 2022-12-24 RX ADMIN — Medication PRN ML: at 05:17

## 2022-12-24 RX ADMIN — DIVALPROEX SODIUM SCH MG: 250 TABLET, DELAYED RELEASE ORAL at 08:37

## 2022-12-24 RX ADMIN — Medication SCH OZ: at 08:45

## 2022-12-24 RX ADMIN — Medication SCH EACH: at 17:18

## 2022-12-24 RX ADMIN — AMLODIPINE BESYLATE SCH MG: 5 TABLET ORAL at 08:39

## 2022-12-24 RX ADMIN — QUETIAPINE SCH MG: 25 TABLET, FILM COATED ORAL at 21:04

## 2022-12-24 RX ADMIN — TRAMADOL HYDROCHLORIDE SCH MG: 50 TABLET, FILM COATED ORAL at 17:17

## 2022-12-24 RX ADMIN — INSULIN HUMAN PRN UNITS: 100 INJECTION, SOLUTION PARENTERAL at 17:21

## 2022-12-24 RX ADMIN — Medication SCH EACH: at 12:05

## 2022-12-24 RX ADMIN — INSULIN HUMAN PRN UNITS: 100 INJECTION, SOLUTION PARENTERAL at 17:20

## 2022-12-24 RX ADMIN — LOSARTAN POTASSIUM SCH MG: 50 TABLET, FILM COATED ORAL at 08:39

## 2022-12-24 RX ADMIN — ATORVASTATIN CALCIUM SCH MG: 40 TABLET, FILM COATED ORAL at 21:04

## 2022-12-24 RX ADMIN — SODIUM CHLORIDE PRN MLS/HR: 9 INJECTION, SOLUTION INTRAVENOUS at 17:53

## 2022-12-24 RX ADMIN — INSULIN HUMAN PRN UNITS: 100 INJECTION, SOLUTION PARENTERAL at 00:40

## 2022-12-24 RX ADMIN — LOSARTAN POTASSIUM SCH MG: 50 TABLET, FILM COATED ORAL at 17:17

## 2022-12-24 RX ADMIN — ACETAMINOPHEN SCH MG: 160 SOLUTION ORAL at 08:37

## 2022-12-24 RX ADMIN — THERA TABS SCH UDTAB: TAB at 08:38

## 2022-12-24 NOTE — NUR
RN NOTE



PATIENT ASLEEP IN BED. A/OX2(NAME, PLACE). NO S/S OF DISTRESS, BREATHING WITHOUT DIFFICULTY 
ON ROOM AIR. R-HAND #20 W/ NS 75ML/HR. GLUCERNA 1.2 @60ML/HR. SAFETY MEASURES IN PLACE: BED 
LOCKED AND AT LOWEST POSITION, RAILS UP X2, CALL BELL WITHIN REACH. WILL ENDORSE TO NEXT 
SHIFT FOR CRYSTAL.

## 2022-12-24 NOTE — NUR
MS  RN  CLOSING  NOTE



  PATIENT IN BED AWAKE  . A/OX1 (NAME).   ROOM AIR  AND  TOLERATED WELL , NO  SOB OR  
DISTRESS  NOTED  , NO S/S  OF  ANY P[AIN AND  DISCOMFORT   NOTED  .  ALL DUE MEDS  AS 
ORDERED  GIVEN  , R-HAND #20 INTACT AND PATENT W/ NS 75ML/HR. GLUCERNA 1.2 TF   ON  GOING  
60 ML / HOUR   . G- TUBE INTACT W/ NO SIGNS OF DISLODGMENT. SAFETY MEASURES IN PLACE: BED 
LOCKED AND AT LOWEST POSITION, SIDE RAILS UP X2, CALL BELL WITHIN REACH. ENDORSED  TO  NEXT  
SHIFT  .

## 2022-12-24 NOTE — NUR
MS  RN OPENING NOTE



RECEIVED   PATIENT IN BED AWAKE  . A/OX1 (NAME).   ROOM AIR  AND  TOLERATED WELL , NO  SOB 
OR  DISTRESS  NOTED  , NO S/S  OF  ANY P[AIN AND  DISCOMFORT   NOTED  . R-HAND #20 INTACT 
AND PATENT W/ NS 75ML/HR. GLUCERNA 1.2 TF   ON  GOING  60 ML / HOUR   . G- TUBE INTACT W/ NO 
SIGNS OF DISLODGMENT. SAFETY MEASURES IN PLACE: BED LOCKED AND AT LOWEST POSITION, SIDE 
RAILS UP X2, CALL BELL WITHIN REACH. WILL CONTINUE TO MONITOR PATIENT.

## 2022-12-24 NOTE — NUR
MS  RN  OPENING NOTE;



RECEIVED PATIENT IN BED AWAKE. A/OX1 CONFUSED,ROOM AIR  AND  TOLERATED WELL , NO  SIGN 
SOB/DISTRESS NOTED,NO SIGN OF PAIN/DISCOMFORT AT THIS TIME,R-HAND #20 INTACT AND PATENT W/ 
NS 75ML/HR. GLUCERNA 1.2 TF ON GOING  60 ML / HOUR ,CARLA WELL NO RESIDUAL NOTED,SAFETY 
MEASURES IN PLACE: BED LOCKED AND AT LOWEST POSITION, SIDE RAILS UP X2, CALL BELL WITHIN 
REACH.WILL CONTINUE TO MONITOR.

## 2022-12-25 VITALS — SYSTOLIC BLOOD PRESSURE: 132 MMHG | DIASTOLIC BLOOD PRESSURE: 63 MMHG

## 2022-12-25 VITALS — SYSTOLIC BLOOD PRESSURE: 131 MMHG | DIASTOLIC BLOOD PRESSURE: 61 MMHG

## 2022-12-25 VITALS — SYSTOLIC BLOOD PRESSURE: 124 MMHG | DIASTOLIC BLOOD PRESSURE: 58 MMHG

## 2022-12-25 RX ADMIN — AMLODIPINE BESYLATE SCH MG: 5 TABLET ORAL at 08:31

## 2022-12-25 RX ADMIN — Medication SCH APPLIC: at 08:35

## 2022-12-25 RX ADMIN — Medication SCH MG: at 08:32

## 2022-12-25 RX ADMIN — ASPIRIN 81 MG SCH MG: 81 TABLET ORAL at 17:20

## 2022-12-25 RX ADMIN — THERA TABS SCH UDTAB: TAB at 08:32

## 2022-12-25 RX ADMIN — TRAMADOL HYDROCHLORIDE SCH MG: 50 TABLET, FILM COATED ORAL at 08:33

## 2022-12-25 RX ADMIN — INSULIN HUMAN PRN UNITS: 100 INJECTION, SOLUTION PARENTERAL at 13:39

## 2022-12-25 RX ADMIN — OXYCODONE HYDROCHLORIDE AND ACETAMINOPHEN SCH MG: 500 TABLET ORAL at 17:20

## 2022-12-25 RX ADMIN — Medication SCH OZ: at 08:34

## 2022-12-25 RX ADMIN — Medication SCH EACH: at 05:37

## 2022-12-25 RX ADMIN — Medication SCH EACH: at 17:27

## 2022-12-25 RX ADMIN — ATORVASTATIN CALCIUM SCH MG: 40 TABLET, FILM COATED ORAL at 21:42

## 2022-12-25 RX ADMIN — SODIUM CHLORIDE PRN MLS/HR: 9 INJECTION, SOLUTION INTRAVENOUS at 14:55

## 2022-12-25 RX ADMIN — QUETIAPINE SCH MG: 25 TABLET, FILM COATED ORAL at 21:42

## 2022-12-25 RX ADMIN — LOSARTAN POTASSIUM SCH MG: 50 TABLET, FILM COATED ORAL at 16:12

## 2022-12-25 RX ADMIN — DIVALPROEX SODIUM SCH MG: 250 TABLET, DELAYED RELEASE ORAL at 08:32

## 2022-12-25 RX ADMIN — ACETAMINOPHEN SCH MG: 160 SOLUTION ORAL at 08:37

## 2022-12-25 RX ADMIN — Medication SCH EACH: at 23:41

## 2022-12-25 RX ADMIN — LOSARTAN POTASSIUM SCH MG: 50 TABLET, FILM COATED ORAL at 08:33

## 2022-12-25 RX ADMIN — TRAMADOL HYDROCHLORIDE SCH MG: 50 TABLET, FILM COATED ORAL at 16:10

## 2022-12-25 RX ADMIN — Medication SCH EACH: at 12:14

## 2022-12-25 NOTE — NUR
MS  RN  CLOSING NOTE;



PATIENT IN BED AWAKE. A/OX1 CONFUSED,ROOM AIR  AND  TOLERATED WELL , NO  SIGN SOB/DISTRESS 
NOTED,NO SIGN OF PAIN/DISCOMFORT AT THIS TIME,DUE MEDS GIVEN AS ORDER,ALL NEEDS ATTENDED,RFA 
22G INTACT AND PATENT W/ NS 75ML/HR. GLUCERNA 1.2 TF ON GOING  60 ML / HOUR ,CARLA WELL NO 
RESIDUAL NOTED,SAFETY MEASURES IN PLACE: BED LOCKED AND AT LOWEST POSITION, SIDE RAILS UP 
X2, CALL BELL WITHIN REACH.WILL ENDORSED TO NEXT SHIFT.

-------------------------------------------------------------------------------

Addendum: 12/25/22 at 0238 by JAYJAY HONEYCUTT RN

-------------------------------------------------------------------------------

WRONG .

## 2022-12-25 NOTE — NUR
MS  RN  CLOSING NOTE;



PATIENT IN BED AWAKE. A/OX1 CONFUSED,ROOM AIR  AND  TOLERATED WELL , NO  SIGN SOB/DISTRESS 
NOTED,NO SIGN OF PAIN/DISCOMFORT AT THIS TIME,DUE MEDS GIVEN AS ORDER,ALL NEEDS ATTENDED,RFA 
22G INTACT AND PATENT W/ NS 75ML/HR. GLUCERNA 1.2 TF ON GOING  60 ML / HOUR ,CARLA WELL NO 
RESIDUAL NOTED,SAFETY MEASURES IN PLACE: BED LOCKED AND AT LOWEST POSITION, SIDE RAILS UP 
X2, CALL BELL WITHIN REACH.WILL ENDORSED TO NEXT SHIFT.

## 2022-12-25 NOTE — NUR
MS  RN OPENING NOTE



RECEIVED   PATIENT IN BED AWAKE  . A/OX1 (NAME).   ROOM AIR  AND  TOLERATED WELL , NO  SOB 
OR  DISTRESS  NOTED  , NO S/S  OF  ANY PAIN AND  DISCOMFORT   NOTED  . R-HAND #22 INTACT AND 
PATENT W/ NS 75ML/HR. GLUCERNA 1.2 TF   ON  GOING  60 ML / HOUR   . G- TUBE INTACT W/ NO 
SIGNS OF DISLODGMENT. SAFETY MEASURES IN PLACE: BED LOCKED AND AT LOWEST POSITION, SIDE 
RAILS UP X2, CALL BELL WITHIN REACH. WILL CONTINUE TO MONITOR PATIENT.

## 2022-12-25 NOTE — NUR
MS  RN  OPENING NOTE;

RECEIVED PATIENT IN BED AWAKE. A/OX1 CONFUSED,ROOM AIR  AND  TOLERATED WELL , NO  SIGN 
SOB/DISTRESS NOTED,NO SIGN OF PAIN/DISCOMFORT AT THIS TIME,R-HAND #20 INTACT AND PATENT W/ 
NS 75ML/HR. GLUCERNA 1.2  ON GOING  60 ML / HOUR ,NO RESIDUAL NOTED, GTUBE INTACT. ALL 
SAFETY MEASURES IN PLACE: BED LOCKED AND AT LOWEST POSITION, SIDE RAILS UP X2, CALL BELL 
WITHIN REACH. HEAD OF THE BED ELEVATED FOR ASPIRATION PRECAUTION. WILL CONTINUE TO MONITOR 
CLOSELY.

## 2022-12-25 NOTE — NUR
MS  RN  CLOSING  NOTE



  PATIENT IN BED AWAKE  . A/OX1 (NAME).   ROOM AIR  AND  TOLERATED WELL , NO  SOB OR  
DISTRESS  NOTED  , NO S/S  OF  ANY P[AIN AND  DISCOMFORT   NOTED  .  ALL DUE MEDS  AS 
ORDERED  GIVEN  , R-HAND #20 INTACT AND PATENT W/ NS 75ML/HR. GLUCERNA 1.2 TF   ON  GOING  
60 ML / HOUR   . G- TUBE INTACT W/ NO SIGNS OF DISLODGMENT. DRESSING  ON THE  WOUND  DONE   
,  SAFETY MEASURES IN PLACE: BED LOCKED AND AT LOWEST POSITION, SIDE RAILS UP X2, CALL BELL 
WITHIN REACH. ENDORSED  TO  NEXT  SHIFT  .

## 2022-12-26 VITALS — SYSTOLIC BLOOD PRESSURE: 118 MMHG | DIASTOLIC BLOOD PRESSURE: 62 MMHG

## 2022-12-26 VITALS — DIASTOLIC BLOOD PRESSURE: 65 MMHG | SYSTOLIC BLOOD PRESSURE: 135 MMHG

## 2022-12-26 VITALS — SYSTOLIC BLOOD PRESSURE: 136 MMHG | DIASTOLIC BLOOD PRESSURE: 70 MMHG

## 2022-12-26 LAB
BASOPHILS # BLD AUTO: 0.1 K/UL (ref 0–0.2)
BASOPHILS NFR BLD AUTO: 0.8 % (ref 0–2)
BUN SERPL-MCNC: 20 MG/DL (ref 7–18)
CALCIUM SERPL-MCNC: 8.7 MG/DL (ref 8.5–10.1)
CHLORIDE SERPL-SCNC: 112 MMOL/L (ref 98–107)
CO2 SERPL-SCNC: 26 MMOL/L (ref 21–32)
CREAT SERPL-MCNC: 0.6 MG/DL (ref 0.6–1.3)
EOSINOPHIL NFR BLD AUTO: 5.1 % (ref 0–6)
GLUCOSE SERPL-MCNC: 110 MG/DL (ref 74–106)
HCT VFR BLD AUTO: 35 % (ref 33–45)
HGB BLD-MCNC: 11.3 G/DL (ref 11.5–14.8)
LYMPHOCYTES NFR BLD AUTO: 1.5 K/UL (ref 0.8–4.8)
LYMPHOCYTES NFR BLD AUTO: 15.4 % (ref 20–44)
MCHC RBC AUTO-ENTMCNC: 32 G/DL (ref 31–36)
MCV RBC AUTO: 86 FL (ref 82–100)
MONOCYTES NFR BLD AUTO: 0.7 K/UL (ref 0.1–1.3)
MONOCYTES NFR BLD AUTO: 7.1 % (ref 2–12)
NEUTROPHILS # BLD AUTO: 7.1 K/UL (ref 1.8–8.9)
NEUTROPHILS NFR BLD AUTO: 71.6 % (ref 43–81)
PLATELET # BLD AUTO: 281 K/UL (ref 150–450)
POTASSIUM SERPL-SCNC: 3.2 MMOL/L (ref 3.5–5.1)
RBC # BLD AUTO: 4.05 MIL/UL (ref 4–5.2)
SODIUM SERPL-SCNC: 144 MMOL/L (ref 136–145)
WBC NRBC COR # BLD AUTO: 9.9 K/UL (ref 4.3–11)

## 2022-12-26 RX ADMIN — Medication SCH APPLIC: at 08:51

## 2022-12-26 RX ADMIN — LOSARTAN POTASSIUM SCH MG: 50 TABLET, FILM COATED ORAL at 17:39

## 2022-12-26 RX ADMIN — Medication SCH MG: at 08:48

## 2022-12-26 RX ADMIN — Medication SCH EACH: at 12:14

## 2022-12-26 RX ADMIN — ACETAMINOPHEN SCH MG: 160 SOLUTION ORAL at 08:47

## 2022-12-26 RX ADMIN — SODIUM CHLORIDE PRN MLS/HR: 9 INJECTION, SOLUTION INTRAVENOUS at 19:03

## 2022-12-26 RX ADMIN — Medication SCH EACH: at 05:56

## 2022-12-26 RX ADMIN — DIVALPROEX SODIUM SCH MG: 250 TABLET, DELAYED RELEASE ORAL at 08:48

## 2022-12-26 RX ADMIN — OXYCODONE HYDROCHLORIDE AND ACETAMINOPHEN SCH MG: 500 TABLET ORAL at 17:40

## 2022-12-26 RX ADMIN — INSULIN HUMAN PRN UNITS: 100 INJECTION, SOLUTION PARENTERAL at 14:31

## 2022-12-26 RX ADMIN — TRAMADOL HYDROCHLORIDE SCH MG: 50 TABLET, FILM COATED ORAL at 08:49

## 2022-12-26 RX ADMIN — THERA TABS SCH UDTAB: TAB at 08:49

## 2022-12-26 RX ADMIN — Medication SCH OZ: at 08:51

## 2022-12-26 RX ADMIN — Medication SCH EACH: at 18:08

## 2022-12-26 RX ADMIN — AMLODIPINE BESYLATE SCH MG: 5 TABLET ORAL at 08:50

## 2022-12-26 RX ADMIN — TRAMADOL HYDROCHLORIDE SCH MG: 50 TABLET, FILM COATED ORAL at 17:40

## 2022-12-26 RX ADMIN — SODIUM CHLORIDE PRN MLS/HR: 9 INJECTION, SOLUTION INTRAVENOUS at 02:19

## 2022-12-26 RX ADMIN — ASPIRIN 81 MG SCH MG: 81 TABLET ORAL at 17:39

## 2022-12-26 RX ADMIN — Medication PRN ML: at 02:10

## 2022-12-26 RX ADMIN — QUETIAPINE SCH MG: 25 TABLET, FILM COATED ORAL at 21:02

## 2022-12-26 RX ADMIN — LOSARTAN POTASSIUM SCH MG: 50 TABLET, FILM COATED ORAL at 08:49

## 2022-12-26 RX ADMIN — ATORVASTATIN CALCIUM SCH MG: 40 TABLET, FILM COATED ORAL at 21:02

## 2022-12-26 NOTE — NUR
MS RN OPENING NOTE



RECEIVED PATIENT IN BED AWAKE, A/OX1 (NAME). BREATHING IN ROOM AIR  AND  TOLERATED WELL, NO 
SOB OR DISTRESS NOTED, NO S/S OF ANY PAIN AND DISCOMFORT NOTED. IV ACCESS AT LEFT HAND #20, 
PATENT AND INTACT. GLUCERNA 1.2 60ML/H. OFF AT THIS TIME. G- TUBE INTACT W/ NO SIGNS OF 
DISLODGMENT. NOTED  WITH  OPEN  BLISTER  ON THE  RIGHT UPPER  ARM  AND   COVERED  WITH  
TEGADERM PATCH  AND  ORDERED  FOR WOUND CARE CONSULT. SAFETY MEASURES IN PLACE: BED LOCKED 
AND AT LOWEST POSITION, SIDE RAILS UP X2, CALL BELL WITHIN REACH. WILL CONTINUE TO MONITOR.

## 2022-12-26 NOTE — NUR
MS  RN  CLOSING  NOTE



PATIENT IN BED AWAKE  . A/OX1 (NAME).   ROOM AIR  AND  TOLERATED WELL , NO  SOB OR  DISTRESS 
 NOTED  , NO S/S  OF  ANY PAIN AND  DISCOMFORT   NOTED  .  ALL DUE MEDS  AS ORDERED  GIVEN  
, R-HAND #20 INTACT AND PATENT W/ NS 75ML/HR. GLUCERNA 1.2 TF   ON  GOING  60 ML / HOUR   . 
G- TUBE INTACT W/ NO SIGNS OF DISLODGMENT. DRESSING  ON THE  WOUND  DONE   ,  NOTED  WITH  
OPEN  BLISTER  ON THE  RIGHT UPPER  ARM  AND   COVERED  WITH  TEGADERM PATCH  AND  ORDERED  
FOR WOUND  CARE   CONSULT  ,    SAFETY MEASURES IN PLACE: BED LOCKED AND AT LOWEST POSITION, 
SIDE RAILS UP X2, CALL BELL WITHIN REACH. ENDORSED  TO  NEXT  SHIFT  .

## 2022-12-26 NOTE — NUR
MS  RN CLOSING NOTE;

 PATIENT IN BED AWAKE. A/OX1 CONFUSED,ROOM AIR  AND  TOLERATED WELL , NO  SIGN SOB/DISTRESS 
NOTED,NO SIGN OF PAIN/DISCOMFORT AT THIS TIME, LEFT HAND IV ACCESS #20 INTACT AND PATENT W/ 
NS 75ML/HR. GLUCERNA 1.2  ON GOING  60 ML / HOUR ,NO RESIDUAL NOTED, GTUBE INTACT. ALL DUE 
MEDS GIVEN AS ORDERED.ALL SAFETY MEASURES IN PLACE: BED LOCKED AND AT LOWEST POSITION, SIDE 
RAILS UP X2, CALL BELL WITHIN REACH. HEAD OF THE BED ELEVATED FOR ASPIRATION PRECAUTION. 
WILL ENDORSE FOR CRYSTAL.

## 2022-12-26 NOTE — NUR
MS  RN OPENING NOTE



RECEIVED   PATIENT IN BED AWAKE  . A/OX1 (NAME).   ROOM AIR  AND  TOLERATED WELL , NO  SOB 
OR  DISTRESS  NOTED  , NO S/S  OF  ANY PAIN AND  DISCOMFORT   NOTED  .  L HAND #20 INTACT 
AND PATENT W/ NS 75ML/HR. GLUCERNA 1.2 TF   ON  GOING  60 ML / HOUR   . G- TUBE INTACT W/ NO 
SIGNS OF DISLODGMENT. SAFETY MEASURES IN PLACE: BED LOCKED AND AT LOWEST POSITION, SIDE 
RAILS UP X2, CALL BELL WITHIN REACH. WILL CONTINUE TO MONITOR PATIENT.

## 2022-12-27 VITALS — SYSTOLIC BLOOD PRESSURE: 138 MMHG | DIASTOLIC BLOOD PRESSURE: 52 MMHG

## 2022-12-27 VITALS — DIASTOLIC BLOOD PRESSURE: 46 MMHG | SYSTOLIC BLOOD PRESSURE: 90 MMHG

## 2022-12-27 VITALS — SYSTOLIC BLOOD PRESSURE: 136 MMHG | DIASTOLIC BLOOD PRESSURE: 63 MMHG

## 2022-12-27 LAB
BASOPHILS # BLD AUTO: 0.1 K/UL (ref 0–0.2)
BASOPHILS NFR BLD AUTO: 0.5 % (ref 0–2)
BUN SERPL-MCNC: 17 MG/DL (ref 7–18)
CALCIUM SERPL-MCNC: 8.9 MG/DL (ref 8.5–10.1)
CHLORIDE SERPL-SCNC: 111 MMOL/L (ref 98–107)
CO2 SERPL-SCNC: 25 MMOL/L (ref 21–32)
CREAT SERPL-MCNC: 0.5 MG/DL (ref 0.6–1.3)
EOSINOPHIL NFR BLD AUTO: 5.7 % (ref 0–6)
GLUCOSE SERPL-MCNC: 106 MG/DL (ref 74–106)
HCT VFR BLD AUTO: 36 % (ref 33–45)
HGB BLD-MCNC: 11.9 G/DL (ref 11.5–14.8)
LYMPHOCYTES NFR BLD AUTO: 1.6 K/UL (ref 0.8–4.8)
LYMPHOCYTES NFR BLD AUTO: 15.6 % (ref 20–44)
MAGNESIUM SERPL-MCNC: 1.9 MG/DL (ref 1.8–2.4)
MCHC RBC AUTO-ENTMCNC: 33 G/DL (ref 31–36)
MCV RBC AUTO: 86 FL (ref 82–100)
MONOCYTES NFR BLD AUTO: 0.8 K/UL (ref 0.1–1.3)
MONOCYTES NFR BLD AUTO: 7.3 % (ref 2–12)
NEUTROPHILS # BLD AUTO: 7.4 K/UL (ref 1.8–8.9)
NEUTROPHILS NFR BLD AUTO: 70.9 % (ref 43–81)
PHOSPHATE SERPL-MCNC: 2.5 MG/DL (ref 2.5–4.9)
PLATELET # BLD AUTO: 304 K/UL (ref 150–450)
POTASSIUM SERPL-SCNC: 3.8 MMOL/L (ref 3.5–5.1)
RBC # BLD AUTO: 4.19 MIL/UL (ref 4–5.2)
SODIUM SERPL-SCNC: 143 MMOL/L (ref 136–145)
WBC NRBC COR # BLD AUTO: 10.4 K/UL (ref 4.3–11)

## 2022-12-27 RX ADMIN — Medication SCH APPLIC: at 08:39

## 2022-12-27 RX ADMIN — SODIUM CHLORIDE PRN MLS/HR: 9 INJECTION, SOLUTION INTRAVENOUS at 15:39

## 2022-12-27 RX ADMIN — AMLODIPINE BESYLATE SCH MG: 5 TABLET ORAL at 08:41

## 2022-12-27 RX ADMIN — ACETAMINOPHEN SCH MG: 160 SOLUTION ORAL at 08:40

## 2022-12-27 RX ADMIN — ATORVASTATIN CALCIUM SCH MG: 40 TABLET, FILM COATED ORAL at 20:59

## 2022-12-27 RX ADMIN — Medication SCH MG: at 08:40

## 2022-12-27 RX ADMIN — Medication SCH EACH: at 06:08

## 2022-12-27 RX ADMIN — TRAMADOL HYDROCHLORIDE SCH MG: 50 TABLET, FILM COATED ORAL at 08:41

## 2022-12-27 RX ADMIN — DIVALPROEX SODIUM SCH MG: 250 TABLET, DELAYED RELEASE ORAL at 08:41

## 2022-12-27 RX ADMIN — Medication PRN ML: at 04:30

## 2022-12-27 RX ADMIN — THERA TABS SCH UDTAB: TAB at 08:40

## 2022-12-27 RX ADMIN — LOSARTAN POTASSIUM SCH MG: 50 TABLET, FILM COATED ORAL at 08:41

## 2022-12-27 RX ADMIN — Medication SCH OZ: at 08:39

## 2022-12-27 RX ADMIN — ASPIRIN 81 MG SCH MG: 81 TABLET ORAL at 17:26

## 2022-12-27 RX ADMIN — TRAMADOL HYDROCHLORIDE SCH MG: 50 TABLET, FILM COATED ORAL at 17:26

## 2022-12-27 RX ADMIN — Medication SCH EACH: at 11:59

## 2022-12-27 RX ADMIN — Medication SCH EACH: at 00:32

## 2022-12-27 RX ADMIN — OXYCODONE HYDROCHLORIDE AND ACETAMINOPHEN SCH MG: 500 TABLET ORAL at 17:26

## 2022-12-27 RX ADMIN — Medication SCH EACH: at 18:15

## 2022-12-27 RX ADMIN — LOSARTAN POTASSIUM SCH MG: 50 TABLET, FILM COATED ORAL at 17:26

## 2022-12-27 RX ADMIN — DIVALPROEX SODIUM SCH MG: 125 CAPSULE ORAL at 20:58

## 2022-12-27 NOTE — NUR
RN NOTES

PATIENT ACCIDENTALLY PULLED OUT HER IV. PRESSURE DRESSING APPLIED. COLD COMPRESS DONE. RE 
INSERTED AT RIGHT FA #[id number]. INTACT AND PATENT. INFUSING WELL.

## 2022-12-27 NOTE — NUR
MS RN OPENING NOTE



RECEIVED PATIENT IN BED AWAKE, A/OX1 (NAME). BREATHING IN ROOM AIR  AND  TOLERATED WELL, NO 
SOB OR DISTRESS NOTED. COMPLAINS OF PAIN AT RIGHT FOOT, MOANING AND GUARDING SITE OF PAIN. 
GIVEN TYLENOL PRN VIA G-TUBE AS ORDERED. TOLERATED WELL. IV ACCESS AT RIGHT HAND #22, PATENT 
AND INTACT. GLUCERNA 1.2 60ML/H. OFF AT THIS TIME. G- TUBE INTACT W/ NO SIGNS OF 
DISLODGMENT. SAFETY MEASURES IN PLACE: BED LOCKED AND AT LOWEST POSITION, SIDE RAILS UP X2, 
CALL BELL WITHIN REACH. RIGHT HAND MITTENS IN PLACE, CIRCULATION CHECKED EVERY 2 HOURS AND 
RELEASED. WILL CONTINUE TO MONITOR.

## 2022-12-27 NOTE — NUR
RN  CLOSING  NOTE

PATIENT IN BED AWAKE  . A/OX1 .  ON ROOM AIR  AND  TOLERATED WELL , NO  SOB OR  DISTRESS  
NOTED  , NO S/S  OF  ANY PAIN AND  DISCOMFORT  NOTED.  ALL DUE MEDS  AS ORDERED  GIVEN  , 
R-FA #22G INTACT AND PATENT RUNNING NS 75ML/HR. G TUBE RUNNING GLUCERNA 1.2 TF  ON  GOING  
60 ML / HOUR . G- TUBE INTACT W/ NO SIGNS OF DISLODGMENT. DRESSING  ON THE  WOUND  DONE ,  
KEPT PATIENT CLEAN AND DRY. REPOSITIONED EVERY 2HRS.    SAFETY MEASURES IN PLACE: BED LOCKED 
AND AT LOWEST POSITION, SIDE RAILS UP X2, CALL BELL WITHIN REACH. WILL ENDORSE TO NIGHT  
SHIFT NURSE FOR CRYSTAL.

## 2022-12-27 NOTE — NUR
MS RN CLOSING NOTE



PATIENT IN BED ASLEEP, A/OX1 (NAME). BREATHING IN ROOM AIR  AND  TOLERATED WELL, NO SOB OR 
DISTRESS NOTED, NO S/S OF ANY PAIN AND DISCOMFORT NOTED. IV ACCESS AT RFA #22, PATENT AND 
INTACT INFUSING NS @75ML/H. GLUCERNA 1.2 60ML/H. G- TUBE INTACT W/ NO SIGNS OF DISLODGMENT. 
NOTED  WITH  OPEN  BLISTER  ON THE  RIGHT UPPER  ARM  AND   COVERED  WITH  TEGADERM PATCH  
AND  ORDERED  FOR WOUND CARE CONSULT. SAFETY MEASURES IN PLACE: BED LOCKED AND AT LOWEST 
POSITION, SIDE RAILS UP X2, CALL BELL WITHIN REACH. WILL ENDORSE TO NEXT SHIFT RN FOR CRYSTAL.

## 2022-12-27 NOTE — NUR
WOUND CARE CONSULT: PT SEEN FOR RE-EVALUATION OF RT ELBOW OPEN BLISTER.  NO SIGN OF 
INFECTION NOTED. RECOMMENDATIONS MADE FOR SKIN PROTECTION AND WOUND CARE. DISCUSSED WITH 
NURSING STAFF. MD IN AGREEMENT WITH PLAN OF CARE. 

-------------------------------------------------------------------------------

Addendum: 12/27/22 at 0900 by LANDRY MENDIETA [email or web address]

-------------------------------------------------------------------------------

Amended: Links added.

## 2022-12-27 NOTE — NUR
RN OPENING NOTE



RECEIVED PATIENT IN BED AWAKE. A/OX1. BREATHING ON RA  AND  TOLERATED WELL , NO  SOB OR  
DISTRESS  NOTED AT THE MOMENT.  NO S/S  OF  ANY PAIN AND  DISCOMFORT NOTED. IV ACCESS R HAND 
#20 INTACT AND PATENT RUNNING NS 75ML/HR. G TUBE RUNNING GLUCERNA 1.2 TF ON  GOING  60 ML / 
HOUR. G- TUBE INTACT W/ NO SIGNS OF DISLODGMENT. SAFETY MEASURES IN PLACE: BED LOCKED AND AT 
LOWEST POSITION, HOB ELEVATED, SIDE RAILS UP X2, CALL BELL AND TABLE WITHIN EASY REACH. WILL 
CONTINUE TO MONITOR PATIENT.

## 2022-12-28 VITALS — DIASTOLIC BLOOD PRESSURE: 81 MMHG | SYSTOLIC BLOOD PRESSURE: 147 MMHG

## 2022-12-28 LAB
BASOPHILS # BLD AUTO: 0.1 K/UL (ref 0–0.2)
BASOPHILS NFR BLD AUTO: 0.6 % (ref 0–2)
BUN SERPL-MCNC: 11 MG/DL (ref 7–18)
CALCIUM SERPL-MCNC: 9.1 MG/DL (ref 8.5–10.1)
CHLORIDE SERPL-SCNC: 109 MMOL/L (ref 98–107)
CO2 SERPL-SCNC: 23 MMOL/L (ref 21–32)
CREAT SERPL-MCNC: 0.5 MG/DL (ref 0.6–1.3)
EOSINOPHIL NFR BLD AUTO: 4.7 % (ref 0–6)
GLUCOSE SERPL-MCNC: 116 MG/DL (ref 74–106)
HCT VFR BLD AUTO: 38 % (ref 33–45)
HGB BLD-MCNC: 12 G/DL (ref 11.5–14.8)
LYMPHOCYTES NFR BLD AUTO: 1.5 K/UL (ref 0.8–4.8)
LYMPHOCYTES NFR BLD AUTO: 13.5 % (ref 20–44)
MAGNESIUM SERPL-MCNC: 2 MG/DL (ref 1.8–2.4)
MCHC RBC AUTO-ENTMCNC: 32 G/DL (ref 31–36)
MCV RBC AUTO: 86 FL (ref 82–100)
MONOCYTES NFR BLD AUTO: 0.8 K/UL (ref 0.1–1.3)
MONOCYTES NFR BLD AUTO: 7.4 % (ref 2–12)
NEUTROPHILS # BLD AUTO: 8.4 K/UL (ref 1.8–8.9)
NEUTROPHILS NFR BLD AUTO: 73.8 % (ref 43–81)
PHOSPHATE SERPL-MCNC: 2.5 MG/DL (ref 2.5–4.9)
PLATELET # BLD AUTO: 279 K/UL (ref 150–450)
POTASSIUM SERPL-SCNC: 3.3 MMOL/L (ref 3.5–5.1)
RBC # BLD AUTO: 4.42 MIL/UL (ref 4–5.2)
SODIUM SERPL-SCNC: 140 MMOL/L (ref 136–145)
WBC NRBC COR # BLD AUTO: 11.4 K/UL (ref 4.3–11)

## 2022-12-28 PROCEDURE — 0JB70ZZ EXCISION OF BACK SUBCUTANEOUS TISSUE AND FASCIA, OPEN APPROACH: ICD-10-PCS

## 2022-12-28 RX ADMIN — LOSARTAN POTASSIUM SCH MG: 50 TABLET, FILM COATED ORAL at 09:28

## 2022-12-28 RX ADMIN — INSULIN HUMAN PRN UNITS: 100 INJECTION, SOLUTION PARENTERAL at 12:42

## 2022-12-28 RX ADMIN — DIVALPROEX SODIUM SCH MG: 125 CAPSULE ORAL at 09:27

## 2022-12-28 RX ADMIN — Medication SCH APPLIC: at 09:29

## 2022-12-28 RX ADMIN — Medication SCH MG: at 09:27

## 2022-12-28 RX ADMIN — Medication SCH OZ: at 09:29

## 2022-12-28 RX ADMIN — Medication SCH EACH: at 12:40

## 2022-12-28 RX ADMIN — Medication SCH EACH: at 01:01

## 2022-12-28 RX ADMIN — TRAMADOL HYDROCHLORIDE SCH MG: 50 TABLET, FILM COATED ORAL at 09:28

## 2022-12-28 RX ADMIN — ACETAMINOPHEN SCH MG: 160 SOLUTION ORAL at 09:27

## 2022-12-28 RX ADMIN — THERA TABS SCH UDTAB: TAB at 09:27

## 2022-12-28 RX ADMIN — AMLODIPINE BESYLATE SCH MG: 5 TABLET ORAL at 09:28

## 2022-12-28 RX ADMIN — Medication SCH EACH: at 06:38

## 2022-12-28 NOTE — NUR
RN DISCHARGE NOTES



PATIENT DISCHARGED TO SNF, REPORT GIVEN TO LIZETT. PATIENT A/Ox2 BLIND, ABLE TO VERBALIZE 
NEEDS. PATIENT STABLE ON ROOM AIR, V/S STABLE. PATIENT IV ACCESS REMOVED. PRESSURE DRESSING 
APPLIED. PATIENT UNABLE TO COMPREHEND HEALTH TEACHINGS AND DISCHARGE INSTRUCTIONS. GIVEN TO 
LIZETT. PHOTOS TAKEN OF SKIN ISSUES AND FILED INTO CHART. PATIENT LEFT UNIT ACCOMPANIED BY 
TWO EMTs, @1630. CHARGE NURSE AND MD AWARE OF DISCHARGE.

## 2022-12-28 NOTE — NUR
MS RN CLOSING NOTE



PATIENT IN BED AWAKE, A/OX1 (NAME). BREATHING IN ROOM AIR  AND  TOLERATED WELL, NO SOB OR 
DISTRESS NOTED. IV ACCESS AT RIGHT HAND #22, PATENT AND INTACT. GLUCERNA 1.2 60ML/H. G- TUBE 
INTACT W/ NO SIGNS OF DISLODGMENT. SAFETY MEASURES IN PLACE: BED LOCKED AND AT LOWEST 
POSITION, SIDE RAILS UP X2, CALL BELL WITHIN REACH. RIGHT HAND MITTENS IN PLACE, CIRCULATION 
CHECKED EVERY 2 HOURS AND RELEASED. WOUND CARE DONE. WILL ENDORSE TO NEXT SHIFT RN FOR CRYSTAL.

## 2022-12-28 NOTE — NUR
MS RN OPENING NOTES



RECEIVED PATIENT AWAKE IN BED, A/Ox2, BLIND. ABLE TO VERBALIZE NEEDS. ON ROOM AIR NO S/S OF 
RESPIRATORY DISTRESS OR DISCOMFORT. IV ACCESS R FA #22 WITH NS RUNNING @75 ML/HR. INTACT AND 
PATENT. PATIENT HAS MITTEN ON RIGHT HAND, CIRCULATION AND SKIN WNL. PATIENT HAS TUBE FEEDING 
GLUCERNA 1.2 @60 ML/HR. TOLERATING WELL. PATIENT IS INCONTINENT USES DIAPER. SKIN ISSUES: R 
ELBOW OPEN BLISTER, DRESSING IN PLACE NO DRAINAGE NOTED. SAFETY MEASURES IN PLACE: BED 
LOCKED AND IN LOWEST POSITION, HOB ELEVATED, SIDE RAILS UP x3, BED ALARM ON, CALL LIGHT 
WITHIN REACH. WILL CONTINUE TO MONITOR.

## 2023-02-07 ENCOUNTER — HOSPITAL ENCOUNTER (EMERGENCY)
Dept: HOSPITAL 54 - ER | Age: 78
Discharge: NURSING, REHAB OR LONG-TERM CARE | End: 2023-02-07
Payer: MEDICARE

## 2023-02-07 VITALS
SYSTOLIC BLOOD PRESSURE: 99 MMHG | HEIGHT: 61 IN | BODY MASS INDEX: 20.01 KG/M2 | DIASTOLIC BLOOD PRESSURE: 46 MMHG | WEIGHT: 106 LBS

## 2023-02-07 DIAGNOSIS — I10: ICD-10-CM

## 2023-02-07 DIAGNOSIS — F17.200: ICD-10-CM

## 2023-02-07 DIAGNOSIS — Z43.1: Primary | ICD-10-CM

## 2023-02-07 DIAGNOSIS — Z79.82: ICD-10-CM

## 2023-02-07 DIAGNOSIS — Z79.4: ICD-10-CM

## 2023-02-07 DIAGNOSIS — E11.9: ICD-10-CM

## 2023-02-07 DIAGNOSIS — Z79.899: ICD-10-CM

## 2023-02-07 DIAGNOSIS — M54.50: ICD-10-CM

## 2023-02-07 DIAGNOSIS — Z86.73: ICD-10-CM

## 2023-02-07 DIAGNOSIS — G89.29: ICD-10-CM

## 2023-02-07 PROCEDURE — 99284 EMERGENCY DEPT VISIT MOD MDM: CPT

## 2023-02-07 PROCEDURE — 43762 RPLC GTUBE NO REVJ TRC: CPT

## 2023-02-07 PROCEDURE — 74018 RADEX ABDOMEN 1 VIEW: CPT

## 2023-02-07 NOTE — NUR
TO ER BED 14, [facility], PULLED OUT HER G TUBE THIS MORNING, 
AAOX1, BREATHING EVEN AND NON LABORED, AWAITING MD [place]

## 2023-02-27 ENCOUNTER — HOSPITAL ENCOUNTER (INPATIENT)
Dept: HOSPITAL 54 - ER | Age: 78
LOS: 11 days | Discharge: NURSING, REHAB OR LONG-TERM CARE | DRG: 853 | End: 2023-03-10
Attending: LEGAL MEDICINE | Admitting: LEGAL MEDICINE
Payer: MEDICARE

## 2023-02-27 VITALS — SYSTOLIC BLOOD PRESSURE: 117 MMHG | DIASTOLIC BLOOD PRESSURE: 60 MMHG

## 2023-02-27 VITALS — BODY MASS INDEX: 24.92 KG/M2 | WEIGHT: 132 LBS | HEIGHT: 61 IN

## 2023-02-27 DIAGNOSIS — M62.421: ICD-10-CM

## 2023-02-27 DIAGNOSIS — A41.9: Primary | ICD-10-CM

## 2023-02-27 DIAGNOSIS — R53.2: ICD-10-CM

## 2023-02-27 DIAGNOSIS — Z79.51: ICD-10-CM

## 2023-02-27 DIAGNOSIS — J18.9: ICD-10-CM

## 2023-02-27 DIAGNOSIS — R65.21: ICD-10-CM

## 2023-02-27 DIAGNOSIS — R65.20: ICD-10-CM

## 2023-02-27 DIAGNOSIS — I69.351: ICD-10-CM

## 2023-02-27 DIAGNOSIS — R62.7: ICD-10-CM

## 2023-02-27 DIAGNOSIS — G89.29: ICD-10-CM

## 2023-02-27 DIAGNOSIS — R13.10: ICD-10-CM

## 2023-02-27 DIAGNOSIS — K92.2: ICD-10-CM

## 2023-02-27 DIAGNOSIS — E11.621: ICD-10-CM

## 2023-02-27 DIAGNOSIS — G92.8: ICD-10-CM

## 2023-02-27 DIAGNOSIS — F17.200: ICD-10-CM

## 2023-02-27 DIAGNOSIS — Z79.899: ICD-10-CM
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
## 2023-02-27 DIAGNOSIS — Z79.4: ICD-10-CM

## 2023-02-27 DIAGNOSIS — Z20.822: ICD-10-CM

## 2023-02-27 DIAGNOSIS — Z87.440: ICD-10-CM

## 2023-02-27 DIAGNOSIS — I10: ICD-10-CM

## 2023-02-27 DIAGNOSIS — N17.0: ICD-10-CM

## 2023-02-27 DIAGNOSIS — E78.5: ICD-10-CM

## 2023-02-27 DIAGNOSIS — M62.422: ICD-10-CM

## 2023-02-27 DIAGNOSIS — E87.0: ICD-10-CM

## 2023-02-27 DIAGNOSIS — J44.0: ICD-10-CM

## 2023-02-27 DIAGNOSIS — L97.519: ICD-10-CM

## 2023-02-27 DIAGNOSIS — S50.812A: ICD-10-CM

## 2023-02-27 DIAGNOSIS — D62: ICD-10-CM

## 2023-02-27 DIAGNOSIS — J98.11: ICD-10-CM

## 2023-02-27 DIAGNOSIS — L89.123: ICD-10-CM

## 2023-02-27 DIAGNOSIS — Z93.1: ICD-10-CM

## 2023-02-27 DIAGNOSIS — Z86.79: ICD-10-CM

## 2023-02-27 DIAGNOSIS — Z79.82: ICD-10-CM
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
## 2023-02-27 LAB
ALBUMIN SERPL BCP-MCNC: 2.5 G/DL (ref 3.4–5)
ALP SERPL-CCNC: 82 U/L (ref 46–116)
ALT SERPL W P-5'-P-CCNC: 26 U/L (ref 12–78)
AST SERPL W P-5'-P-CCNC: 36 U/L (ref 15–37)
BASOPHILS # BLD AUTO: 0 K/UL (ref 0–0.2)
BASOPHILS NFR BLD AUTO: 0.2 % (ref 0–2)
BILIRUB DIRECT SERPL-MCNC: 0.1 MG/DL (ref 0–0.2)
BILIRUB SERPL-MCNC: 0.2 MG/DL (ref 0.2–1)
BILIRUB UR QL STRIP: NEGATIVE
BUN SERPL-MCNC: 99 MG/DL (ref 7–18)
CALCIUM SERPL-MCNC: 9.8 MG/DL (ref 8.5–10.1)
CHLORIDE SERPL-SCNC: 102 MMOL/L (ref 98–107)
CO2 SERPL-SCNC: 24 MMOL/L (ref 21–32)
COLOR UR: YELLOW
CREAT SERPL-MCNC: 0.9 MG/DL (ref 0.6–1.3)
EOSINOPHIL NFR BLD AUTO: 0.1 % (ref 0–6)
GLUCOSE SERPL-MCNC: 202 MG/DL (ref 74–106)
GLUCOSE UR STRIP-MCNC: NEGATIVE MG/DL
HCT VFR BLD AUTO: 31 % (ref 33–45)
HGB BLD-MCNC: 9.9 G/DL (ref 11.5–14.8)
LEUKOCYTE ESTERASE UR QL STRIP: NEGATIVE
LYMPHOCYTES NFR BLD AUTO: 1.8 K/UL (ref 0.8–4.8)
LYMPHOCYTES NFR BLD AUTO: 8.7 % (ref 20–44)
LYMPHOCYTES NFR BLD MANUAL: 6 % (ref 16–48)
MCHC RBC AUTO-ENTMCNC: 32 G/DL (ref 31–36)
MCV RBC AUTO: 86 FL (ref 82–100)
MONOCYTES NFR BLD AUTO: 1.2 K/UL (ref 0.1–1.3)
MONOCYTES NFR BLD AUTO: 5.5 % (ref 2–12)
MONOCYTES NFR BLD MANUAL: 6 % (ref 0–11)
NEUTROPHILS # BLD AUTO: 18.2 K/UL (ref 1.8–8.9)
NEUTROPHILS NFR BLD AUTO: 85.5 % (ref 43–81)
NEUTS BAND NFR BLD MANUAL: 2 % (ref 0–5)
NEUTS SEG NFR BLD MANUAL: 86 % (ref 42–76)
NITRITE UR QL STRIP: NEGATIVE
PH UR STRIP: 6 [PH] (ref 5–8)
PLATELET # BLD AUTO: 342 K/UL (ref 150–450)
POTASSIUM SERPL-SCNC: 3.8 MMOL/L (ref 3.5–5.1)
PROT SERPL-MCNC: 6.8 G/DL (ref 6.4–8.2)
PROT UR QL STRIP: NEGATIVE MG/DL
RBC # BLD AUTO: 3.58 MIL/UL (ref 4–5.2)
RBC #/AREA URNS HPF: (no result) /HPF (ref 0–2)
SODIUM SERPL-SCNC: 137 MMOL/L (ref 136–145)
UROBILINOGEN UR STRIP-MCNC: 0.2 EU/DL
WBC #/AREA URNS HPF: (no result) /HPF (ref 0–3)
WBC NRBC COR # BLD AUTO: 21.3 K/UL (ref 4.3–11)

## 2023-02-27 PROCEDURE — A4223 INFUSION SUPPLIES W/O PUMP: HCPCS

## 2023-02-27 PROCEDURE — C9113 INJ PANTOPRAZOLE SODIUM, VIA: HCPCS

## 2023-02-27 PROCEDURE — P9016 RBC LEUKOCYTES REDUCED: HCPCS

## 2023-02-27 PROCEDURE — C9803 HOPD COVID-19 SPEC COLLECT: HCPCS

## 2023-02-27 PROCEDURE — G0378 HOSPITAL OBSERVATION PER HR: HCPCS

## 2023-02-27 RX ADMIN — PIPERACILLIN SODIUM AND TAZOBACTAM SODIUM SCH MLS/HR: .375; 3 INJECTION, POWDER, LYOPHILIZED, FOR SOLUTION INTRAVENOUS at 18:16

## 2023-02-27 RX ADMIN — DEXTROSE AND SODIUM CHLORIDE SCH MLS/HR: 5; 900 INJECTION, SOLUTION INTRAVENOUS at 17:35

## 2023-02-27 RX ADMIN — Medication SCH EACH: at 18:34

## 2023-02-28 VITALS — DIASTOLIC BLOOD PRESSURE: 81 MMHG | SYSTOLIC BLOOD PRESSURE: 132 MMHG

## 2023-02-28 VITALS — DIASTOLIC BLOOD PRESSURE: 87 MMHG | SYSTOLIC BLOOD PRESSURE: 122 MMHG

## 2023-02-28 VITALS — DIASTOLIC BLOOD PRESSURE: 61 MMHG | SYSTOLIC BLOOD PRESSURE: 124 MMHG

## 2023-02-28 LAB
BASOPHILS # BLD AUTO: 0.1 K/UL (ref 0–0.2)
BASOPHILS NFR BLD AUTO: 0.5 % (ref 0–2)
BUN SERPL-MCNC: 63 MG/DL (ref 7–18)
CALCIUM SERPL-MCNC: 9.5 MG/DL (ref 8.5–10.1)
CHLORIDE SERPL-SCNC: 114 MMOL/L (ref 98–107)
CO2 SERPL-SCNC: 23 MMOL/L (ref 21–32)
CREAT SERPL-MCNC: 0.8 MG/DL (ref 0.6–1.3)
EOSINOPHIL NFR BLD AUTO: 0 % (ref 0–6)
GLUCOSE SERPL-MCNC: 184 MG/DL (ref 74–106)
HCT VFR BLD AUTO: 28 % (ref 33–45)
HGB BLD-MCNC: 8.7 G/DL (ref 11.5–14.8)
LYMPHOCYTES NFR BLD AUTO: 1.5 K/UL (ref 0.8–4.8)
LYMPHOCYTES NFR BLD AUTO: 6.5 % (ref 20–44)
MCHC RBC AUTO-ENTMCNC: 31 G/DL (ref 31–36)
MCV RBC AUTO: 88 FL (ref 82–100)
MONOCYTES NFR BLD AUTO: 2 K/UL (ref 0.1–1.3)
MONOCYTES NFR BLD AUTO: 8.4 % (ref 2–12)
NEUTROPHILS # BLD AUTO: 19.8 K/UL (ref 1.8–8.9)
NEUTROPHILS NFR BLD AUTO: 84.6 % (ref 43–81)
PLATELET # BLD AUTO: 252 K/UL (ref 150–450)
POTASSIUM SERPL-SCNC: 3.2 MMOL/L (ref 3.5–5.1)
RBC # BLD AUTO: 3.19 MIL/UL (ref 4–5.2)
SODIUM SERPL-SCNC: 148 MMOL/L (ref 136–145)
WBC NRBC COR # BLD AUTO: 23.4 K/UL (ref 4.3–11)

## 2023-02-28 PROCEDURE — 0JB70ZZ EXCISION OF BACK SUBCUTANEOUS TISSUE AND FASCIA, OPEN APPROACH: ICD-10-PCS

## 2023-02-28 RX ADMIN — POTASSIUM CHLORIDE SCH MLS/HR: 200 INJECTION, SOLUTION INTRAVENOUS at 13:56

## 2023-02-28 RX ADMIN — Medication SCH EACH: at 00:37

## 2023-02-28 RX ADMIN — POTASSIUM CHLORIDE SCH MLS/HR: 200 INJECTION, SOLUTION INTRAVENOUS at 12:48

## 2023-02-28 RX ADMIN — Medication SCH OZ: at 14:09

## 2023-02-28 RX ADMIN — Medication SCH EACH: at 17:38

## 2023-02-28 RX ADMIN — Medication SCH OZ: at 23:11

## 2023-02-28 RX ADMIN — PIPERACILLIN SODIUM AND TAZOBACTAM SODIUM SCH MLS/HR: .375; 3 INJECTION, POWDER, LYOPHILIZED, FOR SOLUTION INTRAVENOUS at 05:44

## 2023-02-28 RX ADMIN — DEXTROSE MONOHYDRATE SCH MLS/HR: 50 INJECTION, SOLUTION INTRAVENOUS at 18:45

## 2023-02-28 RX ADMIN — LOSARTAN POTASSIUM SCH MG: 50 TABLET, FILM COATED ORAL at 17:46

## 2023-02-28 RX ADMIN — Medication SCH EACH: at 12:22

## 2023-02-28 RX ADMIN — Medication SCH EACH: at 06:37

## 2023-02-28 RX ADMIN — QUETIAPINE SCH MG: 25 TABLET, FILM COATED ORAL at 22:49

## 2023-02-28 RX ADMIN — INSULIN HUMAN PRN UNITS: 100 INJECTION, SOLUTION PARENTERAL at 00:38

## 2023-02-28 RX ADMIN — DEXTROSE AND SODIUM CHLORIDE SCH MLS/HR: 5; 900 INJECTION, SOLUTION INTRAVENOUS at 05:24

## 2023-02-28 RX ADMIN — INSULIN HUMAN PRN UNITS: 100 INJECTION, SOLUTION PARENTERAL at 06:37

## 2023-02-28 RX ADMIN — PIPERACILLIN SODIUM AND TAZOBACTAM SODIUM SCH MLS/HR: .375; 3 INJECTION, POWDER, LYOPHILIZED, FOR SOLUTION INTRAVENOUS at 18:20

## 2023-02-28 RX ADMIN — SODIUM CHLORIDE SCH MG: 9 INJECTION, SOLUTION INTRAVENOUS at 09:06

## 2023-02-28 RX ADMIN — INSULIN HUMAN PRN UNITS: 100 INJECTION, SOLUTION PARENTERAL at 14:08

## 2023-02-28 RX ADMIN — CHLORHEXIDINE GLUCONATE 0.12% ORAL RINSE SCH ML: 1.2 LIQUID ORAL at 17:46

## 2023-02-28 RX ADMIN — Medication SCH OZ: at 12:02

## 2023-02-28 RX ADMIN — DEXTROSE AND SODIUM CHLORIDE SCH MLS/HR: 5; 900 INJECTION, SOLUTION INTRAVENOUS at 11:58

## 2023-02-28 RX ADMIN — PIPERACILLIN SODIUM AND TAZOBACTAM SODIUM SCH MLS/HR: .375; 3 INJECTION, POWDER, LYOPHILIZED, FOR SOLUTION INTRAVENOUS at 12:13

## 2023-02-28 RX ADMIN — PIPERACILLIN SODIUM AND TAZOBACTAM SODIUM SCH MLS/HR: .375; 3 INJECTION, POWDER, LYOPHILIZED, FOR SOLUTION INTRAVENOUS at 00:09

## 2023-02-28 RX ADMIN — Medication SCH EACH: at 23:57

## 2023-02-28 RX ADMIN — TRAMADOL HYDROCHLORIDE SCH MG: 50 TABLET, FILM COATED ORAL at 17:46

## 2023-02-28 RX ADMIN — PIPERACILLIN SODIUM AND TAZOBACTAM SODIUM SCH MLS/HR: .375; 3 INJECTION, POWDER, LYOPHILIZED, FOR SOLUTION INTRAVENOUS at 23:57

## 2023-02-28 RX ADMIN — ATORVASTATIN CALCIUM SCH MG: 40 TABLET, FILM COATED ORAL at 22:49

## 2023-02-28 RX ADMIN — Medication SCH ML: at 15:26

## 2023-03-01 VITALS — DIASTOLIC BLOOD PRESSURE: 61 MMHG | SYSTOLIC BLOOD PRESSURE: 122 MMHG

## 2023-03-01 VITALS — SYSTOLIC BLOOD PRESSURE: 108 MMHG | DIASTOLIC BLOOD PRESSURE: 41 MMHG

## 2023-03-01 VITALS — SYSTOLIC BLOOD PRESSURE: 102 MMHG | DIASTOLIC BLOOD PRESSURE: 52 MMHG

## 2023-03-01 LAB
BASOPHILS # BLD AUTO: 0 K/UL (ref 0–0.2)
BASOPHILS NFR BLD AUTO: 0.1 % (ref 0–2)
BUN SERPL-MCNC: 38 MG/DL (ref 7–18)
CALCIUM SERPL-MCNC: 9.3 MG/DL (ref 8.5–10.1)
CHLORIDE SERPL-SCNC: 123 MMOL/L (ref 98–107)
CO2 SERPL-SCNC: 29 MMOL/L (ref 21–32)
CREAT SERPL-MCNC: 0.8 MG/DL (ref 0.6–1.3)
EOSINOPHIL NFR BLD AUTO: 0 % (ref 0–6)
GLUCOSE SERPL-MCNC: 130 MG/DL (ref 74–106)
HCT VFR BLD AUTO: 23 % (ref 33–45)
HGB BLD-MCNC: 7.3 G/DL (ref 11.5–14.8)
LYMPHOCYTES NFR BLD AUTO: 1.3 K/UL (ref 0.8–4.8)
LYMPHOCYTES NFR BLD AUTO: 6.2 % (ref 20–44)
MCHC RBC AUTO-ENTMCNC: 31 G/DL (ref 31–36)
MCV RBC AUTO: 88 FL (ref 82–100)
MONOCYTES NFR BLD AUTO: 10.7 % (ref 2–12)
MONOCYTES NFR BLD AUTO: 2.2 K/UL (ref 0.1–1.3)
NEUTROPHILS # BLD AUTO: 17.3 K/UL (ref 1.8–8.9)
NEUTROPHILS NFR BLD AUTO: 83 % (ref 43–81)
PLATELET # BLD AUTO: 277 K/UL (ref 150–450)
POTASSIUM SERPL-SCNC: 3 MMOL/L (ref 3.5–5.1)
RBC # BLD AUTO: 2.64 MIL/UL (ref 4–5.2)
SODIUM SERPL-SCNC: 158 MMOL/L (ref 136–145)
WBC NRBC COR # BLD AUTO: 20.9 K/UL (ref 4.3–11)

## 2023-03-01 PROCEDURE — 05H933Z INSERTION OF INFUSION DEVICE INTO RIGHT BRACHIAL VEIN, PERCUTANEOUS APPROACH: ICD-10-PCS | Performed by: NURSE PRACTITIONER

## 2023-03-01 RX ADMIN — SODIUM CHLORIDE, SODIUM LACTATE, POTASSIUM CHLORIDE, AND CALCIUM CHLORIDE SCH MLS/HR: .6; .31; .03; .02 INJECTION, SOLUTION INTRAVENOUS at 22:31

## 2023-03-01 RX ADMIN — DEXTROSE MONOHYDRATE SCH MLS/HR: 50 INJECTION, SOLUTION INTRAVENOUS at 17:01

## 2023-03-01 RX ADMIN — Medication SCH GM: at 09:01

## 2023-03-01 RX ADMIN — PIPERACILLIN SODIUM AND TAZOBACTAM SODIUM SCH MLS/HR: .375; 3 INJECTION, POWDER, LYOPHILIZED, FOR SOLUTION INTRAVENOUS at 06:00

## 2023-03-01 RX ADMIN — LOSARTAN POTASSIUM SCH MG: 50 TABLET, FILM COATED ORAL at 09:01

## 2023-03-01 RX ADMIN — CHLORHEXIDINE GLUCONATE 0.12% ORAL RINSE SCH ML: 1.2 LIQUID ORAL at 16:18

## 2023-03-01 RX ADMIN — SODIUM CHLORIDE SCH MG: 9 INJECTION, SOLUTION INTRAVENOUS at 08:45

## 2023-03-01 RX ADMIN — Medication SCH OZ: at 09:02

## 2023-03-01 RX ADMIN — POTASSIUM CHLORIDE SCH MEQ: 1.5 POWDER, FOR SOLUTION ORAL at 11:17

## 2023-03-01 RX ADMIN — Medication SCH EACH: at 12:14

## 2023-03-01 RX ADMIN — LOSARTAN POTASSIUM SCH MG: 50 TABLET, FILM COATED ORAL at 16:18

## 2023-03-01 RX ADMIN — POTASSIUM CHLORIDE SCH MEQ: 1.5 POWDER, FOR SOLUTION ORAL at 11:35

## 2023-03-01 RX ADMIN — AMLODIPINE BESYLATE SCH MG: 5 TABLET ORAL at 09:01

## 2023-03-01 RX ADMIN — CHLORHEXIDINE GLUCONATE 0.12% ORAL RINSE SCH ML: 1.2 LIQUID ORAL at 08:41

## 2023-03-01 RX ADMIN — ATORVASTATIN CALCIUM SCH MG: 40 TABLET, FILM COATED ORAL at 21:22

## 2023-03-01 RX ADMIN — SODIUM CHLORIDE, SODIUM LACTATE, POTASSIUM CHLORIDE, AND CALCIUM CHLORIDE SCH MLS/HR: .6; .31; .03; .02 INJECTION, SOLUTION INTRAVENOUS at 12:21

## 2023-03-01 RX ADMIN — PIPERACILLIN SODIUM AND TAZOBACTAM SODIUM SCH MLS/HR: .375; 3 INJECTION, POWDER, LYOPHILIZED, FOR SOLUTION INTRAVENOUS at 11:35

## 2023-03-01 RX ADMIN — PIPERACILLIN SODIUM AND TAZOBACTAM SODIUM SCH MLS/HR: .375; 3 INJECTION, POWDER, LYOPHILIZED, FOR SOLUTION INTRAVENOUS at 18:28

## 2023-03-01 RX ADMIN — Medication SCH EACH: at 05:32

## 2023-03-01 RX ADMIN — Medication SCH EACH: at 23:12

## 2023-03-01 RX ADMIN — POTASSIUM CHLORIDE SCH MEQ: 1.5 POWDER, FOR SOLUTION ORAL at 10:35

## 2023-03-01 RX ADMIN — Medication SCH MG: at 08:42

## 2023-03-01 RX ADMIN — ACETAMINOPHEN SCH MG: 160 SOLUTION ORAL at 08:42

## 2023-03-01 RX ADMIN — QUETIAPINE SCH MG: 25 TABLET, FILM COATED ORAL at 21:22

## 2023-03-01 RX ADMIN — TRAMADOL HYDROCHLORIDE SCH MG: 50 TABLET, FILM COATED ORAL at 16:17

## 2023-03-01 RX ADMIN — Medication SCH EACH: at 17:27

## 2023-03-01 RX ADMIN — TRAMADOL HYDROCHLORIDE SCH MG: 50 TABLET, FILM COATED ORAL at 08:43

## 2023-03-01 RX ADMIN — PIPERACILLIN SODIUM AND TAZOBACTAM SODIUM SCH MLS/HR: .375; 3 INJECTION, POWDER, LYOPHILIZED, FOR SOLUTION INTRAVENOUS at 23:24

## 2023-03-01 RX ADMIN — INSULIN HUMAN PRN UNITS: 100 INJECTION, SOLUTION PARENTERAL at 00:09

## 2023-03-01 RX ADMIN — INSULIN HUMAN PRN UNITS: 100 INJECTION, SOLUTION PARENTERAL at 12:37

## 2023-03-02 VITALS — DIASTOLIC BLOOD PRESSURE: 76 MMHG | SYSTOLIC BLOOD PRESSURE: 111 MMHG

## 2023-03-02 VITALS — DIASTOLIC BLOOD PRESSURE: 52 MMHG | SYSTOLIC BLOOD PRESSURE: 111 MMHG

## 2023-03-02 VITALS — DIASTOLIC BLOOD PRESSURE: 44 MMHG | SYSTOLIC BLOOD PRESSURE: 98 MMHG

## 2023-03-02 VITALS — SYSTOLIC BLOOD PRESSURE: 100 MMHG | DIASTOLIC BLOOD PRESSURE: 59 MMHG

## 2023-03-02 VITALS — DIASTOLIC BLOOD PRESSURE: 66 MMHG | SYSTOLIC BLOOD PRESSURE: 106 MMHG

## 2023-03-02 VITALS — SYSTOLIC BLOOD PRESSURE: 123 MMHG | DIASTOLIC BLOOD PRESSURE: 51 MMHG

## 2023-03-02 VITALS — DIASTOLIC BLOOD PRESSURE: 54 MMHG | SYSTOLIC BLOOD PRESSURE: 106 MMHG

## 2023-03-02 VITALS — SYSTOLIC BLOOD PRESSURE: 94 MMHG | DIASTOLIC BLOOD PRESSURE: 49 MMHG

## 2023-03-02 VITALS — DIASTOLIC BLOOD PRESSURE: 53 MMHG | SYSTOLIC BLOOD PRESSURE: 110 MMHG

## 2023-03-02 LAB
BASOPHILS # BLD AUTO: 0 K/UL (ref 0–0.2)
BASOPHILS NFR BLD AUTO: 0.2 % (ref 0–2)
BASOPHILS NFR BLD MANUAL: 0 % (ref 0–2)
BUN SERPL-MCNC: 28 MG/DL (ref 7–18)
CALCIUM SERPL-MCNC: 8.4 MG/DL (ref 8.5–10.1)
CHLORIDE SERPL-SCNC: 116 MMOL/L (ref 98–107)
CO2 SERPL-SCNC: 24 MMOL/L (ref 21–32)
CREAT SERPL-MCNC: 0.7 MG/DL (ref 0.6–1.3)
EOSINOPHIL NFR BLD AUTO: 2.1 % (ref 0–6)
EOSINOPHIL NFR BLD MANUAL: 2 % (ref 0–4)
GLUCOSE SERPL-MCNC: 195 MG/DL (ref 74–106)
HCT VFR BLD AUTO: 20 % (ref 33–45)
HGB BLD-MCNC: 6.3 G/DL (ref 11.5–14.8)
LYMPHOCYTES NFR BLD AUTO: 1.3 K/UL (ref 0.8–4.8)
LYMPHOCYTES NFR BLD AUTO: 7.7 % (ref 20–44)
LYMPHOCYTES NFR BLD MANUAL: 6 % (ref 16–48)
MAGNESIUM SERPL-MCNC: 1.8 MG/DL (ref 1.8–2.4)
MCHC RBC AUTO-ENTMCNC: 31 G/DL (ref 31–36)
MCV RBC AUTO: 89 FL (ref 82–100)
MONOCYTES NFR BLD AUTO: 1.6 K/UL (ref 0.1–1.3)
MONOCYTES NFR BLD AUTO: 9.4 % (ref 2–12)
MONOCYTES NFR BLD MANUAL: 9 % (ref 0–11)
NEUTROPHILS # BLD AUTO: 14 K/UL (ref 1.8–8.9)
NEUTROPHILS NFR BLD AUTO: 80.6 % (ref 43–81)
NEUTS BAND NFR BLD MANUAL: 2 % (ref 0–5)
NEUTS SEG NFR BLD MANUAL: 81 % (ref 42–76)
PLATELET # BLD AUTO: 247 K/UL (ref 150–450)
POTASSIUM SERPL-SCNC: 3.9 MMOL/L (ref 3.5–5.1)
RBC # BLD AUTO: 2.3 MIL/UL (ref 4–5.2)
SODIUM SERPL-SCNC: 148 MMOL/L (ref 136–145)
WBC NRBC COR # BLD AUTO: 17.4 K/UL (ref 4.3–11)

## 2023-03-02 PROCEDURE — 30233N1 TRANSFUSION OF NONAUTOLOGOUS RED BLOOD CELLS INTO PERIPHERAL VEIN, PERCUTANEOUS APPROACH: ICD-10-PCS | Performed by: LEGAL MEDICINE

## 2023-03-02 RX ADMIN — CHLORHEXIDINE GLUCONATE 0.12% ORAL RINSE SCH ML: 1.2 LIQUID ORAL at 09:06

## 2023-03-02 RX ADMIN — Medication SCH EACH: at 06:00

## 2023-03-02 RX ADMIN — Medication SCH OZ: at 09:06

## 2023-03-02 RX ADMIN — PIPERACILLIN SODIUM AND TAZOBACTAM SODIUM SCH MLS/HR: .375; 3 INJECTION, POWDER, LYOPHILIZED, FOR SOLUTION INTRAVENOUS at 11:50

## 2023-03-02 RX ADMIN — DEXTROSE MONOHYDRATE SCH MLS/HR: 50 INJECTION, SOLUTION INTRAVENOUS at 05:03

## 2023-03-02 RX ADMIN — SODIUM CHLORIDE, SODIUM LACTATE, POTASSIUM CHLORIDE, AND CALCIUM CHLORIDE SCH MLS/HR: .6; .31; .03; .02 INJECTION, SOLUTION INTRAVENOUS at 09:05

## 2023-03-02 RX ADMIN — PIPERACILLIN SODIUM AND TAZOBACTAM SODIUM SCH MLS/HR: .375; 3 INJECTION, POWDER, LYOPHILIZED, FOR SOLUTION INTRAVENOUS at 05:03

## 2023-03-02 RX ADMIN — TRAMADOL HYDROCHLORIDE SCH MG: 50 TABLET, FILM COATED ORAL at 17:28

## 2023-03-02 RX ADMIN — AMLODIPINE BESYLATE SCH MG: 5 TABLET ORAL at 09:07

## 2023-03-02 RX ADMIN — ATORVASTATIN CALCIUM SCH MG: 40 TABLET, FILM COATED ORAL at 21:38

## 2023-03-02 RX ADMIN — Medication SCH EACH: at 17:29

## 2023-03-02 RX ADMIN — INSULIN HUMAN PRN UNITS: 100 INJECTION, SOLUTION PARENTERAL at 05:15

## 2023-03-02 RX ADMIN — TRAMADOL HYDROCHLORIDE SCH MG: 50 TABLET, FILM COATED ORAL at 09:07

## 2023-03-02 RX ADMIN — ACETAMINOPHEN SCH MG: 160 SOLUTION ORAL at 09:06

## 2023-03-02 RX ADMIN — Medication SCH MG: at 09:07

## 2023-03-02 RX ADMIN — PIPERACILLIN SODIUM AND TAZOBACTAM SODIUM SCH MLS/HR: .375; 3 INJECTION, POWDER, LYOPHILIZED, FOR SOLUTION INTRAVENOUS at 17:27

## 2023-03-02 RX ADMIN — LOSARTAN POTASSIUM SCH MG: 50 TABLET, FILM COATED ORAL at 17:00

## 2023-03-02 RX ADMIN — CHLORHEXIDINE GLUCONATE 0.12% ORAL RINSE SCH ML: 1.2 LIQUID ORAL at 17:27

## 2023-03-02 RX ADMIN — Medication SCH EACH: at 11:38

## 2023-03-02 RX ADMIN — SODIUM CHLORIDE, SODIUM LACTATE, POTASSIUM CHLORIDE, AND CALCIUM CHLORIDE SCH MLS/HR: .6; .31; .03; .02 INJECTION, SOLUTION INTRAVENOUS at 17:40

## 2023-03-02 RX ADMIN — Medication SCH GM: at 09:11

## 2023-03-02 RX ADMIN — SODIUM CHLORIDE SCH MG: 9 INJECTION, SOLUTION INTRAVENOUS at 09:05

## 2023-03-02 RX ADMIN — LOSARTAN POTASSIUM SCH MG: 50 TABLET, FILM COATED ORAL at 09:07

## 2023-03-02 RX ADMIN — Medication SCH OZ: at 09:12

## 2023-03-02 RX ADMIN — QUETIAPINE SCH MG: 25 TABLET, FILM COATED ORAL at 21:39

## 2023-03-02 RX ADMIN — DEXTROSE MONOHYDRATE SCH MLS/HR: 50 INJECTION, SOLUTION INTRAVENOUS at 17:40

## 2023-03-03 VITALS — DIASTOLIC BLOOD PRESSURE: 62 MMHG | SYSTOLIC BLOOD PRESSURE: 118 MMHG

## 2023-03-03 VITALS — SYSTOLIC BLOOD PRESSURE: 120 MMHG | DIASTOLIC BLOOD PRESSURE: 66 MMHG

## 2023-03-03 VITALS — SYSTOLIC BLOOD PRESSURE: 102 MMHG | DIASTOLIC BLOOD PRESSURE: 48 MMHG

## 2023-03-03 VITALS — SYSTOLIC BLOOD PRESSURE: 104 MMHG | DIASTOLIC BLOOD PRESSURE: 57 MMHG

## 2023-03-03 VITALS — SYSTOLIC BLOOD PRESSURE: 101 MMHG | DIASTOLIC BLOOD PRESSURE: 44 MMHG

## 2023-03-03 LAB
BASOPHILS # BLD AUTO: 0.1 K/UL (ref 0–0.2)
BASOPHILS NFR BLD AUTO: 0.3 % (ref 0–2)
BASOPHILS NFR BLD MANUAL: 0 % (ref 0–2)
BUN SERPL-MCNC: 20 MG/DL (ref 7–18)
CALCIUM SERPL-MCNC: 8.4 MG/DL (ref 8.5–10.1)
CHLORIDE SERPL-SCNC: 112 MMOL/L (ref 98–107)
CO2 SERPL-SCNC: 23 MMOL/L (ref 21–32)
CREAT SERPL-MCNC: 0.7 MG/DL (ref 0.6–1.3)
EOSINOPHIL NFR BLD AUTO: 6.6 % (ref 0–6)
EOSINOPHIL NFR BLD MANUAL: 4 % (ref 0–4)
GLUCOSE SERPL-MCNC: 146 MG/DL (ref 74–106)
HCT VFR BLD AUTO: 29 % (ref 33–45)
HCT VFR BLD AUTO: 30 % (ref 33–45)
HCT VFR BLD AUTO: 33 % (ref 33–45)
HGB BLD-MCNC: 10.3 G/DL (ref 11.5–14.8)
HGB BLD-MCNC: 9.2 G/DL (ref 11.5–14.8)
HGB BLD-MCNC: 9.5 G/DL (ref 11.5–14.8)
LYMPHOCYTES NFR BLD AUTO: 1.5 K/UL (ref 0.8–4.8)
LYMPHOCYTES NFR BLD AUTO: 7.4 % (ref 20–44)
LYMPHOCYTES NFR BLD MANUAL: 8 % (ref 16–48)
MAGNESIUM SERPL-MCNC: 1.9 MG/DL (ref 1.8–2.4)
MCHC RBC AUTO-ENTMCNC: 31 G/DL (ref 31–36)
MCV RBC AUTO: 89 FL (ref 82–100)
MONOCYTES NFR BLD AUTO: 1.4 K/UL (ref 0.1–1.3)
MONOCYTES NFR BLD AUTO: 6.9 % (ref 2–12)
MONOCYTES NFR BLD MANUAL: 6 % (ref 0–11)
NEUTROPHILS # BLD AUTO: 15.7 K/UL (ref 1.8–8.9)
NEUTROPHILS NFR BLD AUTO: 78.8 % (ref 43–81)
NEUTS BAND NFR BLD MANUAL: 3 % (ref 0–5)
NEUTS SEG NFR BLD MANUAL: 79 % (ref 42–76)
PLATELET # BLD AUTO: 252 K/UL (ref 150–450)
POTASSIUM SERPL-SCNC: 5 MMOL/L (ref 3.5–5.1)
RBC # BLD AUTO: 3.69 MIL/UL (ref 4–5.2)
SODIUM SERPL-SCNC: 141 MMOL/L (ref 136–145)
WBC NRBC COR # BLD AUTO: 19.9 K/UL (ref 4.3–11)

## 2023-03-03 RX ADMIN — Medication SCH EACH: at 13:00

## 2023-03-03 RX ADMIN — DEXTROSE MONOHYDRATE SCH MLS/HR: 50 INJECTION, SOLUTION INTRAVENOUS at 09:40

## 2023-03-03 RX ADMIN — DEXTROSE MONOHYDRATE SCH MLS/HR: 50 INJECTION, SOLUTION INTRAVENOUS at 17:45

## 2023-03-03 RX ADMIN — ACETAMINOPHEN SCH MG: 160 SOLUTION ORAL at 09:40

## 2023-03-03 RX ADMIN — Medication SCH EACH: at 17:40

## 2023-03-03 RX ADMIN — PIPERACILLIN SODIUM AND TAZOBACTAM SODIUM SCH MLS/HR: .375; 3 INJECTION, POWDER, LYOPHILIZED, FOR SOLUTION INTRAVENOUS at 13:01

## 2023-03-03 RX ADMIN — Medication SCH ML: at 08:41

## 2023-03-03 RX ADMIN — ATORVASTATIN CALCIUM SCH MG: 40 TABLET, FILM COATED ORAL at 21:32

## 2023-03-03 RX ADMIN — SODIUM CHLORIDE SCH MG: 9 INJECTION, SOLUTION INTRAVENOUS at 21:32

## 2023-03-03 RX ADMIN — Medication SCH GM: at 09:42

## 2023-03-03 RX ADMIN — Medication SCH MG: at 09:41

## 2023-03-03 RX ADMIN — Medication SCH OZ: at 09:42

## 2023-03-03 RX ADMIN — SODIUM CHLORIDE SCH MG: 9 INJECTION, SOLUTION INTRAVENOUS at 09:41

## 2023-03-03 RX ADMIN — SODIUM CHLORIDE, SODIUM LACTATE, POTASSIUM CHLORIDE, AND CALCIUM CHLORIDE SCH MLS/HR: .6; .31; .03; .02 INJECTION, SOLUTION INTRAVENOUS at 04:31

## 2023-03-03 RX ADMIN — AMLODIPINE BESYLATE SCH MG: 5 TABLET ORAL at 09:41

## 2023-03-03 RX ADMIN — PIPERACILLIN SODIUM AND TAZOBACTAM SODIUM SCH MLS/HR: .375; 3 INJECTION, POWDER, LYOPHILIZED, FOR SOLUTION INTRAVENOUS at 17:45

## 2023-03-03 RX ADMIN — LOSARTAN POTASSIUM SCH MG: 50 TABLET, FILM COATED ORAL at 17:00

## 2023-03-03 RX ADMIN — Medication SCH OZ: at 09:43

## 2023-03-03 RX ADMIN — TRAMADOL HYDROCHLORIDE SCH MG: 50 TABLET, FILM COATED ORAL at 17:45

## 2023-03-03 RX ADMIN — PIPERACILLIN SODIUM AND TAZOBACTAM SODIUM SCH MLS/HR: .375; 3 INJECTION, POWDER, LYOPHILIZED, FOR SOLUTION INTRAVENOUS at 05:17

## 2023-03-03 RX ADMIN — QUETIAPINE SCH MG: 25 TABLET, FILM COATED ORAL at 21:32

## 2023-03-03 RX ADMIN — LOSARTAN POTASSIUM SCH MG: 50 TABLET, FILM COATED ORAL at 09:41

## 2023-03-03 RX ADMIN — PIPERACILLIN SODIUM AND TAZOBACTAM SODIUM SCH MLS/HR: .375; 3 INJECTION, POWDER, LYOPHILIZED, FOR SOLUTION INTRAVENOUS at 00:18

## 2023-03-03 RX ADMIN — Medication SCH EACH: at 00:18

## 2023-03-03 RX ADMIN — CHLORHEXIDINE GLUCONATE 0.12% ORAL RINSE SCH ML: 1.2 LIQUID ORAL at 09:40

## 2023-03-03 RX ADMIN — Medication SCH EACH: at 06:17

## 2023-03-03 RX ADMIN — CHLORHEXIDINE GLUCONATE 0.12% ORAL RINSE SCH ML: 1.2 LIQUID ORAL at 17:45

## 2023-03-03 RX ADMIN — TRAMADOL HYDROCHLORIDE SCH MG: 50 TABLET, FILM COATED ORAL at 09:41

## 2023-03-04 VITALS — SYSTOLIC BLOOD PRESSURE: 123 MMHG | DIASTOLIC BLOOD PRESSURE: 58 MMHG

## 2023-03-04 VITALS — SYSTOLIC BLOOD PRESSURE: 98 MMHG | DIASTOLIC BLOOD PRESSURE: 56 MMHG

## 2023-03-04 VITALS — DIASTOLIC BLOOD PRESSURE: 58 MMHG | SYSTOLIC BLOOD PRESSURE: 108 MMHG

## 2023-03-04 VITALS — DIASTOLIC BLOOD PRESSURE: 61 MMHG | SYSTOLIC BLOOD PRESSURE: 105 MMHG

## 2023-03-04 VITALS — SYSTOLIC BLOOD PRESSURE: 103 MMHG | DIASTOLIC BLOOD PRESSURE: 48 MMHG

## 2023-03-04 VITALS — SYSTOLIC BLOOD PRESSURE: 122 MMHG | DIASTOLIC BLOOD PRESSURE: 66 MMHG

## 2023-03-04 LAB
BASOPHILS # BLD AUTO: 0 K/UL (ref 0–0.2)
BASOPHILS NFR BLD AUTO: 0.3 % (ref 0–2)
BUN SERPL-MCNC: 23 MG/DL (ref 7–18)
CALCIUM SERPL-MCNC: 8.4 MG/DL (ref 8.5–10.1)
CHLORIDE SERPL-SCNC: 111 MMOL/L (ref 98–107)
CO2 SERPL-SCNC: 24 MMOL/L (ref 21–32)
CREAT SERPL-MCNC: 0.7 MG/DL (ref 0.6–1.3)
EOSINOPHIL NFR BLD AUTO: 6.8 % (ref 0–6)
EOSINOPHIL NFR BLD MANUAL: 8 % (ref 0–4)
GLUCOSE SERPL-MCNC: 151 MG/DL (ref 74–106)
HCT VFR BLD AUTO: 29 % (ref 33–45)
HCT VFR BLD AUTO: 30 % (ref 33–45)
HCT VFR BLD AUTO: 30 % (ref 33–45)
HGB BLD-MCNC: 9.3 G/DL (ref 11.5–14.8)
HGB BLD-MCNC: 9.5 G/DL (ref 11.5–14.8)
HGB BLD-MCNC: 9.6 G/DL (ref 11.5–14.8)
LYMPHOCYTES NFR BLD AUTO: 1 K/UL (ref 0.8–4.8)
LYMPHOCYTES NFR BLD AUTO: 7.5 % (ref 20–44)
LYMPHOCYTES NFR BLD MANUAL: 6 % (ref 16–48)
MAGNESIUM SERPL-MCNC: 2 MG/DL (ref 1.8–2.4)
MCHC RBC AUTO-ENTMCNC: 32 G/DL (ref 31–36)
MCV RBC AUTO: 87 FL (ref 82–100)
MONOCYTES NFR BLD AUTO: 0.8 K/UL (ref 0.1–1.3)
MONOCYTES NFR BLD AUTO: 6.5 % (ref 2–12)
MONOCYTES NFR BLD MANUAL: 4 % (ref 0–11)
NEUTROPHILS # BLD AUTO: 10.4 K/UL (ref 1.8–8.9)
NEUTROPHILS NFR BLD AUTO: 78.9 % (ref 43–81)
NEUTS BAND NFR BLD MANUAL: 2 % (ref 0–5)
NEUTS SEG NFR BLD MANUAL: 80 % (ref 42–76)
PLATELET # BLD AUTO: 246 K/UL (ref 150–450)
POTASSIUM SERPL-SCNC: 4 MMOL/L (ref 3.5–5.1)
RBC # BLD AUTO: 3.3 MIL/UL (ref 4–5.2)
SODIUM SERPL-SCNC: 142 MMOL/L (ref 136–145)
WBC NRBC COR # BLD AUTO: 13.1 K/UL (ref 4.3–11)

## 2023-03-04 RX ADMIN — QUETIAPINE SCH MG: 25 TABLET, FILM COATED ORAL at 21:23

## 2023-03-04 RX ADMIN — PIPERACILLIN SODIUM AND TAZOBACTAM SODIUM SCH MLS/HR: .375; 3 INJECTION, POWDER, LYOPHILIZED, FOR SOLUTION INTRAVENOUS at 12:08

## 2023-03-04 RX ADMIN — PIPERACILLIN SODIUM AND TAZOBACTAM SODIUM SCH MLS/HR: .375; 3 INJECTION, POWDER, LYOPHILIZED, FOR SOLUTION INTRAVENOUS at 21:22

## 2023-03-04 RX ADMIN — PIPERACILLIN SODIUM AND TAZOBACTAM SODIUM SCH MLS/HR: .375; 3 INJECTION, POWDER, LYOPHILIZED, FOR SOLUTION INTRAVENOUS at 05:07

## 2023-03-04 RX ADMIN — ACETAMINOPHEN SCH MG: 160 SOLUTION ORAL at 08:23

## 2023-03-04 RX ADMIN — DEXTROSE MONOHYDRATE SCH MLS/HR: 50 INJECTION, SOLUTION INTRAVENOUS at 05:10

## 2023-03-04 RX ADMIN — DEXTROSE MONOHYDRATE SCH MLS/HR: 50 INJECTION, SOLUTION INTRAVENOUS at 17:03

## 2023-03-04 RX ADMIN — Medication SCH OZ: at 08:29

## 2023-03-04 RX ADMIN — INSULIN HUMAN PRN UNITS: 100 INJECTION, SOLUTION PARENTERAL at 23:20

## 2023-03-04 RX ADMIN — TRAMADOL HYDROCHLORIDE SCH MG: 50 TABLET, FILM COATED ORAL at 08:24

## 2023-03-04 RX ADMIN — ATORVASTATIN CALCIUM SCH MG: 40 TABLET, FILM COATED ORAL at 21:24

## 2023-03-04 RX ADMIN — Medication SCH GM: at 08:28

## 2023-03-04 RX ADMIN — TRAMADOL HYDROCHLORIDE SCH MG: 50 TABLET, FILM COATED ORAL at 16:53

## 2023-03-04 RX ADMIN — INSULIN HUMAN PRN UNITS: 100 INJECTION, SOLUTION PARENTERAL at 01:31

## 2023-03-04 RX ADMIN — Medication SCH OZ: at 08:28

## 2023-03-04 RX ADMIN — Medication SCH EACH: at 17:03

## 2023-03-04 RX ADMIN — INSULIN HUMAN PRN UNITS: 100 INJECTION, SOLUTION PARENTERAL at 05:11

## 2023-03-04 RX ADMIN — SODIUM CHLORIDE SCH MG: 9 INJECTION, SOLUTION INTRAVENOUS at 21:22

## 2023-03-04 RX ADMIN — LOSARTAN POTASSIUM SCH MG: 50 TABLET, FILM COATED ORAL at 08:28

## 2023-03-04 RX ADMIN — Medication SCH EACH: at 01:31

## 2023-03-04 RX ADMIN — PIPERACILLIN SODIUM AND TAZOBACTAM SODIUM SCH MLS/HR: .375; 3 INJECTION, POWDER, LYOPHILIZED, FOR SOLUTION INTRAVENOUS at 01:27

## 2023-03-04 RX ADMIN — CHLORHEXIDINE GLUCONATE 0.12% ORAL RINSE SCH ML: 1.2 LIQUID ORAL at 08:24

## 2023-03-04 RX ADMIN — Medication SCH EACH: at 11:45

## 2023-03-04 RX ADMIN — Medication SCH MG: at 08:28

## 2023-03-04 RX ADMIN — SODIUM CHLORIDE SCH MG: 9 INJECTION, SOLUTION INTRAVENOUS at 08:28

## 2023-03-04 RX ADMIN — Medication SCH EACH: at 23:19

## 2023-03-04 RX ADMIN — AMLODIPINE BESYLATE SCH MG: 5 TABLET ORAL at 08:24

## 2023-03-04 RX ADMIN — CHLORHEXIDINE GLUCONATE 0.12% ORAL RINSE SCH ML: 1.2 LIQUID ORAL at 16:52

## 2023-03-04 RX ADMIN — Medication SCH EACH: at 05:10

## 2023-03-04 RX ADMIN — LOSARTAN POTASSIUM SCH MG: 50 TABLET, FILM COATED ORAL at 16:53

## 2023-03-04 RX ADMIN — Medication SCH ML: at 05:44

## 2023-03-05 VITALS — SYSTOLIC BLOOD PRESSURE: 116 MMHG | DIASTOLIC BLOOD PRESSURE: 59 MMHG

## 2023-03-05 VITALS — DIASTOLIC BLOOD PRESSURE: 52 MMHG | SYSTOLIC BLOOD PRESSURE: 127 MMHG

## 2023-03-05 VITALS — SYSTOLIC BLOOD PRESSURE: 112 MMHG | DIASTOLIC BLOOD PRESSURE: 62 MMHG

## 2023-03-05 VITALS — DIASTOLIC BLOOD PRESSURE: 60 MMHG | SYSTOLIC BLOOD PRESSURE: 115 MMHG

## 2023-03-05 LAB
BASOPHILS # BLD AUTO: 0 K/UL (ref 0–0.2)
BASOPHILS NFR BLD AUTO: 0.2 % (ref 0–2)
BUN SERPL-MCNC: 24 MG/DL (ref 7–18)
CALCIUM SERPL-MCNC: 8.9 MG/DL (ref 8.5–10.1)
CHLORIDE SERPL-SCNC: 110 MMOL/L (ref 98–107)
CO2 SERPL-SCNC: 23 MMOL/L (ref 21–32)
CREAT SERPL-MCNC: 0.8 MG/DL (ref 0.6–1.3)
EOSINOPHIL NFR BLD AUTO: 3.8 % (ref 0–6)
GLUCOSE SERPL-MCNC: 130 MG/DL (ref 74–106)
HCT VFR BLD AUTO: 30 % (ref 33–45)
HCT VFR BLD AUTO: 34 % (ref 33–45)
HGB BLD-MCNC: 10.6 G/DL (ref 11.5–14.8)
HGB BLD-MCNC: 9.6 G/DL (ref 11.5–14.8)
LYMPHOCYTES NFR BLD AUTO: 1.2 K/UL (ref 0.8–4.8)
LYMPHOCYTES NFR BLD AUTO: 6.8 % (ref 20–44)
MAGNESIUM SERPL-MCNC: 2.3 MG/DL (ref 1.8–2.4)
MCHC RBC AUTO-ENTMCNC: 31 G/DL (ref 31–36)
MCV RBC AUTO: 91 FL (ref 82–100)
MONOCYTES NFR BLD AUTO: 1.4 K/UL (ref 0.1–1.3)
MONOCYTES NFR BLD AUTO: 7.9 % (ref 2–12)
NEUTROPHILS # BLD AUTO: 14.7 K/UL (ref 1.8–8.9)
NEUTROPHILS NFR BLD AUTO: 81.3 % (ref 43–81)
PHOSPHATE SERPL-MCNC: 2.8 MG/DL (ref 2.5–4.9)
PLATELET # BLD AUTO: 275 K/UL (ref 150–450)
POTASSIUM SERPL-SCNC: 4.3 MMOL/L (ref 3.5–5.1)
RBC # BLD AUTO: 3.75 MIL/UL (ref 4–5.2)
SODIUM SERPL-SCNC: 141 MMOL/L (ref 136–145)
WBC NRBC COR # BLD AUTO: 18.1 K/UL (ref 4.3–11)

## 2023-03-05 RX ADMIN — MUPIROCIN SCH APPLIC: 20 OINTMENT TOPICAL at 21:42

## 2023-03-05 RX ADMIN — ATORVASTATIN CALCIUM SCH MG: 40 TABLET, FILM COATED ORAL at 21:42

## 2023-03-05 RX ADMIN — CHLORHEXIDINE GLUCONATE 0.12% ORAL RINSE SCH ML: 1.2 LIQUID ORAL at 17:11

## 2023-03-05 RX ADMIN — AMLODIPINE BESYLATE SCH MG: 5 TABLET ORAL at 08:55

## 2023-03-05 RX ADMIN — TRAMADOL HYDROCHLORIDE SCH MG: 50 TABLET, FILM COATED ORAL at 17:11

## 2023-03-05 RX ADMIN — PIPERACILLIN SODIUM AND TAZOBACTAM SODIUM SCH MLS/HR: .375; 3 INJECTION, POWDER, LYOPHILIZED, FOR SOLUTION INTRAVENOUS at 04:55

## 2023-03-05 RX ADMIN — Medication SCH EACH: at 17:54

## 2023-03-05 RX ADMIN — Medication SCH OZ: at 08:56

## 2023-03-05 RX ADMIN — INSULIN HUMAN PRN UNITS: 100 INJECTION, SOLUTION PARENTERAL at 23:40

## 2023-03-05 RX ADMIN — DEXTROSE MONOHYDRATE SCH MLS/HR: 50 INJECTION, SOLUTION INTRAVENOUS at 06:17

## 2023-03-05 RX ADMIN — DOCUSATE SODIUM SCH MG: 50 LIQUID ORAL at 09:03

## 2023-03-05 RX ADMIN — SODIUM CHLORIDE SCH MG: 9 INJECTION, SOLUTION INTRAVENOUS at 08:55

## 2023-03-05 RX ADMIN — SODIUM CHLORIDE SCH MG: 9 INJECTION, SOLUTION INTRAVENOUS at 21:42

## 2023-03-05 RX ADMIN — LOSARTAN POTASSIUM SCH MG: 50 TABLET, FILM COATED ORAL at 08:54

## 2023-03-05 RX ADMIN — Medication SCH EACH: at 05:42

## 2023-03-05 RX ADMIN — PIPERACILLIN SODIUM AND TAZOBACTAM SODIUM SCH MLS/HR: .375; 3 INJECTION, POWDER, LYOPHILIZED, FOR SOLUTION INTRAVENOUS at 12:45

## 2023-03-05 RX ADMIN — TRAMADOL HYDROCHLORIDE SCH MG: 50 TABLET, FILM COATED ORAL at 08:55

## 2023-03-05 RX ADMIN — DEXTROSE MONOHYDRATE SCH MLS/HR: 50 INJECTION, SOLUTION INTRAVENOUS at 17:11

## 2023-03-05 RX ADMIN — Medication SCH OZ: at 08:55

## 2023-03-05 RX ADMIN — INSULIN HUMAN PRN UNITS: 100 INJECTION, SOLUTION PARENTERAL at 05:44

## 2023-03-05 RX ADMIN — LOSARTAN POTASSIUM SCH MG: 50 TABLET, FILM COATED ORAL at 17:11

## 2023-03-05 RX ADMIN — Medication SCH ML: at 08:56

## 2023-03-05 RX ADMIN — MEROPENEM SCH MLS/HR: 500 INJECTION INTRAVENOUS at 21:42

## 2023-03-05 RX ADMIN — Medication SCH EACH: at 23:40

## 2023-03-05 RX ADMIN — CHLORHEXIDINE GLUCONATE 0.12% ORAL RINSE SCH ML: 1.2 LIQUID ORAL at 08:55

## 2023-03-05 RX ADMIN — Medication SCH OZ: at 09:00

## 2023-03-05 RX ADMIN — QUETIAPINE SCH MG: 25 TABLET, FILM COATED ORAL at 21:42

## 2023-03-05 RX ADMIN — ACETAMINOPHEN SCH MG: 160 SOLUTION ORAL at 08:55

## 2023-03-05 RX ADMIN — Medication SCH GM: at 12:31

## 2023-03-05 RX ADMIN — Medication SCH EACH: at 12:25

## 2023-03-06 VITALS — DIASTOLIC BLOOD PRESSURE: 67 MMHG | SYSTOLIC BLOOD PRESSURE: 128 MMHG

## 2023-03-06 VITALS — DIASTOLIC BLOOD PRESSURE: 71 MMHG | SYSTOLIC BLOOD PRESSURE: 138 MMHG

## 2023-03-06 VITALS — DIASTOLIC BLOOD PRESSURE: 69 MMHG | SYSTOLIC BLOOD PRESSURE: 139 MMHG

## 2023-03-06 VITALS — SYSTOLIC BLOOD PRESSURE: 111 MMHG | DIASTOLIC BLOOD PRESSURE: 63 MMHG

## 2023-03-06 LAB
BASOPHILS # BLD AUTO: 0 K/UL (ref 0–0.2)
BASOPHILS NFR BLD AUTO: 0.1 % (ref 0–2)
BUN SERPL-MCNC: 22 MG/DL (ref 7–18)
CALCIUM SERPL-MCNC: 8.6 MG/DL (ref 8.5–10.1)
CHLORIDE SERPL-SCNC: 108 MMOL/L (ref 98–107)
CO2 SERPL-SCNC: 25 MMOL/L (ref 21–32)
CREAT SERPL-MCNC: 0.7 MG/DL (ref 0.6–1.3)
EOSINOPHIL NFR BLD AUTO: 3.3 % (ref 0–6)
EOSINOPHIL NFR BLD MANUAL: 1 % (ref 0–4)
GLUCOSE SERPL-MCNC: 169 MG/DL (ref 74–106)
HCT VFR BLD AUTO: 30 % (ref 33–45)
HCT VFR BLD AUTO: 31 % (ref 33–45)
HGB BLD-MCNC: 9.3 G/DL (ref 11.5–14.8)
HGB BLD-MCNC: 9.9 G/DL (ref 11.5–14.8)
LYMPHOCYTES NFR BLD AUTO: 1.2 K/UL (ref 0.8–4.8)
LYMPHOCYTES NFR BLD AUTO: 8.4 % (ref 20–44)
LYMPHOCYTES NFR BLD MANUAL: 4 % (ref 16–48)
MAGNESIUM SERPL-MCNC: 1.9 MG/DL (ref 1.8–2.4)
MCHC RBC AUTO-ENTMCNC: 32 G/DL (ref 31–36)
MCV RBC AUTO: 89 FL (ref 82–100)
METAMYELOCYTES NFR BLD MANUAL: 1 % (ref 0–0)
MONOCYTES NFR BLD AUTO: 1.3 K/UL (ref 0.1–1.3)
MONOCYTES NFR BLD AUTO: 9 % (ref 2–12)
MONOCYTES NFR BLD MANUAL: 2 % (ref 0–11)
MYELOCYTES NFR BLD MANUAL: 1 % (ref 0–0)
NEUTROPHILS # BLD AUTO: 11.4 K/UL (ref 1.8–8.9)
NEUTROPHILS NFR BLD AUTO: 79.2 % (ref 43–81)
NEUTS BAND NFR BLD MANUAL: 4 % (ref 0–5)
NEUTS SEG NFR BLD MANUAL: 87 % (ref 42–76)
PHOSPHATE SERPL-MCNC: 2.4 MG/DL (ref 2.5–4.9)
PLATELET # BLD AUTO: 323 K/UL (ref 150–450)
POTASSIUM SERPL-SCNC: 3.8 MMOL/L (ref 3.5–5.1)
RBC # BLD AUTO: 3.31 MIL/UL (ref 4–5.2)
SODIUM SERPL-SCNC: 139 MMOL/L (ref 136–145)
WBC NRBC COR # BLD AUTO: 14.3 K/UL (ref 4.3–11)

## 2023-03-06 RX ADMIN — CHLORHEXIDINE GLUCONATE 0.12% ORAL RINSE SCH ML: 1.2 LIQUID ORAL at 09:25

## 2023-03-06 RX ADMIN — CHLORHEXIDINE GLUCONATE 0.12% ORAL RINSE SCH ML: 1.2 LIQUID ORAL at 17:02

## 2023-03-06 RX ADMIN — Medication SCH EACH: at 17:03

## 2023-03-06 RX ADMIN — AMLODIPINE BESYLATE SCH MG: 5 TABLET ORAL at 09:12

## 2023-03-06 RX ADMIN — TRAMADOL HYDROCHLORIDE SCH MG: 50 TABLET, FILM COATED ORAL at 17:18

## 2023-03-06 RX ADMIN — MEROPENEM SCH MLS/HR: 500 INJECTION INTRAVENOUS at 09:32

## 2023-03-06 RX ADMIN — LOSARTAN POTASSIUM SCH MG: 50 TABLET, FILM COATED ORAL at 09:11

## 2023-03-06 RX ADMIN — ATORVASTATIN CALCIUM SCH MG: 40 TABLET, FILM COATED ORAL at 21:11

## 2023-03-06 RX ADMIN — MUPIROCIN SCH APPLIC: 20 OINTMENT TOPICAL at 09:14

## 2023-03-06 RX ADMIN — ACETAMINOPHEN SCH MG: 160 SOLUTION ORAL at 09:12

## 2023-03-06 RX ADMIN — QUETIAPINE SCH MG: 25 TABLET, FILM COATED ORAL at 21:10

## 2023-03-06 RX ADMIN — DEXTROSE MONOHYDRATE SCH MLS/HR: 50 INJECTION, SOLUTION INTRAVENOUS at 17:02

## 2023-03-06 RX ADMIN — TRAMADOL HYDROCHLORIDE SCH MG: 50 TABLET, FILM COATED ORAL at 09:14

## 2023-03-06 RX ADMIN — Medication SCH OZ: at 09:15

## 2023-03-06 RX ADMIN — INSULIN HUMAN PRN UNITS: 100 INJECTION, SOLUTION PARENTERAL at 05:45

## 2023-03-06 RX ADMIN — Medication SCH ML: at 14:02

## 2023-03-06 RX ADMIN — DOCUSATE SODIUM SCH MG: 50 LIQUID ORAL at 09:13

## 2023-03-06 RX ADMIN — Medication SCH GM: at 09:15

## 2023-03-06 RX ADMIN — DEXTROSE MONOHYDRATE SCH MLS/HR: 50 INJECTION, SOLUTION INTRAVENOUS at 05:29

## 2023-03-06 RX ADMIN — SODIUM CHLORIDE SCH MG: 9 INJECTION, SOLUTION INTRAVENOUS at 09:00

## 2023-03-06 RX ADMIN — Medication SCH EACH: at 05:42

## 2023-03-06 RX ADMIN — MEROPENEM SCH MLS/HR: 500 INJECTION INTRAVENOUS at 21:02

## 2023-03-06 RX ADMIN — INSULIN HUMAN PRN UNITS: 100 INJECTION, SOLUTION PARENTERAL at 13:04

## 2023-03-06 RX ADMIN — LOSARTAN POTASSIUM SCH MG: 50 TABLET, FILM COATED ORAL at 16:48

## 2023-03-06 RX ADMIN — MUPIROCIN SCH APPLIC: 20 OINTMENT TOPICAL at 21:05

## 2023-03-06 RX ADMIN — SODIUM CHLORIDE SCH MG: 9 INJECTION, SOLUTION INTRAVENOUS at 09:12

## 2023-03-06 RX ADMIN — INSULIN HUMAN PRN UNITS: 100 INJECTION, SOLUTION PARENTERAL at 17:03

## 2023-03-06 RX ADMIN — Medication SCH EACH: at 13:03

## 2023-03-06 RX ADMIN — Medication SCH OZ: at 09:14

## 2023-03-07 VITALS — DIASTOLIC BLOOD PRESSURE: 72 MMHG | SYSTOLIC BLOOD PRESSURE: 127 MMHG

## 2023-03-07 VITALS — SYSTOLIC BLOOD PRESSURE: 116 MMHG | DIASTOLIC BLOOD PRESSURE: 66 MMHG

## 2023-03-07 VITALS — DIASTOLIC BLOOD PRESSURE: 60 MMHG | SYSTOLIC BLOOD PRESSURE: 133 MMHG

## 2023-03-07 LAB
BASOPHILS # BLD AUTO: 0 K/UL (ref 0–0.2)
BASOPHILS NFR BLD AUTO: 0.3 % (ref 0–2)
BUN SERPL-MCNC: 25 MG/DL (ref 7–18)
CALCIUM SERPL-MCNC: 9.1 MG/DL (ref 8.5–10.1)
CHLORIDE SERPL-SCNC: 108 MMOL/L (ref 98–107)
CO2 SERPL-SCNC: 25 MMOL/L (ref 21–32)
CREAT SERPL-MCNC: 0.6 MG/DL (ref 0.6–1.3)
EOSINOPHIL NFR BLD AUTO: 2 % (ref 0–6)
GLUCOSE SERPL-MCNC: 155 MG/DL (ref 74–106)
HCT VFR BLD AUTO: 30 % (ref 33–45)
HCT VFR BLD AUTO: 30 % (ref 33–45)
HGB BLD-MCNC: 9.3 G/DL (ref 11.5–14.8)
HGB BLD-MCNC: 9.9 G/DL (ref 11.5–14.8)
LYMPHOCYTES NFR BLD AUTO: 1.4 K/UL (ref 0.8–4.8)
LYMPHOCYTES NFR BLD AUTO: 9.7 % (ref 20–44)
MAGNESIUM SERPL-MCNC: 2 MG/DL (ref 1.8–2.4)
MCHC RBC AUTO-ENTMCNC: 33 G/DL (ref 31–36)
MCV RBC AUTO: 90 FL (ref 82–100)
MONOCYTES NFR BLD AUTO: 1.4 K/UL (ref 0.1–1.3)
MONOCYTES NFR BLD AUTO: 9.7 % (ref 2–12)
NEUTROPHILS # BLD AUTO: 11.3 K/UL (ref 1.8–8.9)
NEUTROPHILS NFR BLD AUTO: 78.3 % (ref 43–81)
PHOSPHATE SERPL-MCNC: 3.2 MG/DL (ref 2.5–4.9)
PLATELET # BLD AUTO: 366 K/UL (ref 150–450)
POTASSIUM SERPL-SCNC: 4.2 MMOL/L (ref 3.5–5.1)
RBC # BLD AUTO: 3.36 MIL/UL (ref 4–5.2)
SODIUM SERPL-SCNC: 141 MMOL/L (ref 136–145)
WBC NRBC COR # BLD AUTO: 14.5 K/UL (ref 4.3–11)

## 2023-03-07 PROCEDURE — 0JB70ZZ EXCISION OF BACK SUBCUTANEOUS TISSUE AND FASCIA, OPEN APPROACH: ICD-10-PCS

## 2023-03-07 RX ADMIN — MEROPENEM SCH MLS/HR: 500 INJECTION INTRAVENOUS at 20:23

## 2023-03-07 RX ADMIN — Medication SCH OZ: at 08:24

## 2023-03-07 RX ADMIN — Medication SCH EACH: at 00:20

## 2023-03-07 RX ADMIN — LOSARTAN POTASSIUM SCH MG: 50 TABLET, FILM COATED ORAL at 17:07

## 2023-03-07 RX ADMIN — Medication SCH OZ: at 08:28

## 2023-03-07 RX ADMIN — INSULIN HUMAN PRN UNITS: 100 INJECTION, SOLUTION PARENTERAL at 07:07

## 2023-03-07 RX ADMIN — CHLORHEXIDINE GLUCONATE 0.12% ORAL RINSE SCH ML: 1.2 LIQUID ORAL at 08:16

## 2023-03-07 RX ADMIN — LOSARTAN POTASSIUM SCH MG: 50 TABLET, FILM COATED ORAL at 08:19

## 2023-03-07 RX ADMIN — DEXTROSE MONOHYDRATE SCH MLS/HR: 50 INJECTION, SOLUTION INTRAVENOUS at 06:32

## 2023-03-07 RX ADMIN — ACETAMINOPHEN SCH MG: 160 SOLUTION ORAL at 08:16

## 2023-03-07 RX ADMIN — CHLORHEXIDINE GLUCONATE 0.12% ORAL RINSE SCH ML: 1.2 LIQUID ORAL at 17:07

## 2023-03-07 RX ADMIN — Medication SCH OZ: at 08:23

## 2023-03-07 RX ADMIN — QUETIAPINE SCH MG: 25 TABLET, FILM COATED ORAL at 21:17

## 2023-03-07 RX ADMIN — SODIUM CHLORIDE SCH MG: 9 INJECTION, SOLUTION INTRAVENOUS at 08:19

## 2023-03-07 RX ADMIN — MUPIROCIN SCH APPLIC: 20 OINTMENT TOPICAL at 08:23

## 2023-03-07 RX ADMIN — Medication SCH EACH: at 06:31

## 2023-03-07 RX ADMIN — TRAMADOL HYDROCHLORIDE SCH MG: 50 TABLET, FILM COATED ORAL at 08:18

## 2023-03-07 RX ADMIN — Medication SCH EACH: at 17:07

## 2023-03-07 RX ADMIN — DOCUSATE SODIUM SCH MG: 50 LIQUID ORAL at 08:16

## 2023-03-07 RX ADMIN — ATORVASTATIN CALCIUM SCH MG: 40 TABLET, FILM COATED ORAL at 21:17

## 2023-03-07 RX ADMIN — AMLODIPINE BESYLATE SCH MG: 5 TABLET ORAL at 08:17

## 2023-03-07 RX ADMIN — MEROPENEM SCH MLS/HR: 500 INJECTION INTRAVENOUS at 09:46

## 2023-03-07 RX ADMIN — MUPIROCIN SCH APPLIC: 20 OINTMENT TOPICAL at 20:55

## 2023-03-07 RX ADMIN — ACETAMINOPHEN PRN MG: 325 TABLET ORAL at 20:33

## 2023-03-07 RX ADMIN — Medication SCH EACH: at 12:07

## 2023-03-07 RX ADMIN — Medication SCH GM: at 08:23

## 2023-03-07 RX ADMIN — TRAMADOL HYDROCHLORIDE SCH MG: 50 TABLET, FILM COATED ORAL at 17:07

## 2023-03-08 VITALS — DIASTOLIC BLOOD PRESSURE: 75 MMHG | SYSTOLIC BLOOD PRESSURE: 120 MMHG

## 2023-03-08 VITALS — SYSTOLIC BLOOD PRESSURE: 100 MMHG | DIASTOLIC BLOOD PRESSURE: 53 MMHG

## 2023-03-08 VITALS — DIASTOLIC BLOOD PRESSURE: 74 MMHG | SYSTOLIC BLOOD PRESSURE: 130 MMHG

## 2023-03-08 LAB
BASOPHILS # BLD AUTO: 0 K/UL (ref 0–0.2)
BASOPHILS NFR BLD AUTO: 0.4 % (ref 0–2)
BUN SERPL-MCNC: 29 MG/DL (ref 7–18)
CALCIUM SERPL-MCNC: 9.4 MG/DL (ref 8.5–10.1)
CHLORIDE SERPL-SCNC: 111 MMOL/L (ref 98–107)
CO2 SERPL-SCNC: 26 MMOL/L (ref 21–32)
CREAT SERPL-MCNC: 0.6 MG/DL (ref 0.6–1.3)
EOSINOPHIL NFR BLD AUTO: 3.3 % (ref 0–6)
GLUCOSE SERPL-MCNC: 132 MG/DL (ref 74–106)
HCT VFR BLD AUTO: 30 % (ref 33–45)
HCT VFR BLD AUTO: 30 % (ref 33–45)
HGB BLD-MCNC: 9.4 G/DL (ref 11.5–14.8)
HGB BLD-MCNC: 9.6 G/DL (ref 11.5–14.8)
LYMPHOCYTES NFR BLD AUTO: 1.5 K/UL (ref 0.8–4.8)
LYMPHOCYTES NFR BLD AUTO: 11.2 % (ref 20–44)
MCHC RBC AUTO-ENTMCNC: 32 G/DL (ref 31–36)
MCV RBC AUTO: 90 FL (ref 82–100)
MONOCYTES NFR BLD AUTO: 1.1 K/UL (ref 0.1–1.3)
MONOCYTES NFR BLD AUTO: 7.9 % (ref 2–12)
NEUTROPHILS # BLD AUTO: 10.5 K/UL (ref 1.8–8.9)
NEUTROPHILS NFR BLD AUTO: 77.2 % (ref 43–81)
PLATELET # BLD AUTO: 374 K/UL (ref 150–450)
POTASSIUM SERPL-SCNC: 4.1 MMOL/L (ref 3.5–5.1)
RBC # BLD AUTO: 3.32 MIL/UL (ref 4–5.2)
SODIUM SERPL-SCNC: 143 MMOL/L (ref 136–145)
WBC NRBC COR # BLD AUTO: 13.5 K/UL (ref 4.3–11)

## 2023-03-08 RX ADMIN — INSULIN HUMAN PRN UNITS: 100 INJECTION, SOLUTION PARENTERAL at 06:52

## 2023-03-08 RX ADMIN — DOCUSATE SODIUM SCH MG: 50 LIQUID ORAL at 08:39

## 2023-03-08 RX ADMIN — Medication SCH OZ: at 08:44

## 2023-03-08 RX ADMIN — AMLODIPINE BESYLATE SCH MG: 5 TABLET ORAL at 08:39

## 2023-03-08 RX ADMIN — ACETAMINOPHEN SCH MG: 160 SOLUTION ORAL at 08:45

## 2023-03-08 RX ADMIN — ATORVASTATIN CALCIUM SCH MG: 40 TABLET, FILM COATED ORAL at 21:48

## 2023-03-08 RX ADMIN — MEROPENEM SCH MLS/HR: 500 INJECTION INTRAVENOUS at 08:45

## 2023-03-08 RX ADMIN — Medication SCH GM: at 08:44

## 2023-03-08 RX ADMIN — MUPIROCIN SCH APPLIC: 20 OINTMENT TOPICAL at 08:43

## 2023-03-08 RX ADMIN — Medication SCH EACH: at 11:15

## 2023-03-08 RX ADMIN — LOSARTAN POTASSIUM SCH MG: 50 TABLET, FILM COATED ORAL at 08:38

## 2023-03-08 RX ADMIN — Medication SCH EACH: at 00:27

## 2023-03-08 RX ADMIN — MUPIROCIN SCH APPLIC: 20 OINTMENT TOPICAL at 21:49

## 2023-03-08 RX ADMIN — Medication SCH ML: at 00:10

## 2023-03-08 RX ADMIN — LOSARTAN POTASSIUM SCH MG: 50 TABLET, FILM COATED ORAL at 16:14

## 2023-03-08 RX ADMIN — TRAMADOL HYDROCHLORIDE SCH MG: 50 TABLET, FILM COATED ORAL at 08:43

## 2023-03-08 RX ADMIN — CHLORHEXIDINE GLUCONATE 0.12% ORAL RINSE SCH ML: 1.2 LIQUID ORAL at 08:39

## 2023-03-08 RX ADMIN — Medication SCH OZ: at 08:45

## 2023-03-08 RX ADMIN — CHLORHEXIDINE GLUCONATE 0.12% ORAL RINSE SCH ML: 1.2 LIQUID ORAL at 16:13

## 2023-03-08 RX ADMIN — TRAMADOL HYDROCHLORIDE SCH MG: 50 TABLET, FILM COATED ORAL at 16:13

## 2023-03-08 RX ADMIN — Medication SCH EACH: at 17:12

## 2023-03-08 RX ADMIN — Medication SCH OZ: at 08:43

## 2023-03-08 RX ADMIN — Medication SCH EACH: at 06:10

## 2023-03-08 RX ADMIN — SODIUM CHLORIDE SCH MG: 9 INJECTION, SOLUTION INTRAVENOUS at 08:40

## 2023-03-08 RX ADMIN — ACETAMINOPHEN PRN MG: 325 TABLET ORAL at 08:40

## 2023-03-08 RX ADMIN — MEROPENEM SCH MLS/HR: 500 INJECTION INTRAVENOUS at 21:48

## 2023-03-08 RX ADMIN — QUETIAPINE SCH MG: 25 TABLET, FILM COATED ORAL at 21:49

## 2023-03-09 VITALS — SYSTOLIC BLOOD PRESSURE: 133 MMHG | DIASTOLIC BLOOD PRESSURE: 60 MMHG

## 2023-03-09 VITALS — SYSTOLIC BLOOD PRESSURE: 134 MMHG | DIASTOLIC BLOOD PRESSURE: 72 MMHG

## 2023-03-09 VITALS — SYSTOLIC BLOOD PRESSURE: 118 MMHG | DIASTOLIC BLOOD PRESSURE: 68 MMHG

## 2023-03-09 VITALS — SYSTOLIC BLOOD PRESSURE: 124 MMHG | DIASTOLIC BLOOD PRESSURE: 57 MMHG

## 2023-03-09 VITALS — SYSTOLIC BLOOD PRESSURE: 111 MMHG | DIASTOLIC BLOOD PRESSURE: 63 MMHG

## 2023-03-09 VITALS — DIASTOLIC BLOOD PRESSURE: 78 MMHG | SYSTOLIC BLOOD PRESSURE: 126 MMHG

## 2023-03-09 LAB
BASOPHILS # BLD AUTO: 0 K/UL (ref 0–0.2)
BASOPHILS NFR BLD AUTO: 0.3 % (ref 0–2)
BUN SERPL-MCNC: 22 MG/DL (ref 7–18)
CALCIUM SERPL-MCNC: 9.5 MG/DL (ref 8.5–10.1)
CHLORIDE SERPL-SCNC: 109 MMOL/L (ref 98–107)
CO2 SERPL-SCNC: 26 MMOL/L (ref 21–32)
CREAT SERPL-MCNC: 0.6 MG/DL (ref 0.6–1.3)
EOSINOPHIL NFR BLD AUTO: 2.3 % (ref 0–6)
GLUCOSE SERPL-MCNC: 112 MG/DL (ref 74–106)
HCT VFR BLD AUTO: 31 % (ref 33–45)
HCT VFR BLD AUTO: 32 % (ref 33–45)
HGB BLD-MCNC: 10 G/DL (ref 11.5–14.8)
HGB BLD-MCNC: 9.7 G/DL (ref 11.5–14.8)
LYMPHOCYTES NFR BLD AUTO: 1.7 K/UL (ref 0.8–4.8)
LYMPHOCYTES NFR BLD AUTO: 13.7 % (ref 20–44)
MCHC RBC AUTO-ENTMCNC: 32 G/DL (ref 31–36)
MCV RBC AUTO: 89 FL (ref 82–100)
MONOCYTES NFR BLD AUTO: 0.9 K/UL (ref 0.1–1.3)
MONOCYTES NFR BLD AUTO: 7.5 % (ref 2–12)
NEUTROPHILS # BLD AUTO: 9.5 K/UL (ref 1.8–8.9)
NEUTROPHILS NFR BLD AUTO: 76.2 % (ref 43–81)
PLATELET # BLD AUTO: 411 K/UL (ref 150–450)
POTASSIUM SERPL-SCNC: 3.8 MMOL/L (ref 3.5–5.1)
RBC # BLD AUTO: 3.56 MIL/UL (ref 4–5.2)
SODIUM SERPL-SCNC: 142 MMOL/L (ref 136–145)
WBC NRBC COR # BLD AUTO: 12.4 K/UL (ref 4.3–11)

## 2023-03-09 RX ADMIN — CHLORHEXIDINE GLUCONATE 0.12% ORAL RINSE SCH ML: 1.2 LIQUID ORAL at 16:10

## 2023-03-09 RX ADMIN — Medication SCH OZ: at 08:15

## 2023-03-09 RX ADMIN — PANTOPRAZOLE SODIUM SCH MG: 40 GRANULE, DELAYED RELEASE ORAL at 08:22

## 2023-03-09 RX ADMIN — LOSARTAN POTASSIUM SCH MG: 50 TABLET, FILM COATED ORAL at 16:11

## 2023-03-09 RX ADMIN — TRAMADOL HYDROCHLORIDE SCH MG: 50 TABLET, FILM COATED ORAL at 08:13

## 2023-03-09 RX ADMIN — Medication SCH EACH: at 18:07

## 2023-03-09 RX ADMIN — Medication SCH GM: at 08:23

## 2023-03-09 RX ADMIN — CHLORHEXIDINE GLUCONATE 0.12% ORAL RINSE SCH ML: 1.2 LIQUID ORAL at 08:13

## 2023-03-09 RX ADMIN — LOSARTAN POTASSIUM SCH MG: 50 TABLET, FILM COATED ORAL at 08:14

## 2023-03-09 RX ADMIN — Medication SCH EACH: at 11:15

## 2023-03-09 RX ADMIN — DOCUSATE SODIUM SCH MG: 50 LIQUID ORAL at 08:13

## 2023-03-09 RX ADMIN — QUETIAPINE SCH MG: 25 TABLET, FILM COATED ORAL at 21:20

## 2023-03-09 RX ADMIN — ACETAMINOPHEN PRN MG: 325 TABLET ORAL at 08:14

## 2023-03-09 RX ADMIN — TRAMADOL HYDROCHLORIDE SCH MG: 50 TABLET, FILM COATED ORAL at 16:11

## 2023-03-09 RX ADMIN — MUPIROCIN SCH APPLIC: 20 OINTMENT TOPICAL at 08:14

## 2023-03-09 RX ADMIN — MUPIROCIN SCH APPLIC: 20 OINTMENT TOPICAL at 21:21

## 2023-03-09 RX ADMIN — Medication SCH EACH: at 07:03

## 2023-03-09 RX ADMIN — Medication SCH OZ: at 08:23

## 2023-03-09 RX ADMIN — ACETAMINOPHEN SCH MG: 160 SOLUTION ORAL at 08:23

## 2023-03-09 RX ADMIN — Medication SCH EACH: at 00:13

## 2023-03-09 RX ADMIN — ATORVASTATIN CALCIUM SCH MG: 40 TABLET, FILM COATED ORAL at 21:20

## 2023-03-09 RX ADMIN — AMLODIPINE BESYLATE SCH MG: 5 TABLET ORAL at 08:13

## 2023-03-10 VITALS — DIASTOLIC BLOOD PRESSURE: 75 MMHG | SYSTOLIC BLOOD PRESSURE: 138 MMHG

## 2023-03-10 VITALS — SYSTOLIC BLOOD PRESSURE: 118 MMHG | DIASTOLIC BLOOD PRESSURE: 66 MMHG

## 2023-03-10 LAB
BUN SERPL-MCNC: 30 MG/DL (ref 7–18)
CALCIUM SERPL-MCNC: 9.6 MG/DL (ref 8.5–10.1)
CHLORIDE SERPL-SCNC: 110 MMOL/L (ref 98–107)
CO2 SERPL-SCNC: 27 MMOL/L (ref 21–32)
CREAT SERPL-MCNC: 0.6 MG/DL (ref 0.6–1.3)
GLUCOSE SERPL-MCNC: 149 MG/DL (ref 74–106)
HCT VFR BLD AUTO: 32 % (ref 33–45)
HCT VFR BLD AUTO: 32 % (ref 33–45)
HGB BLD-MCNC: 10 G/DL (ref 11.5–14.8)
HGB BLD-MCNC: 10.4 G/DL (ref 11.5–14.8)
POTASSIUM SERPL-SCNC: 4.2 MMOL/L (ref 3.5–5.1)
SODIUM SERPL-SCNC: 143 MMOL/L (ref 136–145)

## 2023-03-10 RX ADMIN — Medication SCH ML: at 11:48

## 2023-03-10 RX ADMIN — Medication SCH OZ: at 09:26

## 2023-03-10 RX ADMIN — Medication SCH EACH: at 11:36

## 2023-03-10 RX ADMIN — PANTOPRAZOLE SODIUM SCH MG: 40 GRANULE, DELAYED RELEASE ORAL at 09:24

## 2023-03-10 RX ADMIN — CHLORHEXIDINE GLUCONATE 0.12% ORAL RINSE SCH ML: 1.2 LIQUID ORAL at 09:25

## 2023-03-10 RX ADMIN — INSULIN HUMAN PRN UNITS: 100 INJECTION, SOLUTION PARENTERAL at 11:39

## 2023-03-10 RX ADMIN — TRAMADOL HYDROCHLORIDE SCH MG: 50 TABLET, FILM COATED ORAL at 09:27

## 2023-03-10 RX ADMIN — Medication SCH EACH: at 05:21

## 2023-03-10 RX ADMIN — DOCUSATE SODIUM SCH MG: 50 LIQUID ORAL at 09:24

## 2023-03-10 RX ADMIN — INSULIN HUMAN PRN UNITS: 100 INJECTION, SOLUTION PARENTERAL at 05:58

## 2023-03-10 RX ADMIN — AMLODIPINE BESYLATE SCH MG: 5 TABLET ORAL at 09:25

## 2023-03-10 RX ADMIN — LOSARTAN POTASSIUM SCH MG: 50 TABLET, FILM COATED ORAL at 09:24

## 2023-03-10 RX ADMIN — Medication SCH OZ: at 09:25

## 2023-03-10 RX ADMIN — MUPIROCIN SCH APPLIC: 20 OINTMENT TOPICAL at 09:28

## 2023-03-10 RX ADMIN — Medication SCH OZ: at 09:37

## 2023-03-10 RX ADMIN — ACETAMINOPHEN SCH MG: 160 SOLUTION ORAL at 09:24

## 2023-03-10 RX ADMIN — Medication SCH EACH: at 00:27

## 2023-03-10 RX ADMIN — Medication SCH GM: at 09:25

## 2023-04-18 ENCOUNTER — HOSPITAL ENCOUNTER (INPATIENT)
Dept: HOSPITAL 54 - ER | Age: 78
LOS: 23 days | Discharge: NURSING, REHAB OR LONG-TERM CARE | DRG: 853 | End: 2023-05-11
Attending: LEGAL MEDICINE | Admitting: NURSE PRACTITIONER
Payer: MEDICARE

## 2023-04-18 VITALS — WEIGHT: 86 LBS | HEIGHT: 60 IN | BODY MASS INDEX: 16.88 KG/M2

## 2023-04-18 VITALS — SYSTOLIC BLOOD PRESSURE: 141 MMHG | DIASTOLIC BLOOD PRESSURE: 78 MMHG

## 2023-04-18 DIAGNOSIS — J69.0: ICD-10-CM

## 2023-04-18 DIAGNOSIS — M24.562: ICD-10-CM

## 2023-04-18 DIAGNOSIS — E11.52: ICD-10-CM

## 2023-04-18 DIAGNOSIS — Z20.822: ICD-10-CM

## 2023-04-18 DIAGNOSIS — G89.29: ICD-10-CM

## 2023-04-18 DIAGNOSIS — L97.518: ICD-10-CM

## 2023-04-18 DIAGNOSIS — L89.621: ICD-10-CM

## 2023-04-18 DIAGNOSIS — R13.10: ICD-10-CM

## 2023-04-18 DIAGNOSIS — N39.0: ICD-10-CM

## 2023-04-18 DIAGNOSIS — A41.9: Primary | ICD-10-CM

## 2023-04-18 DIAGNOSIS — I11.9: ICD-10-CM

## 2023-04-18 DIAGNOSIS — M54.50: ICD-10-CM

## 2023-04-18 DIAGNOSIS — M24.561: ICD-10-CM

## 2023-04-18 DIAGNOSIS — I69.391: ICD-10-CM

## 2023-04-18 DIAGNOSIS — F03.94: ICD-10-CM

## 2023-04-18 DIAGNOSIS — I70.0: ICD-10-CM

## 2023-04-18 DIAGNOSIS — R53.2: ICD-10-CM

## 2023-04-18 DIAGNOSIS — E87.6: ICD-10-CM

## 2023-04-18 DIAGNOSIS — J44.9: ICD-10-CM

## 2023-04-18 DIAGNOSIS — E44.0: ICD-10-CM

## 2023-04-18 DIAGNOSIS — E11.621: ICD-10-CM

## 2023-04-18 DIAGNOSIS — A04.72: ICD-10-CM

## 2023-04-18 DIAGNOSIS — M24.552: ICD-10-CM

## 2023-04-18 DIAGNOSIS — L03.115: ICD-10-CM

## 2023-04-18 DIAGNOSIS — B86: ICD-10-CM

## 2023-04-18 DIAGNOSIS — D68.59: ICD-10-CM

## 2023-04-18 DIAGNOSIS — E87.0: ICD-10-CM

## 2023-04-18 DIAGNOSIS — L89.123: ICD-10-CM

## 2023-04-18 DIAGNOSIS — L89.613: ICD-10-CM

## 2023-04-18 DIAGNOSIS — G92.8: ICD-10-CM

## 2023-04-18 DIAGNOSIS — E86.0: ICD-10-CM
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
## 2023-04-18 DIAGNOSIS — Z78.1: ICD-10-CM

## 2023-04-18 DIAGNOSIS — M86.8X7: ICD-10-CM

## 2023-04-18 DIAGNOSIS — L30.9: ICD-10-CM

## 2023-04-18 DIAGNOSIS — E88.09: ICD-10-CM

## 2023-04-18 DIAGNOSIS — J96.10: ICD-10-CM
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
## 2023-04-18 DIAGNOSIS — F17.210: ICD-10-CM

## 2023-04-18 DIAGNOSIS — Z86.59: ICD-10-CM

## 2023-04-18 DIAGNOSIS — S60.821A: ICD-10-CM

## 2023-04-18 DIAGNOSIS — E78.5: ICD-10-CM

## 2023-04-18 DIAGNOSIS — E11.69: ICD-10-CM

## 2023-04-18 DIAGNOSIS — N28.9: ICD-10-CM

## 2023-04-18 DIAGNOSIS — M24.551: ICD-10-CM

## 2023-04-18 DIAGNOSIS — Z79.4: ICD-10-CM

## 2023-04-18 DIAGNOSIS — R64: ICD-10-CM

## 2023-04-18 DIAGNOSIS — I69.351: ICD-10-CM

## 2023-04-18 DIAGNOSIS — Z93.1: ICD-10-CM

## 2023-04-18 LAB
ALBUMIN SERPL BCP-MCNC: 2.1 G/DL (ref 3.4–5)
ALP SERPL-CCNC: 125 U/L (ref 46–116)
ALT SERPL W P-5'-P-CCNC: 69 U/L (ref 12–78)
AST SERPL W P-5'-P-CCNC: 53 U/L (ref 15–37)
BASOPHILS # BLD AUTO: 0 K/UL (ref 0–0.2)
BASOPHILS NFR BLD AUTO: 0.2 % (ref 0–2)
BILIRUB DIRECT SERPL-MCNC: 0.1 MG/DL (ref 0–0.2)
BILIRUB SERPL-MCNC: 0.4 MG/DL (ref 0.2–1)
BILIRUB UR QL STRIP: NEGATIVE
BUN SERPL-MCNC: 34 MG/DL (ref 7–18)
CALCIUM SERPL-MCNC: 10.2 MG/DL (ref 8.5–10.1)
CHLORIDE SERPL-SCNC: 114 MMOL/L (ref 98–107)
CO2 SERPL-SCNC: 23 MMOL/L (ref 21–32)
COLOR UR: YELLOW
CREAT SERPL-MCNC: 0.8 MG/DL (ref 0.6–1.3)
EOSINOPHIL NFR BLD AUTO: 0.1 % (ref 0–6)
GLUCOSE SERPL-MCNC: 153 MG/DL (ref 74–106)
GLUCOSE UR STRIP-MCNC: NEGATIVE MG/DL
HCT VFR BLD AUTO: 35 % (ref 33–45)
HGB BLD-MCNC: 10.6 G/DL (ref 11.5–14.8)
LEUKOCYTE ESTERASE UR QL STRIP: (no result)
LYMPHOCYTES NFR BLD AUTO: 1.2 K/UL (ref 0.8–4.8)
LYMPHOCYTES NFR BLD AUTO: 5.6 % (ref 20–44)
MCHC RBC AUTO-ENTMCNC: 30 G/DL (ref 31–36)
MCV RBC AUTO: 81 FL (ref 82–100)
MONOCYTES NFR BLD AUTO: 1 K/UL (ref 0.1–1.3)
MONOCYTES NFR BLD AUTO: 4.5 % (ref 2–12)
NEUTROPHILS # BLD AUTO: 20.1 K/UL (ref 1.8–8.9)
NEUTROPHILS NFR BLD AUTO: 89.6 % (ref 43–81)
NITRITE UR QL STRIP: NEGATIVE
PH UR STRIP: 7 [PH] (ref 5–8)
PLATELET # BLD AUTO: 584 K/UL (ref 150–450)
POTASSIUM SERPL-SCNC: 3.9 MMOL/L (ref 3.5–5.1)
PROT SERPL-MCNC: 8 G/DL (ref 6.4–8.2)
PROT UR QL STRIP: (no result) MG/DL
RBC # BLD AUTO: 4.34 MIL/UL (ref 4–5.2)
RBC #/AREA URNS HPF: (no result) /HPF (ref 0–2)
SODIUM SERPL-SCNC: 148 MMOL/L (ref 136–145)
UROBILINOGEN UR STRIP-MCNC: 0.2 EU/DL
WBC #/AREA URNS HPF: (no result) /HPF (ref 0–3)
WBC NRBC COR # BLD AUTO: 22.4 K/UL (ref 4.3–11)

## 2023-04-18 PROCEDURE — A4223 INFUSION SUPPLIES W/O PUMP: HCPCS

## 2023-04-18 PROCEDURE — C9803 HOPD COVID-19 SPEC COLLECT: HCPCS

## 2023-04-18 PROCEDURE — G0378 HOSPITAL OBSERVATION PER HR: HCPCS

## 2023-04-18 PROCEDURE — A6403 STERILE GAUZE>16 <= 48 SQ IN: HCPCS

## 2023-04-18 PROCEDURE — A6253 ABSORPT DRG > 48 SQ IN W/O B: HCPCS

## 2023-04-18 RX ADMIN — QUETIAPINE SCH MG: 25 TABLET, FILM COATED ORAL at 22:53

## 2023-04-18 RX ADMIN — ATORVASTATIN CALCIUM SCH MG: 40 TABLET, FILM COATED ORAL at 22:53

## 2023-04-19 VITALS — SYSTOLIC BLOOD PRESSURE: 134 MMHG | DIASTOLIC BLOOD PRESSURE: 71 MMHG

## 2023-04-19 VITALS — DIASTOLIC BLOOD PRESSURE: 65 MMHG | SYSTOLIC BLOOD PRESSURE: 123 MMHG

## 2023-04-19 VITALS — SYSTOLIC BLOOD PRESSURE: 123 MMHG | DIASTOLIC BLOOD PRESSURE: 62 MMHG

## 2023-04-19 VITALS — SYSTOLIC BLOOD PRESSURE: 144 MMHG | DIASTOLIC BLOOD PRESSURE: 58 MMHG

## 2023-04-19 VITALS — SYSTOLIC BLOOD PRESSURE: 126 MMHG | DIASTOLIC BLOOD PRESSURE: 81 MMHG

## 2023-04-19 VITALS — SYSTOLIC BLOOD PRESSURE: 133 MMHG | DIASTOLIC BLOOD PRESSURE: 68 MMHG

## 2023-04-19 LAB
BUN SERPL-MCNC: 22 MG/DL (ref 7–18)
CALCIUM SERPL-MCNC: 9.5 MG/DL (ref 8.5–10.1)
CHLORIDE SERPL-SCNC: 117 MMOL/L (ref 98–107)
CO2 SERPL-SCNC: 23 MMOL/L (ref 21–32)
CREAT SERPL-MCNC: 0.6 MG/DL (ref 0.6–1.3)
GLUCOSE SERPL-MCNC: 113 MG/DL (ref 74–106)
POTASSIUM SERPL-SCNC: 3.2 MMOL/L (ref 3.5–5.1)
SODIUM SERPL-SCNC: 151 MMOL/L (ref 136–145)

## 2023-04-19 RX ADMIN — INSULIN HUMAN PRN UNITS: 100 INJECTION, SOLUTION PARENTERAL at 06:45

## 2023-04-19 RX ADMIN — QUETIAPINE SCH MG: 25 TABLET, FILM COATED ORAL at 21:40

## 2023-04-19 RX ADMIN — Medication SCH EACH: at 06:45

## 2023-04-19 RX ADMIN — ENOXAPARIN SODIUM SCH MG: 40 INJECTION SUBCUTANEOUS at 10:43

## 2023-04-19 RX ADMIN — ACETAMINOPHEN SCH MG: 160 SOLUTION ORAL at 09:34

## 2023-04-19 RX ADMIN — SODIUM CHLORIDE SCH MLS/HR: 4.5 INJECTION, SOLUTION INTRAVENOUS at 12:25

## 2023-04-19 RX ADMIN — Medication SCH EACH: at 00:09

## 2023-04-19 RX ADMIN — CEFEPIME HYDROCHLORIDE SCH MLS/HR: 1 INJECTION, POWDER, FOR SOLUTION INTRAMUSCULAR; INTRAVENOUS at 16:07

## 2023-04-19 RX ADMIN — POTASSIUM CHLORIDE SCH MEQ: 1.5 POWDER, FOR SOLUTION ORAL at 10:41

## 2023-04-19 RX ADMIN — AMLODIPINE BESYLATE SCH MG: 5 TABLET ORAL at 09:36

## 2023-04-19 RX ADMIN — TRAMADOL HYDROCHLORIDE SCH MG: 50 TABLET, FILM COATED ORAL at 17:46

## 2023-04-19 RX ADMIN — DEXTROSE MONOHYDRATE SCH MLS/HR: 50 INJECTION, SOLUTION INTRAVENOUS at 16:41

## 2023-04-19 RX ADMIN — POTASSIUM CHLORIDE SCH MEQ: 1.5 POWDER, FOR SOLUTION ORAL at 11:43

## 2023-04-19 RX ADMIN — LOSARTAN POTASSIUM SCH MG: 50 TABLET, FILM COATED ORAL at 17:47

## 2023-04-19 RX ADMIN — Medication PRN ML: at 12:28

## 2023-04-19 RX ADMIN — Medication SCH EACH: at 23:49

## 2023-04-19 RX ADMIN — INSULIN HUMAN PRN UNITS: 100 INJECTION, SOLUTION PARENTERAL at 12:16

## 2023-04-19 RX ADMIN — DOCUSATE SODIUM SCH MG: 50 LIQUID ORAL at 09:34

## 2023-04-19 RX ADMIN — Medication SCH EACH: at 17:23

## 2023-04-19 RX ADMIN — ATORVASTATIN CALCIUM SCH MG: 40 TABLET, FILM COATED ORAL at 21:39

## 2023-04-19 RX ADMIN — PANTOPRAZOLE SODIUM SCH MG: 40 GRANULE, DELAYED RELEASE ORAL at 11:43

## 2023-04-19 RX ADMIN — Medication SCH EACH: at 12:15

## 2023-04-19 RX ADMIN — DEXTROSE MONOHYDRATE SCH MLS/HR: 50 INJECTION, SOLUTION INTRAVENOUS at 04:53

## 2023-04-19 RX ADMIN — INSULIN HUMAN PRN UNITS: 100 INJECTION, SOLUTION PARENTERAL at 00:10

## 2023-04-19 RX ADMIN — ASPIRIN 81 MG SCH MG: 81 TABLET ORAL at 18:11

## 2023-04-19 RX ADMIN — TRAMADOL HYDROCHLORIDE SCH MG: 50 TABLET, FILM COATED ORAL at 09:35

## 2023-04-19 RX ADMIN — LOSARTAN POTASSIUM SCH MG: 50 TABLET, FILM COATED ORAL at 09:36

## 2023-04-19 RX ADMIN — INSULIN HUMAN PRN UNITS: 100 INJECTION, SOLUTION PARENTERAL at 17:24

## 2023-04-20 VITALS — DIASTOLIC BLOOD PRESSURE: 68 MMHG | SYSTOLIC BLOOD PRESSURE: 136 MMHG

## 2023-04-20 VITALS — SYSTOLIC BLOOD PRESSURE: 90 MMHG | DIASTOLIC BLOOD PRESSURE: 63 MMHG

## 2023-04-20 VITALS — DIASTOLIC BLOOD PRESSURE: 50 MMHG | SYSTOLIC BLOOD PRESSURE: 92 MMHG

## 2023-04-20 VITALS — SYSTOLIC BLOOD PRESSURE: 114 MMHG | DIASTOLIC BLOOD PRESSURE: 64 MMHG

## 2023-04-20 VITALS — SYSTOLIC BLOOD PRESSURE: 123 MMHG | DIASTOLIC BLOOD PRESSURE: 63 MMHG

## 2023-04-20 LAB
BUN SERPL-MCNC: 25 MG/DL (ref 7–18)
CALCIUM SERPL-MCNC: 9 MG/DL (ref 8.5–10.1)
CHLORIDE SERPL-SCNC: 117 MMOL/L (ref 98–107)
CO2 SERPL-SCNC: 16 MMOL/L (ref 21–32)
CREAT SERPL-MCNC: 0.5 MG/DL (ref 0.6–1.3)
GLUCOSE SERPL-MCNC: 129 MG/DL (ref 74–106)
POTASSIUM SERPL-SCNC: 3.8 MMOL/L (ref 3.5–5.1)
SODIUM SERPL-SCNC: 145 MMOL/L (ref 136–145)

## 2023-04-20 RX ADMIN — DOCUSATE SODIUM SCH MG: 50 LIQUID ORAL at 08:49

## 2023-04-20 RX ADMIN — ENOXAPARIN SODIUM SCH MG: 40 INJECTION SUBCUTANEOUS at 08:53

## 2023-04-20 RX ADMIN — SODIUM CHLORIDE SCH MLS/HR: 4.5 INJECTION, SOLUTION INTRAVENOUS at 12:17

## 2023-04-20 RX ADMIN — Medication SCH APPLIC: at 08:54

## 2023-04-20 RX ADMIN — INSULIN HUMAN PRN UNITS: 100 INJECTION, SOLUTION PARENTERAL at 23:14

## 2023-04-20 RX ADMIN — SODIUM CHLORIDE SCH MLS/HR: 4.5 INJECTION, SOLUTION INTRAVENOUS at 00:39

## 2023-04-20 RX ADMIN — Medication SCH EACH: at 06:14

## 2023-04-20 RX ADMIN — QUETIAPINE SCH MG: 25 TABLET, FILM COATED ORAL at 22:07

## 2023-04-20 RX ADMIN — CEFEPIME HYDROCHLORIDE SCH MLS/HR: 1 INJECTION, POWDER, FOR SOLUTION INTRAMUSCULAR; INTRAVENOUS at 15:55

## 2023-04-20 RX ADMIN — PANTOPRAZOLE SODIUM SCH MG: 40 GRANULE, DELAYED RELEASE ORAL at 11:27

## 2023-04-20 RX ADMIN — ASPIRIN 81 MG SCH MG: 81 TABLET ORAL at 17:32

## 2023-04-20 RX ADMIN — DEXTROSE MONOHYDRATE SCH MLS/HR: 50 INJECTION, SOLUTION INTRAVENOUS at 18:29

## 2023-04-20 RX ADMIN — AMLODIPINE BESYLATE SCH MG: 5 TABLET ORAL at 08:50

## 2023-04-20 RX ADMIN — Medication SCH EACH: at 23:14

## 2023-04-20 RX ADMIN — DEXTROSE MONOHYDRATE SCH MLS/HR: 50 INJECTION, SOLUTION INTRAVENOUS at 04:04

## 2023-04-20 RX ADMIN — LOSARTAN POTASSIUM SCH MG: 50 TABLET, FILM COATED ORAL at 17:36

## 2023-04-20 RX ADMIN — INSULIN HUMAN PRN UNITS: 100 INJECTION, SOLUTION PARENTERAL at 11:21

## 2023-04-20 RX ADMIN — Medication PRN ML: at 16:52

## 2023-04-20 RX ADMIN — Medication SCH EACH: at 17:32

## 2023-04-20 RX ADMIN — Medication SCH EACH: at 11:20

## 2023-04-20 RX ADMIN — ACETAMINOPHEN SCH MG: 160 SOLUTION ORAL at 08:49

## 2023-04-20 RX ADMIN — ATORVASTATIN CALCIUM SCH MG: 40 TABLET, FILM COATED ORAL at 22:07

## 2023-04-20 RX ADMIN — TRAMADOL HYDROCHLORIDE SCH MG: 50 TABLET, FILM COATED ORAL at 17:32

## 2023-04-20 RX ADMIN — TRAMADOL HYDROCHLORIDE SCH MG: 50 TABLET, FILM COATED ORAL at 08:49

## 2023-04-20 RX ADMIN — LOSARTAN POTASSIUM SCH MG: 50 TABLET, FILM COATED ORAL at 08:50

## 2023-04-21 VITALS — SYSTOLIC BLOOD PRESSURE: 138 MMHG | DIASTOLIC BLOOD PRESSURE: 68 MMHG

## 2023-04-21 VITALS — DIASTOLIC BLOOD PRESSURE: 62 MMHG | SYSTOLIC BLOOD PRESSURE: 125 MMHG

## 2023-04-21 VITALS — DIASTOLIC BLOOD PRESSURE: 68 MMHG | SYSTOLIC BLOOD PRESSURE: 92 MMHG

## 2023-04-21 VITALS — SYSTOLIC BLOOD PRESSURE: 120 MMHG | DIASTOLIC BLOOD PRESSURE: 63 MMHG

## 2023-04-21 VITALS — DIASTOLIC BLOOD PRESSURE: 72 MMHG | SYSTOLIC BLOOD PRESSURE: 122 MMHG

## 2023-04-21 VITALS — DIASTOLIC BLOOD PRESSURE: 55 MMHG | SYSTOLIC BLOOD PRESSURE: 104 MMHG

## 2023-04-21 LAB
BASOPHILS # BLD AUTO: 0.1 K/UL (ref 0–0.2)
BASOPHILS NFR BLD AUTO: 0.8 % (ref 0–2)
BUN SERPL-MCNC: 22 MG/DL (ref 7–18)
CALCIUM SERPL-MCNC: 8.9 MG/DL (ref 8.5–10.1)
CHLORIDE SERPL-SCNC: 114 MMOL/L (ref 98–107)
CO2 SERPL-SCNC: 25 MMOL/L (ref 21–32)
CREAT SERPL-MCNC: 0.7 MG/DL (ref 0.6–1.3)
EOSINOPHIL NFR BLD AUTO: 5.2 % (ref 0–6)
GLUCOSE SERPL-MCNC: 116 MG/DL (ref 74–106)
HCT VFR BLD AUTO: 30 % (ref 33–45)
HGB BLD-MCNC: 9.3 G/DL (ref 11.5–14.8)
LYMPHOCYTES NFR BLD AUTO: 1.1 K/UL (ref 0.8–4.8)
LYMPHOCYTES NFR BLD AUTO: 9.9 % (ref 20–44)
MAGNESIUM SERPL-MCNC: 1.9 MG/DL (ref 1.8–2.4)
MCHC RBC AUTO-ENTMCNC: 31 G/DL (ref 31–36)
MCV RBC AUTO: 81 FL (ref 82–100)
MONOCYTES NFR BLD AUTO: 0.6 K/UL (ref 0.1–1.3)
MONOCYTES NFR BLD AUTO: 5 % (ref 2–12)
NEUTROPHILS # BLD AUTO: 8.7 K/UL (ref 1.8–8.9)
NEUTROPHILS NFR BLD AUTO: 79.1 % (ref 43–81)
PHOSPHATE SERPL-MCNC: 1.9 MG/DL (ref 2.5–4.9)
PLATELET # BLD AUTO: 575 K/UL (ref 150–450)
POTASSIUM SERPL-SCNC: 3.1 MMOL/L (ref 3.5–5.1)
RBC # BLD AUTO: 3.76 MIL/UL (ref 4–5.2)
SODIUM SERPL-SCNC: 147 MMOL/L (ref 136–145)
WBC NRBC COR # BLD AUTO: 11 K/UL (ref 4.3–11)

## 2023-04-21 RX ADMIN — Medication PRN ML: at 13:58

## 2023-04-21 RX ADMIN — Medication SCH EACH: at 05:09

## 2023-04-21 RX ADMIN — CEFEPIME HYDROCHLORIDE SCH MLS/HR: 1 INJECTION, POWDER, FOR SOLUTION INTRAMUSCULAR; INTRAVENOUS at 16:22

## 2023-04-21 RX ADMIN — Medication SCH EACH: at 13:06

## 2023-04-21 RX ADMIN — POTASSIUM CHLORIDE SCH MEQ: 1.5 POWDER, FOR SOLUTION ORAL at 16:22

## 2023-04-21 RX ADMIN — SODIUM CHLORIDE SCH MLS/HR: 4.5 INJECTION, SOLUTION INTRAVENOUS at 10:58

## 2023-04-21 RX ADMIN — ASPIRIN 81 MG SCH MG: 81 TABLET ORAL at 17:47

## 2023-04-21 RX ADMIN — POTASSIUM CHLORIDE SCH MEQ: 1.5 POWDER, FOR SOLUTION ORAL at 13:58

## 2023-04-21 RX ADMIN — LOSARTAN POTASSIUM SCH MG: 50 TABLET, FILM COATED ORAL at 08:47

## 2023-04-21 RX ADMIN — DOCUSATE SODIUM SCH MG: 50 LIQUID ORAL at 08:45

## 2023-04-21 RX ADMIN — AMLODIPINE BESYLATE SCH MG: 5 TABLET ORAL at 08:48

## 2023-04-21 RX ADMIN — TRAMADOL HYDROCHLORIDE SCH MG: 50 TABLET, FILM COATED ORAL at 16:22

## 2023-04-21 RX ADMIN — SODIUM CHLORIDE SCH MLS/HR: 4.5 INJECTION, SOLUTION INTRAVENOUS at 01:55

## 2023-04-21 RX ADMIN — ENOXAPARIN SODIUM SCH MG: 40 INJECTION SUBCUTANEOUS at 08:45

## 2023-04-21 RX ADMIN — DEXTROSE MONOHYDRATE SCH MLS/HR: 50 INJECTION, SOLUTION INTRAVENOUS at 03:11

## 2023-04-21 RX ADMIN — QUETIAPINE SCH MG: 25 TABLET, FILM COATED ORAL at 21:27

## 2023-04-21 RX ADMIN — Medication SCH APPLIC: at 08:46

## 2023-04-21 RX ADMIN — TRAMADOL HYDROCHLORIDE SCH MG: 50 TABLET, FILM COATED ORAL at 08:45

## 2023-04-21 RX ADMIN — DEXTROSE MONOHYDRATE SCH MLS/HR: 50 INJECTION, SOLUTION INTRAVENOUS at 17:37

## 2023-04-21 RX ADMIN — INSULIN HUMAN PRN UNITS: 100 INJECTION, SOLUTION PARENTERAL at 05:09

## 2023-04-21 RX ADMIN — ATORVASTATIN CALCIUM SCH MG: 40 TABLET, FILM COATED ORAL at 21:27

## 2023-04-21 RX ADMIN — Medication SCH EACH: at 17:35

## 2023-04-21 RX ADMIN — INSULIN HUMAN PRN UNITS: 100 INJECTION, SOLUTION PARENTERAL at 17:36

## 2023-04-21 RX ADMIN — INSULIN HUMAN PRN UNITS: 100 INJECTION, SOLUTION PARENTERAL at 13:06

## 2023-04-21 RX ADMIN — LOSARTAN POTASSIUM SCH MG: 50 TABLET, FILM COATED ORAL at 16:22

## 2023-04-21 RX ADMIN — ACETAMINOPHEN SCH MG: 160 SOLUTION ORAL at 08:49

## 2023-04-21 RX ADMIN — PANTOPRAZOLE SODIUM SCH MG: 40 GRANULE, DELAYED RELEASE ORAL at 11:46

## 2023-04-22 VITALS — DIASTOLIC BLOOD PRESSURE: 60 MMHG | SYSTOLIC BLOOD PRESSURE: 115 MMHG

## 2023-04-22 VITALS — DIASTOLIC BLOOD PRESSURE: 58 MMHG | SYSTOLIC BLOOD PRESSURE: 106 MMHG

## 2023-04-22 VITALS — DIASTOLIC BLOOD PRESSURE: 63 MMHG | SYSTOLIC BLOOD PRESSURE: 114 MMHG

## 2023-04-22 VITALS — SYSTOLIC BLOOD PRESSURE: 153 MMHG | DIASTOLIC BLOOD PRESSURE: 54 MMHG

## 2023-04-22 VITALS — DIASTOLIC BLOOD PRESSURE: 55 MMHG | SYSTOLIC BLOOD PRESSURE: 120 MMHG

## 2023-04-22 VITALS — SYSTOLIC BLOOD PRESSURE: 115 MMHG | DIASTOLIC BLOOD PRESSURE: 56 MMHG

## 2023-04-22 LAB
BUN SERPL-MCNC: 21 MG/DL (ref 7–18)
CALCIUM SERPL-MCNC: 8.8 MG/DL (ref 8.5–10.1)
CHLORIDE SERPL-SCNC: 111 MMOL/L (ref 98–107)
CO2 SERPL-SCNC: 23 MMOL/L (ref 21–32)
CREAT SERPL-MCNC: 0.5 MG/DL (ref 0.6–1.3)
GLUCOSE SERPL-MCNC: 123 MG/DL (ref 74–106)
PHOSPHATE SERPL-MCNC: 2.7 MG/DL (ref 2.5–4.9)
POTASSIUM SERPL-SCNC: 4.1 MMOL/L (ref 3.5–5.1)
SODIUM SERPL-SCNC: 143 MMOL/L (ref 136–145)

## 2023-04-22 RX ADMIN — ATORVASTATIN CALCIUM SCH MG: 40 TABLET, FILM COATED ORAL at 21:15

## 2023-04-22 RX ADMIN — QUETIAPINE SCH MG: 25 TABLET, FILM COATED ORAL at 21:17

## 2023-04-22 RX ADMIN — PANTOPRAZOLE SODIUM SCH MG: 40 GRANULE, DELAYED RELEASE ORAL at 12:06

## 2023-04-22 RX ADMIN — Medication SCH EACH: at 00:09

## 2023-04-22 RX ADMIN — AMLODIPINE BESYLATE SCH MG: 5 TABLET ORAL at 08:57

## 2023-04-22 RX ADMIN — TRAMADOL HYDROCHLORIDE SCH MG: 50 TABLET, FILM COATED ORAL at 16:42

## 2023-04-22 RX ADMIN — LOSARTAN POTASSIUM SCH MG: 50 TABLET, FILM COATED ORAL at 16:42

## 2023-04-22 RX ADMIN — SODIUM CHLORIDE SCH MLS/HR: 4.5 INJECTION, SOLUTION INTRAVENOUS at 15:27

## 2023-04-22 RX ADMIN — Medication PRN ML: at 12:18

## 2023-04-22 RX ADMIN — INSULIN HUMAN PRN UNITS: 100 INJECTION, SOLUTION PARENTERAL at 00:09

## 2023-04-22 RX ADMIN — TRAMADOL HYDROCHLORIDE SCH MG: 50 TABLET, FILM COATED ORAL at 08:57

## 2023-04-22 RX ADMIN — CLOTRIMAZOLE SCH APPLIC: 1 CREAM TOPICAL at 16:42

## 2023-04-22 RX ADMIN — DEXTROSE MONOHYDRATE SCH MLS/HR: 50 INJECTION, SOLUTION INTRAVENOUS at 03:12

## 2023-04-22 RX ADMIN — ENOXAPARIN SODIUM SCH MG: 40 INJECTION SUBCUTANEOUS at 08:59

## 2023-04-22 RX ADMIN — INSULIN HUMAN PRN UNITS: 100 INJECTION, SOLUTION PARENTERAL at 06:25

## 2023-04-22 RX ADMIN — Medication SCH EACH: at 06:25

## 2023-04-22 RX ADMIN — CEFEPIME HYDROCHLORIDE SCH MLS/HR: 1 INJECTION, POWDER, FOR SOLUTION INTRAMUSCULAR; INTRAVENOUS at 15:28

## 2023-04-22 RX ADMIN — Medication SCH EACH: at 17:06

## 2023-04-22 RX ADMIN — Medication SCH APPLIC: at 09:57

## 2023-04-22 RX ADMIN — Medication SCH EACH: at 12:06

## 2023-04-22 RX ADMIN — ASPIRIN 81 MG SCH MG: 81 TABLET ORAL at 17:06

## 2023-04-22 RX ADMIN — ACETAMINOPHEN SCH MG: 160 SOLUTION ORAL at 08:58

## 2023-04-22 RX ADMIN — DOCUSATE SODIUM SCH MG: 50 LIQUID ORAL at 08:57

## 2023-04-22 RX ADMIN — DEXTROSE MONOHYDRATE SCH MLS/HR: 50 INJECTION, SOLUTION INTRAVENOUS at 16:17

## 2023-04-22 RX ADMIN — SODIUM CHLORIDE SCH MLS/HR: 4.5 INJECTION, SOLUTION INTRAVENOUS at 01:40

## 2023-04-22 RX ADMIN — LOSARTAN POTASSIUM SCH MG: 50 TABLET, FILM COATED ORAL at 08:57

## 2023-04-23 VITALS — DIASTOLIC BLOOD PRESSURE: 55 MMHG | SYSTOLIC BLOOD PRESSURE: 107 MMHG

## 2023-04-23 VITALS — SYSTOLIC BLOOD PRESSURE: 125 MMHG | DIASTOLIC BLOOD PRESSURE: 58 MMHG

## 2023-04-23 VITALS — DIASTOLIC BLOOD PRESSURE: 62 MMHG | SYSTOLIC BLOOD PRESSURE: 116 MMHG

## 2023-04-23 VITALS — SYSTOLIC BLOOD PRESSURE: 123 MMHG | DIASTOLIC BLOOD PRESSURE: 61 MMHG

## 2023-04-23 VITALS — DIASTOLIC BLOOD PRESSURE: 65 MMHG | SYSTOLIC BLOOD PRESSURE: 114 MMHG

## 2023-04-23 VITALS — DIASTOLIC BLOOD PRESSURE: 62 MMHG | SYSTOLIC BLOOD PRESSURE: 125 MMHG

## 2023-04-23 LAB
BASOPHILS # BLD AUTO: 0.1 K/UL (ref 0–0.2)
BASOPHILS NFR BLD AUTO: 0.4 % (ref 0–2)
BUN SERPL-MCNC: 20 MG/DL (ref 7–18)
CALCIUM SERPL-MCNC: 8.9 MG/DL (ref 8.5–10.1)
CHLORIDE SERPL-SCNC: 110 MMOL/L (ref 98–107)
CO2 SERPL-SCNC: 23 MMOL/L (ref 21–32)
CREAT SERPL-MCNC: 0.7 MG/DL (ref 0.6–1.3)
EOSINOPHIL NFR BLD AUTO: 6.3 % (ref 0–6)
EOSINOPHIL NFR BLD MANUAL: 4 % (ref 0–4)
GLUCOSE SERPL-MCNC: 110 MG/DL (ref 74–106)
HCT VFR BLD AUTO: 34 % (ref 33–45)
HGB BLD-MCNC: 10.3 G/DL (ref 11.5–14.8)
LYMPHOCYTES NFR BLD AUTO: 1.5 K/UL (ref 0.8–4.8)
LYMPHOCYTES NFR BLD AUTO: 10.2 % (ref 20–44)
LYMPHOCYTES NFR BLD MANUAL: 8 % (ref 16–48)
MCHC RBC AUTO-ENTMCNC: 30 G/DL (ref 31–36)
MCV RBC AUTO: 81 FL (ref 82–100)
MONOCYTES NFR BLD AUTO: 0.7 K/UL (ref 0.1–1.3)
MONOCYTES NFR BLD AUTO: 4.5 % (ref 2–12)
MONOCYTES NFR BLD MANUAL: 1 % (ref 0–11)
NEUTROPHILS # BLD AUTO: 11.6 K/UL (ref 1.8–8.9)
NEUTROPHILS NFR BLD AUTO: 78.6 % (ref 43–81)
NEUTS BAND NFR BLD MANUAL: 2 % (ref 0–5)
NEUTS SEG NFR BLD MANUAL: 85 % (ref 42–76)
PLATELET # BLD AUTO: 559 K/UL (ref 150–450)
POTASSIUM SERPL-SCNC: 3.8 MMOL/L (ref 3.5–5.1)
RBC # BLD AUTO: 4.21 MIL/UL (ref 4–5.2)
SODIUM SERPL-SCNC: 143 MMOL/L (ref 136–145)
WBC NRBC COR # BLD AUTO: 14.8 K/UL (ref 4.3–11)

## 2023-04-23 RX ADMIN — QUETIAPINE SCH MG: 25 TABLET, FILM COATED ORAL at 21:43

## 2023-04-23 RX ADMIN — Medication PRN ML: at 19:51

## 2023-04-23 RX ADMIN — SODIUM CHLORIDE SCH MLS/HR: 4.5 INJECTION, SOLUTION INTRAVENOUS at 04:21

## 2023-04-23 RX ADMIN — Medication SCH EACH: at 12:45

## 2023-04-23 RX ADMIN — CLOTRIMAZOLE SCH APPLIC: 1 CREAM TOPICAL at 17:23

## 2023-04-23 RX ADMIN — Medication SCH APPLIC: at 09:32

## 2023-04-23 RX ADMIN — TRAMADOL HYDROCHLORIDE SCH MG: 50 TABLET, FILM COATED ORAL at 09:30

## 2023-04-23 RX ADMIN — INSULIN HUMAN PRN UNITS: 100 INJECTION, SOLUTION PARENTERAL at 00:41

## 2023-04-23 RX ADMIN — DEXTROSE MONOHYDRATE SCH MLS/HR: 50 INJECTION, SOLUTION INTRAVENOUS at 16:31

## 2023-04-23 RX ADMIN — ACETAMINOPHEN SCH MG: 160 SOLUTION ORAL at 09:27

## 2023-04-23 RX ADMIN — Medication SCH EACH: at 18:33

## 2023-04-23 RX ADMIN — ATORVASTATIN CALCIUM SCH MG: 40 TABLET, FILM COATED ORAL at 21:43

## 2023-04-23 RX ADMIN — INSULIN HUMAN PRN UNITS: 100 INJECTION, SOLUTION PARENTERAL at 06:16

## 2023-04-23 RX ADMIN — Medication SCH EACH: at 06:15

## 2023-04-23 RX ADMIN — DEXTROSE MONOHYDRATE SCH MLS/HR: 50 INJECTION, SOLUTION INTRAVENOUS at 04:22

## 2023-04-23 RX ADMIN — CEFEPIME HYDROCHLORIDE SCH MLS/HR: 1 INJECTION, POWDER, FOR SOLUTION INTRAMUSCULAR; INTRAVENOUS at 15:33

## 2023-04-23 RX ADMIN — LOSARTAN POTASSIUM SCH MG: 50 TABLET, FILM COATED ORAL at 09:29

## 2023-04-23 RX ADMIN — PANTOPRAZOLE SODIUM SCH MG: 40 GRANULE, DELAYED RELEASE ORAL at 11:44

## 2023-04-23 RX ADMIN — Medication SCH EACH: at 23:56

## 2023-04-23 RX ADMIN — SODIUM CHLORIDE SCH MLS/HR: 4.5 INJECTION, SOLUTION INTRAVENOUS at 17:23

## 2023-04-23 RX ADMIN — CLOTRIMAZOLE SCH APPLIC: 1 CREAM TOPICAL at 09:33

## 2023-04-23 RX ADMIN — LOSARTAN POTASSIUM SCH MG: 50 TABLET, FILM COATED ORAL at 17:00

## 2023-04-23 RX ADMIN — ENOXAPARIN SODIUM SCH MG: 40 INJECTION SUBCUTANEOUS at 09:31

## 2023-04-23 RX ADMIN — TRAMADOL HYDROCHLORIDE SCH MG: 50 TABLET, FILM COATED ORAL at 16:42

## 2023-04-23 RX ADMIN — DOCUSATE SODIUM SCH MG: 50 LIQUID ORAL at 09:27

## 2023-04-23 RX ADMIN — Medication SCH EACH: at 00:40

## 2023-04-23 RX ADMIN — ASPIRIN 81 MG SCH MG: 81 TABLET ORAL at 18:20

## 2023-04-23 RX ADMIN — AMLODIPINE BESYLATE SCH MG: 5 TABLET ORAL at 09:28

## 2023-04-24 VITALS — DIASTOLIC BLOOD PRESSURE: 60 MMHG | SYSTOLIC BLOOD PRESSURE: 122 MMHG

## 2023-04-24 VITALS — DIASTOLIC BLOOD PRESSURE: 80 MMHG | SYSTOLIC BLOOD PRESSURE: 136 MMHG

## 2023-04-24 VITALS — SYSTOLIC BLOOD PRESSURE: 111 MMHG | DIASTOLIC BLOOD PRESSURE: 81 MMHG

## 2023-04-24 VITALS — DIASTOLIC BLOOD PRESSURE: 75 MMHG | SYSTOLIC BLOOD PRESSURE: 147 MMHG

## 2023-04-24 VITALS — DIASTOLIC BLOOD PRESSURE: 87 MMHG | SYSTOLIC BLOOD PRESSURE: 149 MMHG

## 2023-04-24 VITALS — SYSTOLIC BLOOD PRESSURE: 114 MMHG | DIASTOLIC BLOOD PRESSURE: 42 MMHG

## 2023-04-24 LAB
BASOPHILS # BLD AUTO: 0.1 K/UL (ref 0–0.2)
BASOPHILS NFR BLD AUTO: 0.4 % (ref 0–2)
BUN SERPL-MCNC: 17 MG/DL (ref 7–18)
CALCIUM SERPL-MCNC: 8.9 MG/DL (ref 8.5–10.1)
CHLORIDE SERPL-SCNC: 110 MMOL/L (ref 98–107)
CO2 SERPL-SCNC: 25 MMOL/L (ref 21–32)
CREAT SERPL-MCNC: 0.6 MG/DL (ref 0.6–1.3)
EOSINOPHIL NFR BLD AUTO: 5.8 % (ref 0–6)
GLUCOSE SERPL-MCNC: 130 MG/DL (ref 74–106)
HCT VFR BLD AUTO: 32 % (ref 33–45)
HGB BLD-MCNC: 10 G/DL (ref 11.5–14.8)
LYMPHOCYTES NFR BLD AUTO: 1.3 K/UL (ref 0.8–4.8)
LYMPHOCYTES NFR BLD AUTO: 9.5 % (ref 20–44)
MCHC RBC AUTO-ENTMCNC: 31 G/DL (ref 31–36)
MCV RBC AUTO: 80 FL (ref 82–100)
MONOCYTES NFR BLD AUTO: 0.6 K/UL (ref 0.1–1.3)
MONOCYTES NFR BLD AUTO: 4.3 % (ref 2–12)
NEUTROPHILS # BLD AUTO: 11.2 K/UL (ref 1.8–8.9)
NEUTROPHILS NFR BLD AUTO: 80 % (ref 43–81)
PLATELET # BLD AUTO: 535 K/UL (ref 150–450)
POTASSIUM SERPL-SCNC: 3.8 MMOL/L (ref 3.5–5.1)
RBC # BLD AUTO: 4.06 MIL/UL (ref 4–5.2)
SODIUM SERPL-SCNC: 142 MMOL/L (ref 136–145)
WBC NRBC COR # BLD AUTO: 14 K/UL (ref 4.3–11)

## 2023-04-24 PROCEDURE — 0JB70ZZ EXCISION OF BACK SUBCUTANEOUS TISSUE AND FASCIA, OPEN APPROACH: ICD-10-PCS

## 2023-04-24 RX ADMIN — CLOTRIMAZOLE SCH APPLIC: 1 CREAM TOPICAL at 09:31

## 2023-04-24 RX ADMIN — ENOXAPARIN SODIUM SCH MG: 40 INJECTION SUBCUTANEOUS at 09:00

## 2023-04-24 RX ADMIN — SODIUM CHLORIDE SCH MLS/HR: 4.5 INJECTION, SOLUTION INTRAVENOUS at 05:17

## 2023-04-24 RX ADMIN — Medication SCH APPLIC: at 09:31

## 2023-04-24 RX ADMIN — DOCUSATE SODIUM SCH MG: 50 LIQUID ORAL at 09:32

## 2023-04-24 RX ADMIN — Medication SCH EACH: at 13:28

## 2023-04-24 RX ADMIN — TRAMADOL HYDROCHLORIDE SCH MG: 50 TABLET, FILM COATED ORAL at 09:32

## 2023-04-24 RX ADMIN — CEFEPIME HYDROCHLORIDE SCH MLS/HR: 1 INJECTION, POWDER, FOR SOLUTION INTRAMUSCULAR; INTRAVENOUS at 16:28

## 2023-04-24 RX ADMIN — ATORVASTATIN CALCIUM SCH MG: 40 TABLET, FILM COATED ORAL at 21:29

## 2023-04-24 RX ADMIN — PANTOPRAZOLE SODIUM SCH MG: 40 GRANULE, DELAYED RELEASE ORAL at 13:27

## 2023-04-24 RX ADMIN — Medication SCH EACH: at 18:16

## 2023-04-24 RX ADMIN — INSULIN HUMAN PRN UNITS: 100 INJECTION, SOLUTION PARENTERAL at 00:00

## 2023-04-24 RX ADMIN — ACETAMINOPHEN SCH MG: 160 SOLUTION ORAL at 09:32

## 2023-04-24 RX ADMIN — QUETIAPINE SCH MG: 25 TABLET, FILM COATED ORAL at 21:29

## 2023-04-24 RX ADMIN — Medication SCH EACH: at 06:42

## 2023-04-24 RX ADMIN — DEXTROSE MONOHYDRATE SCH MLS/HR: 50 INJECTION, SOLUTION INTRAVENOUS at 18:08

## 2023-04-24 RX ADMIN — INSULIN HUMAN PRN UNITS: 100 INJECTION, SOLUTION PARENTERAL at 06:42

## 2023-04-24 RX ADMIN — TRAMADOL HYDROCHLORIDE SCH MG: 50 TABLET, FILM COATED ORAL at 18:07

## 2023-04-24 RX ADMIN — DEXTROSE MONOHYDRATE SCH MLS/HR: 50 INJECTION, SOLUTION INTRAVENOUS at 18:09

## 2023-04-24 RX ADMIN — DEXTROSE MONOHYDRATE SCH MLS/HR: 50 INJECTION, SOLUTION INTRAVENOUS at 06:47

## 2023-04-24 RX ADMIN — ASPIRIN 81 MG SCH MG: 81 TABLET ORAL at 18:07

## 2023-04-24 RX ADMIN — CLOTRIMAZOLE SCH APPLIC: 1 CREAM TOPICAL at 18:08

## 2023-04-24 RX ADMIN — Medication PRN ML: at 10:55

## 2023-04-25 VITALS — SYSTOLIC BLOOD PRESSURE: 115 MMHG | DIASTOLIC BLOOD PRESSURE: 60 MMHG

## 2023-04-25 VITALS — DIASTOLIC BLOOD PRESSURE: 54 MMHG | SYSTOLIC BLOOD PRESSURE: 113 MMHG

## 2023-04-25 VITALS — SYSTOLIC BLOOD PRESSURE: 97 MMHG | DIASTOLIC BLOOD PRESSURE: 62 MMHG

## 2023-04-25 VITALS — DIASTOLIC BLOOD PRESSURE: 60 MMHG | SYSTOLIC BLOOD PRESSURE: 125 MMHG

## 2023-04-25 VITALS — DIASTOLIC BLOOD PRESSURE: 54 MMHG | SYSTOLIC BLOOD PRESSURE: 110 MMHG

## 2023-04-25 VITALS — SYSTOLIC BLOOD PRESSURE: 128 MMHG | DIASTOLIC BLOOD PRESSURE: 57 MMHG

## 2023-04-25 VITALS — DIASTOLIC BLOOD PRESSURE: 64 MMHG | SYSTOLIC BLOOD PRESSURE: 100 MMHG

## 2023-04-25 LAB
BASOPHILS # BLD AUTO: 0 K/UL (ref 0–0.2)
BASOPHILS NFR BLD AUTO: 0.3 % (ref 0–2)
BUN SERPL-MCNC: 17 MG/DL (ref 7–18)
CALCIUM SERPL-MCNC: 9.9 MG/DL (ref 8.5–10.1)
CHLORIDE SERPL-SCNC: 107 MMOL/L (ref 98–107)
CO2 SERPL-SCNC: 21 MMOL/L (ref 21–32)
CREAT SERPL-MCNC: 0.6 MG/DL (ref 0.6–1.3)
EOSINOPHIL NFR BLD AUTO: 6.2 % (ref 0–6)
GLUCOSE SERPL-MCNC: 116 MG/DL (ref 74–106)
HCT VFR BLD AUTO: 42 % (ref 33–45)
HGB BLD-MCNC: 12.4 G/DL (ref 11.5–14.8)
LYMPHOCYTES NFR BLD AUTO: 1.6 K/UL (ref 0.8–4.8)
LYMPHOCYTES NFR BLD AUTO: 11.6 % (ref 20–44)
MCHC RBC AUTO-ENTMCNC: 29 G/DL (ref 31–36)
MCV RBC AUTO: 85 FL (ref 82–100)
MONOCYTES NFR BLD AUTO: 0.9 K/UL (ref 0.1–1.3)
MONOCYTES NFR BLD AUTO: 7 % (ref 2–12)
NEUTROPHILS # BLD AUTO: 10.2 K/UL (ref 1.8–8.9)
NEUTROPHILS NFR BLD AUTO: 74.9 % (ref 43–81)
PLATELET # BLD AUTO: 520 K/UL (ref 150–450)
POTASSIUM SERPL-SCNC: 4.3 MMOL/L (ref 3.5–5.1)
RBC # BLD AUTO: 4.94 MIL/UL (ref 4–5.2)
SODIUM SERPL-SCNC: 138 MMOL/L (ref 136–145)
WBC NRBC COR # BLD AUTO: 13.6 K/UL (ref 4.3–11)

## 2023-04-25 RX ADMIN — Medication SCH EACH: at 17:47

## 2023-04-25 RX ADMIN — Medication SCH APPLIC: at 09:46

## 2023-04-25 RX ADMIN — TRAMADOL HYDROCHLORIDE SCH MG: 50 TABLET, FILM COATED ORAL at 18:23

## 2023-04-25 RX ADMIN — IVERMECTIN SCH MG: 3 TABLET ORAL at 17:22

## 2023-04-25 RX ADMIN — TRAMADOL HYDROCHLORIDE SCH MG: 50 TABLET, FILM COATED ORAL at 09:44

## 2023-04-25 RX ADMIN — INSULIN HUMAN PRN UNITS: 100 INJECTION, SOLUTION PARENTERAL at 23:22

## 2023-04-25 RX ADMIN — TRAMADOL HYDROCHLORIDE SCH MG: 50 TABLET, FILM COATED ORAL at 17:23

## 2023-04-25 RX ADMIN — DOCUSATE SODIUM SCH MG: 50 LIQUID ORAL at 09:44

## 2023-04-25 RX ADMIN — PANTOPRAZOLE SODIUM SCH MG: 40 GRANULE, DELAYED RELEASE ORAL at 12:18

## 2023-04-25 RX ADMIN — ATORVASTATIN CALCIUM SCH MG: 40 TABLET, FILM COATED ORAL at 22:02

## 2023-04-25 RX ADMIN — ACETAMINOPHEN SCH MG: 160 SOLUTION ORAL at 09:44

## 2023-04-25 RX ADMIN — Medication SCH EACH: at 00:51

## 2023-04-25 RX ADMIN — Medication SCH EACH: at 23:21

## 2023-04-25 RX ADMIN — Medication PRN ML: at 15:27

## 2023-04-25 RX ADMIN — Medication SCH EACH: at 06:07

## 2023-04-25 RX ADMIN — INSULIN HUMAN PRN UNITS: 100 INJECTION, SOLUTION PARENTERAL at 00:51

## 2023-04-25 RX ADMIN — CEFEPIME HYDROCHLORIDE SCH MLS/HR: 1 INJECTION, POWDER, FOR SOLUTION INTRAMUSCULAR; INTRAVENOUS at 15:35

## 2023-04-25 RX ADMIN — ASPIRIN 81 MG SCH MG: 81 TABLET ORAL at 17:22

## 2023-04-25 RX ADMIN — Medication SCH EACH: at 12:23

## 2023-04-25 RX ADMIN — INSULIN HUMAN PRN UNITS: 100 INJECTION, SOLUTION PARENTERAL at 06:07

## 2023-04-25 RX ADMIN — QUETIAPINE SCH MG: 25 TABLET, FILM COATED ORAL at 22:02

## 2023-04-25 RX ADMIN — DEXTROSE MONOHYDRATE SCH MLS/HR: 50 INJECTION, SOLUTION INTRAVENOUS at 05:50

## 2023-04-25 RX ADMIN — CLOTRIMAZOLE SCH APPLIC: 1 CREAM TOPICAL at 17:24

## 2023-04-25 RX ADMIN — DEXTROSE MONOHYDRATE SCH MLS/HR: 50 INJECTION, SOLUTION INTRAVENOUS at 17:30

## 2023-04-25 RX ADMIN — ENOXAPARIN SODIUM SCH MG: 40 INJECTION SUBCUTANEOUS at 09:45

## 2023-04-25 RX ADMIN — CLOTRIMAZOLE SCH APPLIC: 1 CREAM TOPICAL at 09:46

## 2023-04-26 VITALS — SYSTOLIC BLOOD PRESSURE: 99 MMHG | DIASTOLIC BLOOD PRESSURE: 56 MMHG

## 2023-04-26 VITALS — SYSTOLIC BLOOD PRESSURE: 112 MMHG | DIASTOLIC BLOOD PRESSURE: 70 MMHG

## 2023-04-26 VITALS — DIASTOLIC BLOOD PRESSURE: 73 MMHG | SYSTOLIC BLOOD PRESSURE: 101 MMHG

## 2023-04-26 VITALS — SYSTOLIC BLOOD PRESSURE: 108 MMHG | DIASTOLIC BLOOD PRESSURE: 66 MMHG

## 2023-04-26 VITALS — DIASTOLIC BLOOD PRESSURE: 53 MMHG | SYSTOLIC BLOOD PRESSURE: 102 MMHG

## 2023-04-26 LAB
BASOPHILS # BLD AUTO: 0.1 K/UL (ref 0–0.2)
BASOPHILS NFR BLD AUTO: 0.5 % (ref 0–2)
BUN SERPL-MCNC: 25 MG/DL (ref 7–18)
CALCIUM SERPL-MCNC: 9.4 MG/DL (ref 8.5–10.1)
CHLORIDE SERPL-SCNC: 107 MMOL/L (ref 98–107)
CO2 SERPL-SCNC: 27 MMOL/L (ref 21–32)
CREAT SERPL-MCNC: 0.6 MG/DL (ref 0.6–1.3)
EOSINOPHIL NFR BLD AUTO: 6.8 % (ref 0–6)
EOSINOPHIL NFR BLD MANUAL: 8 % (ref 0–4)
GLUCOSE SERPL-MCNC: 140 MG/DL (ref 74–106)
HCT VFR BLD AUTO: 36 % (ref 33–45)
HGB BLD-MCNC: 10.8 G/DL (ref 11.5–14.8)
LYMPHOCYTES NFR BLD AUTO: 13.4 % (ref 20–44)
LYMPHOCYTES NFR BLD AUTO: 2.1 K/UL (ref 0.8–4.8)
LYMPHOCYTES NFR BLD MANUAL: 11 % (ref 16–48)
MAGNESIUM SERPL-MCNC: 2.1 MG/DL (ref 1.8–2.4)
MCHC RBC AUTO-ENTMCNC: 30 G/DL (ref 31–36)
MCV RBC AUTO: 81 FL (ref 82–100)
MONOCYTES NFR BLD AUTO: 1 K/UL (ref 0.1–1.3)
MONOCYTES NFR BLD AUTO: 6.5 % (ref 2–12)
MONOCYTES NFR BLD MANUAL: 5 % (ref 0–11)
NEUTROPHILS # BLD AUTO: 11.2 K/UL (ref 1.8–8.9)
NEUTROPHILS NFR BLD AUTO: 72.8 % (ref 43–81)
NEUTS BAND NFR BLD MANUAL: 2 % (ref 0–5)
NEUTS SEG NFR BLD MANUAL: 74 % (ref 42–76)
PLATELET # BLD AUTO: 460 K/UL (ref 150–450)
POTASSIUM SERPL-SCNC: 4.2 MMOL/L (ref 3.5–5.1)
RBC # BLD AUTO: 4.39 MIL/UL (ref 4–5.2)
SODIUM SERPL-SCNC: 141 MMOL/L (ref 136–145)
WBC NRBC COR # BLD AUTO: 15.3 K/UL (ref 4.3–11)

## 2023-04-26 RX ADMIN — ACETAMINOPHEN SCH MG: 160 SOLUTION ORAL at 08:43

## 2023-04-26 RX ADMIN — CEFEPIME HYDROCHLORIDE SCH MLS/HR: 1 INJECTION, POWDER, FOR SOLUTION INTRAMUSCULAR; INTRAVENOUS at 16:02

## 2023-04-26 RX ADMIN — Medication SCH APPLIC: at 08:49

## 2023-04-26 RX ADMIN — INSULIN HUMAN PRN UNITS: 100 INJECTION, SOLUTION PARENTERAL at 17:08

## 2023-04-26 RX ADMIN — Medication SCH EACH: at 11:56

## 2023-04-26 RX ADMIN — INSULIN HUMAN PRN UNITS: 100 INJECTION, SOLUTION PARENTERAL at 05:23

## 2023-04-26 RX ADMIN — DOCUSATE SODIUM SCH MG: 50 LIQUID ORAL at 08:42

## 2023-04-26 RX ADMIN — CLOTRIMAZOLE SCH APPLIC: 1 CREAM TOPICAL at 08:49

## 2023-04-26 RX ADMIN — ATORVASTATIN CALCIUM SCH MG: 40 TABLET, FILM COATED ORAL at 21:14

## 2023-04-26 RX ADMIN — ASPIRIN 81 MG SCH MG: 81 TABLET ORAL at 17:16

## 2023-04-26 RX ADMIN — INSULIN HUMAN PRN UNITS: 100 INJECTION, SOLUTION PARENTERAL at 11:56

## 2023-04-26 RX ADMIN — TRAMADOL HYDROCHLORIDE SCH MG: 50 TABLET, FILM COATED ORAL at 08:43

## 2023-04-26 RX ADMIN — DEXTROSE MONOHYDRATE SCH MLS/HR: 50 INJECTION, SOLUTION INTRAVENOUS at 05:09

## 2023-04-26 RX ADMIN — CLOTRIMAZOLE SCH APPLIC: 1 CREAM TOPICAL at 17:41

## 2023-04-26 RX ADMIN — ENOXAPARIN SODIUM SCH MG: 40 INJECTION SUBCUTANEOUS at 08:44

## 2023-04-26 RX ADMIN — Medication SCH EACH: at 05:23

## 2023-04-26 RX ADMIN — Medication SCH EACH: at 17:06

## 2023-04-26 RX ADMIN — DEXTROSE MONOHYDRATE SCH MLS/HR: 50 INJECTION, SOLUTION INTRAVENOUS at 17:16

## 2023-04-26 RX ADMIN — PANTOPRAZOLE SODIUM SCH MG: 40 GRANULE, DELAYED RELEASE ORAL at 11:58

## 2023-04-26 RX ADMIN — QUETIAPINE SCH MG: 25 TABLET, FILM COATED ORAL at 21:14

## 2023-04-26 RX ADMIN — TRAMADOL HYDROCHLORIDE SCH MG: 50 TABLET, FILM COATED ORAL at 17:15

## 2023-04-26 RX ADMIN — Medication PRN ML: at 05:09

## 2023-04-27 VITALS — DIASTOLIC BLOOD PRESSURE: 76 MMHG | SYSTOLIC BLOOD PRESSURE: 115 MMHG

## 2023-04-27 VITALS — DIASTOLIC BLOOD PRESSURE: 50 MMHG | SYSTOLIC BLOOD PRESSURE: 122 MMHG

## 2023-04-27 VITALS — SYSTOLIC BLOOD PRESSURE: 131 MMHG | DIASTOLIC BLOOD PRESSURE: 86 MMHG

## 2023-04-27 VITALS — DIASTOLIC BLOOD PRESSURE: 71 MMHG | SYSTOLIC BLOOD PRESSURE: 130 MMHG

## 2023-04-27 LAB
BASOPHILS # BLD AUTO: 0.1 K/UL (ref 0–0.2)
BASOPHILS NFR BLD AUTO: 0.6 % (ref 0–2)
BUN SERPL-MCNC: 20 MG/DL (ref 7–18)
CALCIUM SERPL-MCNC: 9.7 MG/DL (ref 8.5–10.1)
CHLORIDE SERPL-SCNC: 103 MMOL/L (ref 98–107)
CO2 SERPL-SCNC: 26 MMOL/L (ref 21–32)
CREAT SERPL-MCNC: 0.7 MG/DL (ref 0.6–1.3)
EOSINOPHIL NFR BLD AUTO: 4 % (ref 0–6)
GLUCOSE SERPL-MCNC: 186 MG/DL (ref 74–106)
HCT VFR BLD AUTO: 34 % (ref 33–45)
HGB BLD-MCNC: 10.4 G/DL (ref 11.5–14.8)
LYMPHOCYTES NFR BLD AUTO: 1.4 K/UL (ref 0.8–4.8)
LYMPHOCYTES NFR BLD AUTO: 8.4 % (ref 20–44)
MCHC RBC AUTO-ENTMCNC: 30 G/DL (ref 31–36)
MCV RBC AUTO: 81 FL (ref 82–100)
MONOCYTES NFR BLD AUTO: 1 K/UL (ref 0.1–1.3)
MONOCYTES NFR BLD AUTO: 5.8 % (ref 2–12)
NEUTROPHILS # BLD AUTO: 13.7 K/UL (ref 1.8–8.9)
NEUTROPHILS NFR BLD AUTO: 81.2 % (ref 43–81)
PLATELET # BLD AUTO: 452 K/UL (ref 150–450)
POTASSIUM SERPL-SCNC: 3.8 MMOL/L (ref 3.5–5.1)
RBC # BLD AUTO: 4.25 MIL/UL (ref 4–5.2)
SODIUM SERPL-SCNC: 137 MMOL/L (ref 136–145)
WBC NRBC COR # BLD AUTO: 16.8 K/UL (ref 4.3–11)

## 2023-04-27 RX ADMIN — ATORVASTATIN CALCIUM SCH MG: 40 TABLET, FILM COATED ORAL at 21:20

## 2023-04-27 RX ADMIN — INSULIN HUMAN PRN UNITS: 100 INJECTION, SOLUTION PARENTERAL at 00:10

## 2023-04-27 RX ADMIN — INSULIN HUMAN PRN UNITS: 100 INJECTION, SOLUTION PARENTERAL at 17:56

## 2023-04-27 RX ADMIN — PANTOPRAZOLE SODIUM SCH MG: 40 GRANULE, DELAYED RELEASE ORAL at 11:53

## 2023-04-27 RX ADMIN — Medication SCH EACH: at 11:44

## 2023-04-27 RX ADMIN — Medication SCH EACH: at 23:55

## 2023-04-27 RX ADMIN — Medication SCH EACH: at 17:48

## 2023-04-27 RX ADMIN — DEXTROSE MONOHYDRATE SCH MLS/HR: 50 INJECTION, SOLUTION INTRAVENOUS at 05:03

## 2023-04-27 RX ADMIN — ENOXAPARIN SODIUM SCH MG: 40 INJECTION SUBCUTANEOUS at 08:34

## 2023-04-27 RX ADMIN — Medication SCH EACH: at 05:18

## 2023-04-27 RX ADMIN — DEXTROSE MONOHYDRATE SCH MLS/HR: 50 INJECTION, SOLUTION INTRAVENOUS at 17:50

## 2023-04-27 RX ADMIN — CEFEPIME HYDROCHLORIDE SCH MLS/HR: 1 INJECTION, POWDER, FOR SOLUTION INTRAMUSCULAR; INTRAVENOUS at 16:28

## 2023-04-27 RX ADMIN — Medication PRN ML: at 04:34

## 2023-04-27 RX ADMIN — Medication SCH APPLIC: at 08:23

## 2023-04-27 RX ADMIN — INSULIN HUMAN PRN UNITS: 100 INJECTION, SOLUTION PARENTERAL at 05:20

## 2023-04-27 RX ADMIN — DOCUSATE SODIUM SCH MG: 50 LIQUID ORAL at 08:23

## 2023-04-27 RX ADMIN — ASPIRIN 81 MG SCH MG: 81 TABLET ORAL at 17:51

## 2023-04-27 RX ADMIN — ACETAMINOPHEN SCH MG: 160 SOLUTION ORAL at 08:23

## 2023-04-27 RX ADMIN — Medication SCH EACH: at 00:10

## 2023-04-27 RX ADMIN — QUETIAPINE SCH MG: 25 TABLET, FILM COATED ORAL at 21:20

## 2023-04-27 RX ADMIN — INSULIN HUMAN PRN UNITS: 100 INJECTION, SOLUTION PARENTERAL at 11:50

## 2023-04-27 RX ADMIN — CLOTRIMAZOLE SCH APPLIC: 1 CREAM TOPICAL at 08:37

## 2023-04-27 RX ADMIN — INSULIN HUMAN PRN UNITS: 100 INJECTION, SOLUTION PARENTERAL at 23:55

## 2023-04-27 RX ADMIN — TRAMADOL HYDROCHLORIDE SCH MG: 50 TABLET, FILM COATED ORAL at 16:29

## 2023-04-27 RX ADMIN — CLOTRIMAZOLE SCH APPLIC: 1 CREAM TOPICAL at 16:29

## 2023-04-28 VITALS — DIASTOLIC BLOOD PRESSURE: 81 MMHG | SYSTOLIC BLOOD PRESSURE: 120 MMHG

## 2023-04-28 VITALS — DIASTOLIC BLOOD PRESSURE: 68 MMHG | SYSTOLIC BLOOD PRESSURE: 122 MMHG

## 2023-04-28 VITALS — SYSTOLIC BLOOD PRESSURE: 144 MMHG | DIASTOLIC BLOOD PRESSURE: 69 MMHG

## 2023-04-28 VITALS — SYSTOLIC BLOOD PRESSURE: 112 MMHG | DIASTOLIC BLOOD PRESSURE: 66 MMHG

## 2023-04-28 LAB
BASOPHILS # BLD AUTO: 0.1 K/UL (ref 0–0.2)
BASOPHILS NFR BLD AUTO: 0.6 % (ref 0–2)
BUN SERPL-MCNC: 22 MG/DL (ref 7–18)
CALCIUM SERPL-MCNC: 9.9 MG/DL (ref 8.5–10.1)
CHLORIDE SERPL-SCNC: 106 MMOL/L (ref 98–107)
CO2 SERPL-SCNC: 28 MMOL/L (ref 21–32)
CREAT SERPL-MCNC: 0.7 MG/DL (ref 0.6–1.3)
EOSINOPHIL NFR BLD AUTO: 5.4 % (ref 0–6)
GLUCOSE SERPL-MCNC: 161 MG/DL (ref 74–106)
HCT VFR BLD AUTO: 34 % (ref 33–45)
HGB BLD-MCNC: 10.1 G/DL (ref 11.5–14.8)
LYMPHOCYTES NFR BLD AUTO: 1.5 K/UL (ref 0.8–4.8)
LYMPHOCYTES NFR BLD AUTO: 9.1 % (ref 20–44)
MCHC RBC AUTO-ENTMCNC: 30 G/DL (ref 31–36)
MCV RBC AUTO: 80 FL (ref 82–100)
MONOCYTES NFR BLD AUTO: 1.1 K/UL (ref 0.1–1.3)
MONOCYTES NFR BLD AUTO: 7.2 % (ref 2–12)
NEUTROPHILS # BLD AUTO: 12.4 K/UL (ref 1.8–8.9)
NEUTROPHILS NFR BLD AUTO: 77.7 % (ref 43–81)
PLATELET # BLD AUTO: 444 K/UL (ref 150–450)
POTASSIUM SERPL-SCNC: 3.8 MMOL/L (ref 3.5–5.1)
RBC # BLD AUTO: 4.21 MIL/UL (ref 4–5.2)
SODIUM SERPL-SCNC: 140 MMOL/L (ref 136–145)
WBC NRBC COR # BLD AUTO: 15.9 K/UL (ref 4.3–11)

## 2023-04-28 RX ADMIN — INSULIN HUMAN PRN UNITS: 100 INJECTION, SOLUTION PARENTERAL at 05:24

## 2023-04-28 RX ADMIN — CEFEPIME HYDROCHLORIDE SCH MLS/HR: 1 INJECTION, POWDER, FOR SOLUTION INTRAMUSCULAR; INTRAVENOUS at 15:30

## 2023-04-28 RX ADMIN — Medication PRN ML: at 04:00

## 2023-04-28 RX ADMIN — ACETAMINOPHEN SCH MG: 160 SOLUTION ORAL at 09:24

## 2023-04-28 RX ADMIN — INSULIN HUMAN PRN UNITS: 100 INJECTION, SOLUTION PARENTERAL at 12:51

## 2023-04-28 RX ADMIN — DEXTROSE MONOHYDRATE SCH MLS/HR: 50 INJECTION, SOLUTION INTRAVENOUS at 17:32

## 2023-04-28 RX ADMIN — Medication SCH EACH: at 17:32

## 2023-04-28 RX ADMIN — Medication SCH APPLIC: at 09:25

## 2023-04-28 RX ADMIN — Medication SCH EACH: at 23:55

## 2023-04-28 RX ADMIN — TRAMADOL HYDROCHLORIDE SCH MG: 50 TABLET, FILM COATED ORAL at 09:23

## 2023-04-28 RX ADMIN — Medication SCH EACH: at 05:23

## 2023-04-28 RX ADMIN — INSULIN HUMAN PRN UNITS: 100 INJECTION, SOLUTION PARENTERAL at 23:56

## 2023-04-28 RX ADMIN — ENOXAPARIN SODIUM SCH MG: 40 INJECTION SUBCUTANEOUS at 09:24

## 2023-04-28 RX ADMIN — DEXTROSE MONOHYDRATE SCH MLS/HR: 50 INJECTION, SOLUTION INTRAVENOUS at 05:03

## 2023-04-28 RX ADMIN — ATORVASTATIN CALCIUM SCH MG: 40 TABLET, FILM COATED ORAL at 22:28

## 2023-04-28 RX ADMIN — TRAMADOL HYDROCHLORIDE SCH MG: 50 TABLET, FILM COATED ORAL at 16:50

## 2023-04-28 RX ADMIN — QUETIAPINE SCH MG: 25 TABLET, FILM COATED ORAL at 22:28

## 2023-04-28 RX ADMIN — ASPIRIN 81 MG SCH MG: 81 TABLET ORAL at 17:32

## 2023-04-28 RX ADMIN — DOCUSATE SODIUM SCH MG: 50 LIQUID ORAL at 09:22

## 2023-04-28 RX ADMIN — CLOTRIMAZOLE SCH APPLIC: 1 CREAM TOPICAL at 09:25

## 2023-04-28 RX ADMIN — Medication SCH EACH: at 11:49

## 2023-04-28 RX ADMIN — CLOTRIMAZOLE SCH APPLIC: 1 CREAM TOPICAL at 16:51

## 2023-04-28 RX ADMIN — PANTOPRAZOLE SODIUM SCH MG: 40 GRANULE, DELAYED RELEASE ORAL at 11:35

## 2023-04-29 VITALS — SYSTOLIC BLOOD PRESSURE: 120 MMHG | DIASTOLIC BLOOD PRESSURE: 64 MMHG

## 2023-04-29 VITALS — SYSTOLIC BLOOD PRESSURE: 145 MMHG | DIASTOLIC BLOOD PRESSURE: 79 MMHG

## 2023-04-29 VITALS — DIASTOLIC BLOOD PRESSURE: 68 MMHG | SYSTOLIC BLOOD PRESSURE: 119 MMHG

## 2023-04-29 VITALS — SYSTOLIC BLOOD PRESSURE: 129 MMHG | DIASTOLIC BLOOD PRESSURE: 68 MMHG

## 2023-04-29 VITALS — DIASTOLIC BLOOD PRESSURE: 68 MMHG | SYSTOLIC BLOOD PRESSURE: 107 MMHG

## 2023-04-29 VITALS — DIASTOLIC BLOOD PRESSURE: 60 MMHG | SYSTOLIC BLOOD PRESSURE: 129 MMHG

## 2023-04-29 LAB
BUN SERPL-MCNC: 28 MG/DL (ref 7–18)
CALCIUM SERPL-MCNC: 9.9 MG/DL (ref 8.5–10.1)
CHLORIDE SERPL-SCNC: 108 MMOL/L (ref 98–107)
CO2 SERPL-SCNC: 30 MMOL/L (ref 21–32)
CREAT SERPL-MCNC: 0.7 MG/DL (ref 0.6–1.3)
GLUCOSE SERPL-MCNC: 122 MG/DL (ref 74–106)
POTASSIUM SERPL-SCNC: 4.1 MMOL/L (ref 3.5–5.1)
SODIUM SERPL-SCNC: 143 MMOL/L (ref 136–145)

## 2023-04-29 RX ADMIN — ACETAMINOPHEN SCH MG: 160 SOLUTION ORAL at 09:01

## 2023-04-29 RX ADMIN — CLOTRIMAZOLE SCH APPLIC: 1 CREAM TOPICAL at 09:03

## 2023-04-29 RX ADMIN — INSULIN HUMAN PRN UNITS: 100 INJECTION, SOLUTION PARENTERAL at 18:11

## 2023-04-29 RX ADMIN — QUETIAPINE SCH MG: 25 TABLET, FILM COATED ORAL at 21:16

## 2023-04-29 RX ADMIN — INSULIN HUMAN PRN UNITS: 100 INJECTION, SOLUTION PARENTERAL at 06:12

## 2023-04-29 RX ADMIN — TRAMADOL HYDROCHLORIDE SCH MG: 50 TABLET, FILM COATED ORAL at 09:02

## 2023-04-29 RX ADMIN — ASPIRIN 81 MG SCH MG: 81 TABLET ORAL at 17:22

## 2023-04-29 RX ADMIN — DEXTROSE MONOHYDRATE SCH MLS/HR: 50 INJECTION, SOLUTION INTRAVENOUS at 06:00

## 2023-04-29 RX ADMIN — DOCUSATE SODIUM SCH MG: 50 LIQUID ORAL at 09:01

## 2023-04-29 RX ADMIN — Medication SCH EACH: at 11:34

## 2023-04-29 RX ADMIN — DIPHENHYDRAMINE HYDROCHLORIDE PRN MG: 12.5 SOLUTION ORAL at 10:27

## 2023-04-29 RX ADMIN — Medication SCH EACH: at 06:11

## 2023-04-29 RX ADMIN — Medication SCH APPLIC: at 09:02

## 2023-04-29 RX ADMIN — TRAMADOL HYDROCHLORIDE SCH MG: 50 TABLET, FILM COATED ORAL at 17:21

## 2023-04-29 RX ADMIN — CEFEPIME HYDROCHLORIDE SCH MLS/HR: 1 INJECTION, POWDER, FOR SOLUTION INTRAMUSCULAR; INTRAVENOUS at 15:58

## 2023-04-29 RX ADMIN — DEXTROSE MONOHYDRATE SCH MLS/HR: 50 INJECTION, SOLUTION INTRAVENOUS at 17:33

## 2023-04-29 RX ADMIN — ENOXAPARIN SODIUM SCH MG: 40 INJECTION SUBCUTANEOUS at 09:04

## 2023-04-29 RX ADMIN — VITAMIN A AND VITAMIN D SCH GM: 929.3 OINTMENT TOPICAL at 15:28

## 2023-04-29 RX ADMIN — Medication PRN ML: at 03:29

## 2023-04-29 RX ADMIN — PANTOPRAZOLE SODIUM SCH MG: 40 GRANULE, DELAYED RELEASE ORAL at 11:22

## 2023-04-29 RX ADMIN — Medication SCH EACH: at 17:53

## 2023-04-29 RX ADMIN — CLOTRIMAZOLE SCH APPLIC: 1 CREAM TOPICAL at 16:44

## 2023-04-29 RX ADMIN — VITAMIN A AND VITAMIN D SCH GM: 929.3 OINTMENT TOPICAL at 16:44

## 2023-04-29 RX ADMIN — ATORVASTATIN CALCIUM SCH MG: 40 TABLET, FILM COATED ORAL at 21:16

## 2023-04-30 VITALS — DIASTOLIC BLOOD PRESSURE: 70 MMHG | SYSTOLIC BLOOD PRESSURE: 147 MMHG

## 2023-04-30 VITALS — DIASTOLIC BLOOD PRESSURE: 66 MMHG | SYSTOLIC BLOOD PRESSURE: 105 MMHG

## 2023-04-30 VITALS — DIASTOLIC BLOOD PRESSURE: 59 MMHG | SYSTOLIC BLOOD PRESSURE: 101 MMHG

## 2023-04-30 VITALS — SYSTOLIC BLOOD PRESSURE: 125 MMHG | DIASTOLIC BLOOD PRESSURE: 67 MMHG

## 2023-04-30 VITALS — SYSTOLIC BLOOD PRESSURE: 120 MMHG | DIASTOLIC BLOOD PRESSURE: 58 MMHG

## 2023-04-30 LAB
ALBUMIN SERPL BCP-MCNC: 2.2 G/DL (ref 3.4–5)
ALP SERPL-CCNC: 98 U/L (ref 46–116)
ALT SERPL W P-5'-P-CCNC: 80 U/L (ref 12–78)
AST SERPL W P-5'-P-CCNC: 66 U/L (ref 15–37)
BASOPHILS # BLD AUTO: 0.1 K/UL (ref 0–0.2)
BASOPHILS NFR BLD AUTO: 0.4 % (ref 0–2)
BILIRUB SERPL-MCNC: 0.3 MG/DL (ref 0.2–1)
BUN SERPL-MCNC: 27 MG/DL (ref 7–18)
CALCIUM SERPL-MCNC: 10.3 MG/DL (ref 8.5–10.1)
CHLORIDE SERPL-SCNC: 108 MMOL/L (ref 98–107)
CO2 SERPL-SCNC: 27 MMOL/L (ref 21–32)
CREAT SERPL-MCNC: 0.6 MG/DL (ref 0.6–1.3)
EOSINOPHIL NFR BLD AUTO: 7.3 % (ref 0–6)
GLUCOSE SERPL-MCNC: 128 MG/DL (ref 74–106)
HCT VFR BLD AUTO: 33 % (ref 33–45)
HGB BLD-MCNC: 10.1 G/DL (ref 11.5–14.8)
LYMPHOCYTES NFR BLD AUTO: 1.7 K/UL (ref 0.8–4.8)
LYMPHOCYTES NFR BLD AUTO: 10.2 % (ref 20–44)
MCHC RBC AUTO-ENTMCNC: 30 G/DL (ref 31–36)
MCV RBC AUTO: 79 FL (ref 82–100)
MONOCYTES NFR BLD AUTO: 1 K/UL (ref 0.1–1.3)
MONOCYTES NFR BLD AUTO: 6.1 % (ref 2–12)
NEUTROPHILS # BLD AUTO: 13 K/UL (ref 1.8–8.9)
NEUTROPHILS NFR BLD AUTO: 76 % (ref 43–81)
PLATELET # BLD AUTO: 457 K/UL (ref 150–450)
POTASSIUM SERPL-SCNC: 4 MMOL/L (ref 3.5–5.1)
PROT SERPL-MCNC: 7.3 G/DL (ref 6.4–8.2)
RBC # BLD AUTO: 4.25 MIL/UL (ref 4–5.2)
SODIUM SERPL-SCNC: 143 MMOL/L (ref 136–145)
WBC NRBC COR # BLD AUTO: 17 K/UL (ref 4.3–11)

## 2023-04-30 RX ADMIN — Medication SCH APPLIC: at 08:54

## 2023-04-30 RX ADMIN — TRAMADOL HYDROCHLORIDE SCH MG: 50 TABLET, FILM COATED ORAL at 17:25

## 2023-04-30 RX ADMIN — Medication SCH EACH: at 06:07

## 2023-04-30 RX ADMIN — Medication SCH EACH: at 00:35

## 2023-04-30 RX ADMIN — ACETAMINOPHEN SCH MG: 160 SOLUTION ORAL at 08:49

## 2023-04-30 RX ADMIN — CLOTRIMAZOLE SCH APPLIC: 1 CREAM TOPICAL at 16:52

## 2023-04-30 RX ADMIN — VITAMIN A AND VITAMIN D SCH GM: 929.3 OINTMENT TOPICAL at 16:52

## 2023-04-30 RX ADMIN — Medication PRN ML: at 23:30

## 2023-04-30 RX ADMIN — TRAMADOL HYDROCHLORIDE SCH MG: 50 TABLET, FILM COATED ORAL at 08:49

## 2023-04-30 RX ADMIN — INSULIN HUMAN PRN UNITS: 100 INJECTION, SOLUTION PARENTERAL at 13:19

## 2023-04-30 RX ADMIN — DOCUSATE SODIUM SCH MG: 50 LIQUID ORAL at 08:49

## 2023-04-30 RX ADMIN — ENOXAPARIN SODIUM SCH MG: 40 INJECTION SUBCUTANEOUS at 08:53

## 2023-04-30 RX ADMIN — VITAMIN A AND VITAMIN D SCH GM: 929.3 OINTMENT TOPICAL at 08:53

## 2023-04-30 RX ADMIN — Medication SCH EACH: at 23:23

## 2023-04-30 RX ADMIN — PANTOPRAZOLE SODIUM SCH MG: 40 GRANULE, DELAYED RELEASE ORAL at 11:42

## 2023-04-30 RX ADMIN — ATORVASTATIN CALCIUM SCH MG: 40 TABLET, FILM COATED ORAL at 21:11

## 2023-04-30 RX ADMIN — Medication SCH EACH: at 11:42

## 2023-04-30 RX ADMIN — Medication SCH EACH: at 17:25

## 2023-04-30 RX ADMIN — CLOTRIMAZOLE SCH APPLIC: 1 CREAM TOPICAL at 08:53

## 2023-04-30 RX ADMIN — DEXTROSE MONOHYDRATE SCH MLS/HR: 50 INJECTION, SOLUTION INTRAVENOUS at 06:07

## 2023-04-30 RX ADMIN — ASPIRIN 81 MG SCH MG: 81 TABLET ORAL at 17:25

## 2023-04-30 RX ADMIN — QUETIAPINE SCH MG: 25 TABLET, FILM COATED ORAL at 21:11

## 2023-05-01 VITALS — SYSTOLIC BLOOD PRESSURE: 138 MMHG | DIASTOLIC BLOOD PRESSURE: 84 MMHG

## 2023-05-01 VITALS — DIASTOLIC BLOOD PRESSURE: 72 MMHG | SYSTOLIC BLOOD PRESSURE: 138 MMHG

## 2023-05-01 VITALS — SYSTOLIC BLOOD PRESSURE: 153 MMHG | DIASTOLIC BLOOD PRESSURE: 78 MMHG

## 2023-05-01 VITALS — DIASTOLIC BLOOD PRESSURE: 81 MMHG | SYSTOLIC BLOOD PRESSURE: 99 MMHG

## 2023-05-01 VITALS — DIASTOLIC BLOOD PRESSURE: 66 MMHG | SYSTOLIC BLOOD PRESSURE: 101 MMHG

## 2023-05-01 VITALS — DIASTOLIC BLOOD PRESSURE: 64 MMHG | SYSTOLIC BLOOD PRESSURE: 108 MMHG

## 2023-05-01 LAB
BUN SERPL-MCNC: 26 MG/DL (ref 7–18)
CALCIUM SERPL-MCNC: 10.3 MG/DL (ref 8.5–10.1)
CHLORIDE SERPL-SCNC: 111 MMOL/L (ref 98–107)
CO2 SERPL-SCNC: 26 MMOL/L (ref 21–32)
CREAT SERPL-MCNC: 0.6 MG/DL (ref 0.6–1.3)
GLUCOSE SERPL-MCNC: 126 MG/DL (ref 74–106)
POTASSIUM SERPL-SCNC: 3.9 MMOL/L (ref 3.5–5.1)
SODIUM SERPL-SCNC: 146 MMOL/L (ref 136–145)

## 2023-05-01 RX ADMIN — Medication SCH EACH: at 17:38

## 2023-05-01 RX ADMIN — DOCUSATE SODIUM SCH MG: 50 LIQUID ORAL at 09:36

## 2023-05-01 RX ADMIN — ACETAMINOPHEN SCH MG: 160 SOLUTION ORAL at 09:36

## 2023-05-01 RX ADMIN — CLOTRIMAZOLE SCH APPLIC: 1 CREAM TOPICAL at 10:21

## 2023-05-01 RX ADMIN — Medication SCH EACH: at 12:33

## 2023-05-01 RX ADMIN — VITAMIN A AND VITAMIN D SCH GM: 929.3 OINTMENT TOPICAL at 10:21

## 2023-05-01 RX ADMIN — PANTOPRAZOLE SODIUM SCH MG: 40 GRANULE, DELAYED RELEASE ORAL at 12:07

## 2023-05-01 RX ADMIN — CLOTRIMAZOLE SCH APPLIC: 1 CREAM TOPICAL at 17:38

## 2023-05-01 RX ADMIN — Medication SCH EACH: at 23:21

## 2023-05-01 RX ADMIN — Medication SCH EACH: at 06:14

## 2023-05-01 RX ADMIN — TRAMADOL HYDROCHLORIDE SCH MG: 50 TABLET, FILM COATED ORAL at 09:37

## 2023-05-01 RX ADMIN — VITAMIN A AND VITAMIN D SCH GM: 929.3 OINTMENT TOPICAL at 17:39

## 2023-05-01 RX ADMIN — INSULIN HUMAN PRN UNITS: 100 INJECTION, SOLUTION PARENTERAL at 23:38

## 2023-05-01 RX ADMIN — QUETIAPINE SCH MG: 25 TABLET, FILM COATED ORAL at 21:32

## 2023-05-01 RX ADMIN — ATORVASTATIN CALCIUM SCH MG: 40 TABLET, FILM COATED ORAL at 21:32

## 2023-05-01 RX ADMIN — ASPIRIN 81 MG SCH MG: 81 TABLET ORAL at 17:37

## 2023-05-01 RX ADMIN — Medication SCH APPLIC: at 10:20

## 2023-05-01 RX ADMIN — ENOXAPARIN SODIUM SCH MG: 40 INJECTION SUBCUTANEOUS at 09:41

## 2023-05-01 RX ADMIN — TRAMADOL HYDROCHLORIDE SCH MG: 50 TABLET, FILM COATED ORAL at 17:37

## 2023-05-02 VITALS — SYSTOLIC BLOOD PRESSURE: 138 MMHG | DIASTOLIC BLOOD PRESSURE: 71 MMHG

## 2023-05-02 VITALS — SYSTOLIC BLOOD PRESSURE: 141 MMHG | DIASTOLIC BLOOD PRESSURE: 72 MMHG

## 2023-05-02 VITALS — DIASTOLIC BLOOD PRESSURE: 67 MMHG | SYSTOLIC BLOOD PRESSURE: 146 MMHG

## 2023-05-02 VITALS — DIASTOLIC BLOOD PRESSURE: 66 MMHG | SYSTOLIC BLOOD PRESSURE: 118 MMHG

## 2023-05-02 VITALS — DIASTOLIC BLOOD PRESSURE: 61 MMHG | SYSTOLIC BLOOD PRESSURE: 116 MMHG

## 2023-05-02 RX ADMIN — DIPHENHYDRAMINE HYDROCHLORIDE PRN MG: 12.5 SOLUTION ORAL at 14:57

## 2023-05-02 RX ADMIN — VANCOMYCIN HYDROCHLORIDE SCH MG: 125 CAPSULE ORAL at 17:18

## 2023-05-02 RX ADMIN — Medication SCH EACH: at 18:03

## 2023-05-02 RX ADMIN — Medication PRN ML: at 02:16

## 2023-05-02 RX ADMIN — CLOTRIMAZOLE SCH APPLIC: 1 CREAM TOPICAL at 17:19

## 2023-05-02 RX ADMIN — PANTOPRAZOLE SODIUM SCH MG: 40 GRANULE, DELAYED RELEASE ORAL at 11:58

## 2023-05-02 RX ADMIN — Medication SCH APPLIC: at 09:15

## 2023-05-02 RX ADMIN — Medication SCH EACH: at 12:17

## 2023-05-02 RX ADMIN — Medication SCH EACH: at 05:24

## 2023-05-02 RX ADMIN — INSULIN HUMAN PRN UNITS: 100 INJECTION, SOLUTION PARENTERAL at 05:27

## 2023-05-02 RX ADMIN — ASPIRIN 81 MG SCH MG: 81 TABLET ORAL at 17:18

## 2023-05-02 RX ADMIN — VITAMIN A AND VITAMIN D SCH GM: 929.3 OINTMENT TOPICAL at 17:20

## 2023-05-02 RX ADMIN — QUETIAPINE SCH MG: 25 TABLET, FILM COATED ORAL at 21:29

## 2023-05-02 RX ADMIN — ATORVASTATIN CALCIUM SCH MG: 40 TABLET, FILM COATED ORAL at 21:31

## 2023-05-02 RX ADMIN — Medication PRN ML: at 22:42

## 2023-05-02 RX ADMIN — CLOTRIMAZOLE SCH APPLIC: 1 CREAM TOPICAL at 09:14

## 2023-05-02 RX ADMIN — ENOXAPARIN SODIUM SCH MG: 40 INJECTION SUBCUTANEOUS at 09:13

## 2023-05-02 RX ADMIN — ACETAMINOPHEN SCH MG: 160 SOLUTION ORAL at 09:12

## 2023-05-02 RX ADMIN — IVERMECTIN SCH MG: 3 TABLET ORAL at 16:09

## 2023-05-02 RX ADMIN — VITAMIN A AND VITAMIN D SCH GM: 929.3 OINTMENT TOPICAL at 09:15

## 2023-05-02 RX ADMIN — TRAMADOL HYDROCHLORIDE SCH MG: 50 TABLET, FILM COATED ORAL at 17:19

## 2023-05-02 RX ADMIN — TRAMADOL HYDROCHLORIDE SCH MG: 50 TABLET, FILM COATED ORAL at 09:13

## 2023-05-02 RX ADMIN — INSULIN HUMAN PRN UNITS: 100 INJECTION, SOLUTION PARENTERAL at 17:41

## 2023-05-02 RX ADMIN — DOCUSATE SODIUM SCH MG: 50 LIQUID ORAL at 09:12

## 2023-05-03 VITALS — SYSTOLIC BLOOD PRESSURE: 107 MMHG | DIASTOLIC BLOOD PRESSURE: 58 MMHG

## 2023-05-03 VITALS — SYSTOLIC BLOOD PRESSURE: 116 MMHG | DIASTOLIC BLOOD PRESSURE: 73 MMHG

## 2023-05-03 VITALS — DIASTOLIC BLOOD PRESSURE: 64 MMHG | SYSTOLIC BLOOD PRESSURE: 129 MMHG

## 2023-05-03 VITALS — SYSTOLIC BLOOD PRESSURE: 128 MMHG | DIASTOLIC BLOOD PRESSURE: 79 MMHG

## 2023-05-03 VITALS — DIASTOLIC BLOOD PRESSURE: 55 MMHG | SYSTOLIC BLOOD PRESSURE: 105 MMHG

## 2023-05-03 VITALS — DIASTOLIC BLOOD PRESSURE: 77 MMHG | SYSTOLIC BLOOD PRESSURE: 136 MMHG

## 2023-05-03 LAB
BASOPHILS # BLD AUTO: 0.1 K/UL (ref 0–0.2)
BASOPHILS NFR BLD AUTO: 0.6 % (ref 0–2)
BUN SERPL-MCNC: 32 MG/DL (ref 7–18)
CALCIUM SERPL-MCNC: 9.6 MG/DL (ref 8.5–10.1)
CHLORIDE SERPL-SCNC: 109 MMOL/L (ref 98–107)
CO2 SERPL-SCNC: 29 MMOL/L (ref 21–32)
CREAT SERPL-MCNC: 0.6 MG/DL (ref 0.6–1.3)
EOSINOPHIL NFR BLD AUTO: 4.9 % (ref 0–6)
GLUCOSE SERPL-MCNC: 135 MG/DL (ref 74–106)
HCT VFR BLD AUTO: 32 % (ref 33–45)
HGB BLD-MCNC: 10 G/DL (ref 11.5–14.8)
LYMPHOCYTES NFR BLD AUTO: 1.5 K/UL (ref 0.8–4.8)
LYMPHOCYTES NFR BLD AUTO: 9.2 % (ref 20–44)
MCHC RBC AUTO-ENTMCNC: 32 G/DL (ref 31–36)
MCV RBC AUTO: 78 FL (ref 82–100)
MONOCYTES NFR BLD AUTO: 1.1 K/UL (ref 0.1–1.3)
MONOCYTES NFR BLD AUTO: 6.4 % (ref 2–12)
NEUTROPHILS # BLD AUTO: 13.1 K/UL (ref 1.8–8.9)
NEUTROPHILS NFR BLD AUTO: 78.9 % (ref 43–81)
PLATELET # BLD AUTO: 393 K/UL (ref 150–450)
POTASSIUM SERPL-SCNC: 3.9 MMOL/L (ref 3.5–5.1)
RBC # BLD AUTO: 4.05 MIL/UL (ref 4–5.2)
SODIUM SERPL-SCNC: 145 MMOL/L (ref 136–145)
WBC NRBC COR # BLD AUTO: 16.7 K/UL (ref 4.3–11)

## 2023-05-03 RX ADMIN — VANCOMYCIN HYDROCHLORIDE SCH MG: 125 CAPSULE ORAL at 17:35

## 2023-05-03 RX ADMIN — INSULIN HUMAN PRN UNITS: 100 INJECTION, SOLUTION PARENTERAL at 23:21

## 2023-05-03 RX ADMIN — VANCOMYCIN HYDROCHLORIDE SCH MG: 125 CAPSULE ORAL at 12:11

## 2023-05-03 RX ADMIN — ENOXAPARIN SODIUM SCH MG: 40 INJECTION SUBCUTANEOUS at 10:15

## 2023-05-03 RX ADMIN — VANCOMYCIN HYDROCHLORIDE SCH MG: 125 CAPSULE ORAL at 00:18

## 2023-05-03 RX ADMIN — CLOTRIMAZOLE SCH APPLIC: 1 CREAM TOPICAL at 17:31

## 2023-05-03 RX ADMIN — VITAMIN A AND VITAMIN D SCH GM: 929.3 OINTMENT TOPICAL at 17:32

## 2023-05-03 RX ADMIN — PANTOPRAZOLE SODIUM SCH MG: 40 GRANULE, DELAYED RELEASE ORAL at 11:44

## 2023-05-03 RX ADMIN — Medication SCH EACH: at 23:21

## 2023-05-03 RX ADMIN — TRAMADOL HYDROCHLORIDE SCH MG: 50 TABLET, FILM COATED ORAL at 08:34

## 2023-05-03 RX ADMIN — Medication PRN ML: at 19:07

## 2023-05-03 RX ADMIN — CLOTRIMAZOLE SCH APPLIC: 1 CREAM TOPICAL at 08:35

## 2023-05-03 RX ADMIN — Medication SCH EACH: at 17:54

## 2023-05-03 RX ADMIN — VITAMIN A AND VITAMIN D SCH GM: 929.3 OINTMENT TOPICAL at 08:36

## 2023-05-03 RX ADMIN — QUETIAPINE SCH MG: 25 TABLET, FILM COATED ORAL at 21:45

## 2023-05-03 RX ADMIN — VANCOMYCIN HYDROCHLORIDE SCH MG: 125 CAPSULE ORAL at 23:40

## 2023-05-03 RX ADMIN — DOCUSATE SODIUM SCH MG: 50 LIQUID ORAL at 08:33

## 2023-05-03 RX ADMIN — Medication SCH EACH: at 05:23

## 2023-05-03 RX ADMIN — Medication SCH APPLIC: at 08:36

## 2023-05-03 RX ADMIN — TRAMADOL HYDROCHLORIDE SCH MG: 50 TABLET, FILM COATED ORAL at 17:31

## 2023-05-03 RX ADMIN — INSULIN HUMAN PRN UNITS: 100 INJECTION, SOLUTION PARENTERAL at 05:21

## 2023-05-03 RX ADMIN — ASPIRIN 81 MG SCH MG: 81 TABLET ORAL at 17:31

## 2023-05-03 RX ADMIN — ATORVASTATIN CALCIUM SCH MG: 40 TABLET, FILM COATED ORAL at 21:45

## 2023-05-03 RX ADMIN — VANCOMYCIN HYDROCHLORIDE SCH MG: 125 CAPSULE ORAL at 05:22

## 2023-05-03 RX ADMIN — Medication SCH EACH: at 00:30

## 2023-05-03 RX ADMIN — ACETAMINOPHEN SCH MG: 160 SOLUTION ORAL at 08:34

## 2023-05-03 RX ADMIN — Medication SCH EACH: at 11:56

## 2023-05-04 VITALS — DIASTOLIC BLOOD PRESSURE: 66 MMHG | SYSTOLIC BLOOD PRESSURE: 131 MMHG

## 2023-05-04 VITALS — DIASTOLIC BLOOD PRESSURE: 71 MMHG | SYSTOLIC BLOOD PRESSURE: 152 MMHG

## 2023-05-04 VITALS — SYSTOLIC BLOOD PRESSURE: 143 MMHG | DIASTOLIC BLOOD PRESSURE: 63 MMHG

## 2023-05-04 VITALS — SYSTOLIC BLOOD PRESSURE: 151 MMHG | DIASTOLIC BLOOD PRESSURE: 62 MMHG

## 2023-05-04 LAB
BASOPHILS # BLD AUTO: 0.1 K/UL (ref 0–0.2)
BASOPHILS NFR BLD AUTO: 0.6 % (ref 0–2)
BILIRUB UR QL STRIP: NEGATIVE
BUN SERPL-MCNC: 25 MG/DL (ref 7–18)
CALCIUM SERPL-MCNC: 9.3 MG/DL (ref 8.5–10.1)
CHLORIDE SERPL-SCNC: 109 MMOL/L (ref 98–107)
CO2 SERPL-SCNC: 25 MMOL/L (ref 21–32)
COLOR UR: YELLOW
CREAT SERPL-MCNC: 0.7 MG/DL (ref 0.6–1.3)
DEPRECATED SQUAMOUS URNS QL MICRO: (no result) /HPF
EOSINOPHIL NFR BLD AUTO: 0.3 % (ref 0–6)
GLUCOSE SERPL-MCNC: 139 MG/DL (ref 74–106)
GLUCOSE UR STRIP-MCNC: NEGATIVE MG/DL
HCT VFR BLD AUTO: 32 % (ref 33–45)
HGB BLD-MCNC: 9.7 G/DL (ref 11.5–14.8)
LEUKOCYTE ESTERASE UR QL STRIP: (no result)
LYMPHOCYTES NFR BLD AUTO: 1.1 K/UL (ref 0.8–4.8)
LYMPHOCYTES NFR BLD AUTO: 7.1 % (ref 20–44)
MCHC RBC AUTO-ENTMCNC: 30 G/DL (ref 31–36)
MCV RBC AUTO: 80 FL (ref 82–100)
MONOCYTES NFR BLD AUTO: 1 K/UL (ref 0.1–1.3)
MONOCYTES NFR BLD AUTO: 6.4 % (ref 2–12)
NEUTROPHILS # BLD AUTO: 13.8 K/UL (ref 1.8–8.9)
NEUTROPHILS NFR BLD AUTO: 85.6 % (ref 43–81)
NITRITE UR QL STRIP: NEGATIVE
PH UR STRIP: 8 [PH] (ref 5–8)
PLATELET # BLD AUTO: 353 K/UL (ref 150–450)
POTASSIUM SERPL-SCNC: 3.7 MMOL/L (ref 3.5–5.1)
PROT UR QL STRIP: (no result) MG/DL
RBC # BLD AUTO: 4.05 MIL/UL (ref 4–5.2)
SODIUM SERPL-SCNC: 144 MMOL/L (ref 136–145)
UROBILINOGEN UR STRIP-MCNC: 0.2 EU/DL
WBC #/AREA URNS HPF: (no result) /HPF
WBC #/AREA URNS HPF: (no result) /HPF (ref 0–3)
WBC NRBC COR # BLD AUTO: 16.1 K/UL (ref 4.3–11)

## 2023-05-04 RX ADMIN — ENOXAPARIN SODIUM SCH MG: 40 INJECTION SUBCUTANEOUS at 08:57

## 2023-05-04 RX ADMIN — Medication SCH EACH: at 12:07

## 2023-05-04 RX ADMIN — Medication SCH APPLIC: at 09:02

## 2023-05-04 RX ADMIN — QUETIAPINE SCH MG: 25 TABLET, FILM COATED ORAL at 21:11

## 2023-05-04 RX ADMIN — ATORVASTATIN CALCIUM SCH MG: 40 TABLET, FILM COATED ORAL at 21:11

## 2023-05-04 RX ADMIN — Medication PRN ML: at 13:19

## 2023-05-04 RX ADMIN — ASPIRIN 81 MG SCH MG: 81 TABLET ORAL at 17:09

## 2023-05-04 RX ADMIN — ACETAMINOPHEN SCH MG: 160 SOLUTION ORAL at 08:57

## 2023-05-04 RX ADMIN — TRAMADOL HYDROCHLORIDE SCH MG: 50 TABLET, FILM COATED ORAL at 16:11

## 2023-05-04 RX ADMIN — PANTOPRAZOLE SODIUM SCH MG: 40 GRANULE, DELAYED RELEASE ORAL at 12:07

## 2023-05-04 RX ADMIN — VANCOMYCIN HYDROCHLORIDE SCH MG: 125 CAPSULE ORAL at 17:13

## 2023-05-04 RX ADMIN — INSULIN HUMAN PRN UNITS: 100 INJECTION, SOLUTION PARENTERAL at 05:14

## 2023-05-04 RX ADMIN — CLOTRIMAZOLE SCH APPLIC: 1 CREAM TOPICAL at 16:12

## 2023-05-04 RX ADMIN — VITAMIN A AND VITAMIN D SCH APPLIC: 929.3 OINTMENT TOPICAL at 09:03

## 2023-05-04 RX ADMIN — CLOTRIMAZOLE SCH APPLIC: 1 CREAM TOPICAL at 09:00

## 2023-05-04 RX ADMIN — VANCOMYCIN HYDROCHLORIDE SCH MG: 125 CAPSULE ORAL at 12:22

## 2023-05-04 RX ADMIN — Medication SCH EACH: at 05:11

## 2023-05-04 RX ADMIN — Medication SCH EACH: at 17:09

## 2023-05-04 RX ADMIN — DOCUSATE SODIUM SCH MG: 50 LIQUID ORAL at 08:57

## 2023-05-04 RX ADMIN — VITAMIN A AND VITAMIN D SCH APPLIC: 929.3 OINTMENT TOPICAL at 16:13

## 2023-05-04 RX ADMIN — VANCOMYCIN HYDROCHLORIDE SCH MG: 125 CAPSULE ORAL at 05:12

## 2023-05-04 RX ADMIN — TRAMADOL HYDROCHLORIDE SCH MG: 50 TABLET, FILM COATED ORAL at 08:58

## 2023-05-05 VITALS — SYSTOLIC BLOOD PRESSURE: 128 MMHG | DIASTOLIC BLOOD PRESSURE: 77 MMHG

## 2023-05-05 VITALS — SYSTOLIC BLOOD PRESSURE: 134 MMHG

## 2023-05-05 LAB
BASOPHILS # BLD AUTO: 0.1 K/UL (ref 0–0.2)
BASOPHILS NFR BLD AUTO: 0.5 % (ref 0–2)
BUN SERPL-MCNC: 22 MG/DL (ref 7–18)
CALCIUM SERPL-MCNC: 9.6 MG/DL (ref 8.5–10.1)
CHLORIDE SERPL-SCNC: 111 MMOL/L (ref 98–107)
CO2 SERPL-SCNC: 29 MMOL/L (ref 21–32)
CREAT SERPL-MCNC: 0.6 MG/DL (ref 0.6–1.3)
CRP SERPL-MCNC: 22 MG/DL (ref 0–0.9)
EOSINOPHIL NFR BLD AUTO: 1.9 % (ref 0–6)
GLUCOSE SERPL-MCNC: 130 MG/DL (ref 74–106)
HCT VFR BLD AUTO: 30 % (ref 33–45)
HGB BLD-MCNC: 9.3 G/DL (ref 11.5–14.8)
LYMPHOCYTES NFR BLD AUTO: 1.4 K/UL (ref 0.8–4.8)
LYMPHOCYTES NFR BLD AUTO: 8.5 % (ref 20–44)
MCHC RBC AUTO-ENTMCNC: 31 G/DL (ref 31–36)
MCV RBC AUTO: 78 FL (ref 82–100)
MONOCYTES NFR BLD AUTO: 1.3 K/UL (ref 0.1–1.3)
MONOCYTES NFR BLD AUTO: 8.1 % (ref 2–12)
NEUTROPHILS # BLD AUTO: 13.3 K/UL (ref 1.8–8.9)
NEUTROPHILS NFR BLD AUTO: 81 % (ref 43–81)
PLATELET # BLD AUTO: 372 K/UL (ref 150–450)
POTASSIUM SERPL-SCNC: 3.7 MMOL/L (ref 3.5–5.1)
RBC # BLD AUTO: 3.86 MIL/UL (ref 4–5.2)
SODIUM SERPL-SCNC: 148 MMOL/L (ref 136–145)
WBC NRBC COR # BLD AUTO: 16.4 K/UL (ref 4.3–11)

## 2023-05-05 RX ADMIN — ATORVASTATIN CALCIUM SCH MG: 40 TABLET, FILM COATED ORAL at 21:44

## 2023-05-05 RX ADMIN — QUETIAPINE SCH MG: 25 TABLET, FILM COATED ORAL at 21:44

## 2023-05-05 RX ADMIN — Medication SCH EACH: at 18:08

## 2023-05-05 RX ADMIN — VITAMIN A AND VITAMIN D SCH APPLIC: 929.3 OINTMENT TOPICAL at 09:21

## 2023-05-05 RX ADMIN — Medication SCH APPLIC: at 09:20

## 2023-05-05 RX ADMIN — PANTOPRAZOLE SODIUM SCH MG: 40 GRANULE, DELAYED RELEASE ORAL at 13:00

## 2023-05-05 RX ADMIN — CLOTRIMAZOLE SCH APPLIC: 1 CREAM TOPICAL at 16:59

## 2023-05-05 RX ADMIN — METRONIDAZOLE SCH MG: 500 TABLET ORAL at 21:44

## 2023-05-05 RX ADMIN — INSULIN HUMAN PRN UNITS: 100 INJECTION, SOLUTION PARENTERAL at 00:18

## 2023-05-05 RX ADMIN — VANCOMYCIN HYDROCHLORIDE SCH MG: 125 CAPSULE ORAL at 23:47

## 2023-05-05 RX ADMIN — INSULIN HUMAN PRN UNITS: 100 INJECTION, SOLUTION PARENTERAL at 23:58

## 2023-05-05 RX ADMIN — VANCOMYCIN HYDROCHLORIDE SCH MG: 125 CAPSULE ORAL at 12:00

## 2023-05-05 RX ADMIN — VANCOMYCIN HYDROCHLORIDE SCH MG: 125 CAPSULE ORAL at 18:33

## 2023-05-05 RX ADMIN — DOCUSATE SODIUM SCH MG: 50 LIQUID ORAL at 08:12

## 2023-05-05 RX ADMIN — INSULIN HUMAN PRN UNITS: 100 INJECTION, SOLUTION PARENTERAL at 05:01

## 2023-05-05 RX ADMIN — ACETAMINOPHEN SCH MG: 160 SOLUTION ORAL at 08:12

## 2023-05-05 RX ADMIN — ASPIRIN 81 MG SCH MG: 81 TABLET ORAL at 18:08

## 2023-05-05 RX ADMIN — Medication SCH EACH: at 23:55

## 2023-05-05 RX ADMIN — VITAMIN A AND VITAMIN D SCH APPLIC: 929.3 OINTMENT TOPICAL at 16:59

## 2023-05-05 RX ADMIN — TRAMADOL HYDROCHLORIDE SCH MG: 50 TABLET, FILM COATED ORAL at 16:24

## 2023-05-05 RX ADMIN — TRAMADOL HYDROCHLORIDE SCH MG: 50 TABLET, FILM COATED ORAL at 08:16

## 2023-05-05 RX ADMIN — VANCOMYCIN HYDROCHLORIDE SCH MG: 125 CAPSULE ORAL at 08:11

## 2023-05-05 RX ADMIN — Medication PRN ML: at 02:44

## 2023-05-05 RX ADMIN — Medication SCH EACH: at 12:32

## 2023-05-05 RX ADMIN — Medication SCH EACH: at 05:01

## 2023-05-05 RX ADMIN — FUROSEMIDE SCH MG: 10 INJECTION INTRAVENOUS at 16:58

## 2023-05-05 RX ADMIN — CLOTRIMAZOLE SCH APPLIC: 1 CREAM TOPICAL at 09:20

## 2023-05-05 RX ADMIN — CEFEPIME HYDROCHLORIDE SCH MLS/HR: 1 INJECTION, POWDER, FOR SOLUTION INTRAMUSCULAR; INTRAVENOUS at 16:07

## 2023-05-05 RX ADMIN — ENOXAPARIN SODIUM SCH MG: 40 INJECTION SUBCUTANEOUS at 08:14

## 2023-05-05 RX ADMIN — VANCOMYCIN HYDROCHLORIDE SCH MG: 125 CAPSULE ORAL at 00:38

## 2023-05-05 RX ADMIN — Medication SCH EACH: at 00:17

## 2023-05-06 VITALS — SYSTOLIC BLOOD PRESSURE: 147 MMHG | DIASTOLIC BLOOD PRESSURE: 70 MMHG

## 2023-05-06 VITALS — SYSTOLIC BLOOD PRESSURE: 139 MMHG | DIASTOLIC BLOOD PRESSURE: 75 MMHG

## 2023-05-06 VITALS — DIASTOLIC BLOOD PRESSURE: 83 MMHG | SYSTOLIC BLOOD PRESSURE: 152 MMHG

## 2023-05-06 LAB
BUN SERPL-MCNC: 37 MG/DL (ref 7–18)
CALCIUM SERPL-MCNC: 10.1 MG/DL (ref 8.5–10.1)
CHLORIDE SERPL-SCNC: 107 MMOL/L (ref 98–107)
CO2 SERPL-SCNC: 28 MMOL/L (ref 21–32)
CREAT SERPL-MCNC: 0.8 MG/DL (ref 0.6–1.3)
GLUCOSE SERPL-MCNC: 184 MG/DL (ref 74–106)
POTASSIUM SERPL-SCNC: 3.7 MMOL/L (ref 3.5–5.1)
SODIUM SERPL-SCNC: 147 MMOL/L (ref 136–145)

## 2023-05-06 RX ADMIN — Medication PRN ML: at 03:36

## 2023-05-06 RX ADMIN — PANTOPRAZOLE SODIUM SCH MG: 40 GRANULE, DELAYED RELEASE ORAL at 10:40

## 2023-05-06 RX ADMIN — Medication SCH APPLIC: at 09:52

## 2023-05-06 RX ADMIN — CEFEPIME HYDROCHLORIDE SCH MLS/HR: 1 INJECTION, POWDER, FOR SOLUTION INTRAMUSCULAR; INTRAVENOUS at 14:57

## 2023-05-06 RX ADMIN — QUETIAPINE SCH MG: 25 TABLET, FILM COATED ORAL at 21:26

## 2023-05-06 RX ADMIN — Medication SCH EACH: at 18:05

## 2023-05-06 RX ADMIN — VANCOMYCIN HYDROCHLORIDE SCH MG: 125 CAPSULE ORAL at 23:46

## 2023-05-06 RX ADMIN — VANCOMYCIN HYDROCHLORIDE SCH MG: 125 CAPSULE ORAL at 18:09

## 2023-05-06 RX ADMIN — TRAMADOL HYDROCHLORIDE SCH MG: 50 TABLET, FILM COATED ORAL at 08:48

## 2023-05-06 RX ADMIN — VANCOMYCIN HYDROCHLORIDE SCH MG: 125 CAPSULE ORAL at 12:06

## 2023-05-06 RX ADMIN — ATORVASTATIN CALCIUM SCH MG: 40 TABLET, FILM COATED ORAL at 21:26

## 2023-05-06 RX ADMIN — ASPIRIN 81 MG SCH MG: 81 TABLET ORAL at 17:43

## 2023-05-06 RX ADMIN — METRONIDAZOLE SCH MG: 500 TABLET ORAL at 21:26

## 2023-05-06 RX ADMIN — DOCUSATE SODIUM SCH MG: 50 LIQUID ORAL at 08:48

## 2023-05-06 RX ADMIN — METRONIDAZOLE SCH MG: 500 TABLET ORAL at 05:04

## 2023-05-06 RX ADMIN — INSULIN HUMAN PRN UNITS: 100 INJECTION, SOLUTION PARENTERAL at 12:09

## 2023-05-06 RX ADMIN — INSULIN HUMAN PRN UNITS: 100 INJECTION, SOLUTION PARENTERAL at 18:08

## 2023-05-06 RX ADMIN — DEXTROSE MONOHYDRATE SCH MLS/HR: 50 INJECTION, SOLUTION INTRAVENOUS at 15:36

## 2023-05-06 RX ADMIN — VANCOMYCIN HYDROCHLORIDE SCH MG: 125 CAPSULE ORAL at 05:07

## 2023-05-06 RX ADMIN — INSULIN HUMAN PRN UNITS: 100 INJECTION, SOLUTION PARENTERAL at 06:36

## 2023-05-06 RX ADMIN — VITAMIN A AND VITAMIN D SCH APPLIC: 929.3 OINTMENT TOPICAL at 09:55

## 2023-05-06 RX ADMIN — VITAMIN A AND VITAMIN D SCH APPLIC: 929.3 OINTMENT TOPICAL at 17:43

## 2023-05-06 RX ADMIN — CLOTRIMAZOLE SCH APPLIC: 1 CREAM TOPICAL at 09:52

## 2023-05-06 RX ADMIN — CARVEDILOL SCH MG: 3.12 TABLET, FILM COATED ORAL at 17:48

## 2023-05-06 RX ADMIN — DEXTROSE MONOHYDRATE SCH MLS/HR: 50 INJECTION, SOLUTION INTRAVENOUS at 03:01

## 2023-05-06 RX ADMIN — TRAMADOL HYDROCHLORIDE SCH MG: 50 TABLET, FILM COATED ORAL at 17:44

## 2023-05-06 RX ADMIN — Medication SCH EACH: at 06:13

## 2023-05-06 RX ADMIN — FUROSEMIDE SCH MG: 10 INJECTION INTRAVENOUS at 08:48

## 2023-05-06 RX ADMIN — CLOTRIMAZOLE SCH APPLIC: 1 CREAM TOPICAL at 17:38

## 2023-05-06 RX ADMIN — Medication PRN ML: at 21:41

## 2023-05-06 RX ADMIN — ACETAMINOPHEN SCH MG: 160 SOLUTION ORAL at 08:48

## 2023-05-06 RX ADMIN — ENOXAPARIN SODIUM SCH MG: 40 INJECTION SUBCUTANEOUS at 08:50

## 2023-05-06 RX ADMIN — Medication SCH EACH: at 11:28

## 2023-05-06 RX ADMIN — FUROSEMIDE SCH MG: 10 INJECTION INTRAVENOUS at 17:44

## 2023-05-06 RX ADMIN — METRONIDAZOLE SCH MG: 500 TABLET ORAL at 12:07

## 2023-05-07 VITALS — SYSTOLIC BLOOD PRESSURE: 127 MMHG | DIASTOLIC BLOOD PRESSURE: 78 MMHG

## 2023-05-07 VITALS — DIASTOLIC BLOOD PRESSURE: 68 MMHG | SYSTOLIC BLOOD PRESSURE: 123 MMHG

## 2023-05-07 VITALS — DIASTOLIC BLOOD PRESSURE: 68 MMHG | SYSTOLIC BLOOD PRESSURE: 125 MMHG

## 2023-05-07 LAB
BASOPHILS # BLD AUTO: 0.1 K/UL (ref 0–0.2)
BASOPHILS NFR BLD AUTO: 0.5 % (ref 0–2)
BUN SERPL-MCNC: 37 MG/DL (ref 7–18)
CALCIUM SERPL-MCNC: 9.8 MG/DL (ref 8.5–10.1)
CHLORIDE SERPL-SCNC: 109 MMOL/L (ref 98–107)
CHOLEST SERPL-MCNC: 97 MG/DL (ref ?–200)
CO2 SERPL-SCNC: 32 MMOL/L (ref 21–32)
CREAT SERPL-MCNC: 0.7 MG/DL (ref 0.6–1.3)
EOSINOPHIL NFR BLD AUTO: 2.7 % (ref 0–6)
GLUCOSE SERPL-MCNC: 145 MG/DL (ref 74–106)
HCT VFR BLD AUTO: 34 % (ref 33–45)
HDLC SERPL-MCNC: 23 MG/DL (ref 40–60)
HGB BLD-MCNC: 10.1 G/DL (ref 11.5–14.8)
LDLC SERPL DIRECT ASSAY-MCNC: 65 MG/DL (ref 0–99)
LYMPHOCYTES NFR BLD AUTO: 1.1 K/UL (ref 0.8–4.8)
LYMPHOCYTES NFR BLD AUTO: 7.8 % (ref 20–44)
MAGNESIUM SERPL-MCNC: 2.5 MG/DL (ref 1.8–2.4)
MCHC RBC AUTO-ENTMCNC: 30 G/DL (ref 31–36)
MCV RBC AUTO: 78 FL (ref 82–100)
MONOCYTES NFR BLD AUTO: 1.2 K/UL (ref 0.1–1.3)
MONOCYTES NFR BLD AUTO: 8.7 % (ref 2–12)
NEUTROPHILS # BLD AUTO: 11.3 K/UL (ref 1.8–8.9)
NEUTROPHILS NFR BLD AUTO: 80.3 % (ref 43–81)
PLATELET # BLD AUTO: 403 K/UL (ref 150–450)
POTASSIUM SERPL-SCNC: 3.9 MMOL/L (ref 3.5–5.1)
RBC # BLD AUTO: 4.33 MIL/UL (ref 4–5.2)
SODIUM SERPL-SCNC: 146 MMOL/L (ref 136–145)
TRIGL SERPL-MCNC: 98 MG/DL (ref 30–150)
WBC NRBC COR # BLD AUTO: 14 K/UL (ref 4.3–11)

## 2023-05-07 RX ADMIN — ACETAMINOPHEN SCH MG: 160 SOLUTION ORAL at 08:50

## 2023-05-07 RX ADMIN — INSULIN HUMAN PRN UNITS: 100 INJECTION, SOLUTION PARENTERAL at 06:05

## 2023-05-07 RX ADMIN — VANCOMYCIN HYDROCHLORIDE SCH MG: 125 CAPSULE ORAL at 11:25

## 2023-05-07 RX ADMIN — VITAMIN A AND VITAMIN D SCH APPLIC: 929.3 OINTMENT TOPICAL at 17:20

## 2023-05-07 RX ADMIN — TRAMADOL HYDROCHLORIDE SCH MG: 50 TABLET, FILM COATED ORAL at 08:51

## 2023-05-07 RX ADMIN — ATORVASTATIN CALCIUM SCH MG: 40 TABLET, FILM COATED ORAL at 21:14

## 2023-05-07 RX ADMIN — CARVEDILOL SCH MG: 3.12 TABLET, FILM COATED ORAL at 17:12

## 2023-05-07 RX ADMIN — CARVEDILOL SCH MG: 3.12 TABLET, FILM COATED ORAL at 08:51

## 2023-05-07 RX ADMIN — DEXTROSE MONOHYDRATE SCH MLS/HR: 50 INJECTION, SOLUTION INTRAVENOUS at 03:00

## 2023-05-07 RX ADMIN — Medication SCH EACH: at 11:24

## 2023-05-07 RX ADMIN — DOCUSATE SODIUM SCH MG: 50 LIQUID ORAL at 08:50

## 2023-05-07 RX ADMIN — VITAMIN A AND VITAMIN D SCH APPLIC: 929.3 OINTMENT TOPICAL at 08:53

## 2023-05-07 RX ADMIN — VANCOMYCIN HYDROCHLORIDE SCH MG: 125 CAPSULE ORAL at 17:11

## 2023-05-07 RX ADMIN — Medication SCH EACH: at 00:30

## 2023-05-07 RX ADMIN — METRONIDAZOLE SCH MG: 500 TABLET ORAL at 14:04

## 2023-05-07 RX ADMIN — CEFEPIME HYDROCHLORIDE SCH MLS/HR: 1 INJECTION, POWDER, FOR SOLUTION INTRAMUSCULAR; INTRAVENOUS at 15:03

## 2023-05-07 RX ADMIN — METRONIDAZOLE SCH MG: 500 TABLET ORAL at 21:14

## 2023-05-07 RX ADMIN — INSULIN HUMAN PRN UNITS: 100 INJECTION, SOLUTION PARENTERAL at 00:38

## 2023-05-07 RX ADMIN — Medication SCH EACH: at 23:20

## 2023-05-07 RX ADMIN — VANCOMYCIN HYDROCHLORIDE SCH MG: 125 CAPSULE ORAL at 23:05

## 2023-05-07 RX ADMIN — CLOTRIMAZOLE SCH APPLIC: 1 CREAM TOPICAL at 08:52

## 2023-05-07 RX ADMIN — QUETIAPINE SCH MG: 25 TABLET, FILM COATED ORAL at 21:14

## 2023-05-07 RX ADMIN — TRAMADOL HYDROCHLORIDE SCH MG: 50 TABLET, FILM COATED ORAL at 17:11

## 2023-05-07 RX ADMIN — Medication SCH EACH: at 17:12

## 2023-05-07 RX ADMIN — METRONIDAZOLE SCH MG: 500 TABLET ORAL at 05:48

## 2023-05-07 RX ADMIN — CLOTRIMAZOLE SCH APPLIC: 1 CREAM TOPICAL at 17:19

## 2023-05-07 RX ADMIN — FUROSEMIDE SCH MG: 10 INJECTION INTRAVENOUS at 08:51

## 2023-05-07 RX ADMIN — INSULIN HUMAN PRN UNITS: 100 INJECTION, SOLUTION PARENTERAL at 17:13

## 2023-05-07 RX ADMIN — PANTOPRAZOLE SODIUM SCH MG: 40 GRANULE, DELAYED RELEASE ORAL at 11:24

## 2023-05-07 RX ADMIN — Medication SCH APPLIC: at 08:52

## 2023-05-07 RX ADMIN — INSULIN HUMAN PRN UNITS: 100 INJECTION, SOLUTION PARENTERAL at 23:25

## 2023-05-07 RX ADMIN — INSULIN HUMAN PRN UNITS: 100 INJECTION, SOLUTION PARENTERAL at 11:35

## 2023-05-07 RX ADMIN — ASPIRIN 81 MG SCH MG: 81 TABLET ORAL at 17:11

## 2023-05-07 RX ADMIN — DEXTROSE MONOHYDRATE SCH MLS/HR: 50 INJECTION, SOLUTION INTRAVENOUS at 15:31

## 2023-05-07 RX ADMIN — Medication SCH EACH: at 05:48

## 2023-05-07 RX ADMIN — LOSARTAN POTASSIUM SCH MG: 25 TABLET, FILM COATED ORAL at 08:50

## 2023-05-07 RX ADMIN — VANCOMYCIN HYDROCHLORIDE SCH MG: 125 CAPSULE ORAL at 05:48

## 2023-05-08 VITALS — DIASTOLIC BLOOD PRESSURE: 71 MMHG | SYSTOLIC BLOOD PRESSURE: 136 MMHG

## 2023-05-08 VITALS — SYSTOLIC BLOOD PRESSURE: 113 MMHG | DIASTOLIC BLOOD PRESSURE: 66 MMHG

## 2023-05-08 VITALS — DIASTOLIC BLOOD PRESSURE: 54 MMHG | SYSTOLIC BLOOD PRESSURE: 126 MMHG

## 2023-05-08 LAB
BUN SERPL-MCNC: 39 MG/DL (ref 7–18)
CALCIUM SERPL-MCNC: 9.8 MG/DL (ref 8.5–10.1)
CHLORIDE SERPL-SCNC: 110 MMOL/L (ref 98–107)
CO2 SERPL-SCNC: 29 MMOL/L (ref 21–32)
CREAT SERPL-MCNC: 0.8 MG/DL (ref 0.6–1.3)
GLUCOSE SERPL-MCNC: 133 MG/DL (ref 74–106)
POTASSIUM SERPL-SCNC: 3.7 MMOL/L (ref 3.5–5.1)
SODIUM SERPL-SCNC: 148 MMOL/L (ref 136–145)

## 2023-05-08 RX ADMIN — VITAMIN A AND VITAMIN D SCH APPLIC: 929.3 OINTMENT TOPICAL at 09:35

## 2023-05-08 RX ADMIN — METRONIDAZOLE SCH MG: 500 TABLET ORAL at 13:07

## 2023-05-08 RX ADMIN — CLOTRIMAZOLE SCH APPLIC: 1 CREAM TOPICAL at 17:29

## 2023-05-08 RX ADMIN — VITAMIN A AND VITAMIN D SCH APPLIC: 929.3 OINTMENT TOPICAL at 17:29

## 2023-05-08 RX ADMIN — TRAMADOL HYDROCHLORIDE SCH MG: 50 TABLET, FILM COATED ORAL at 17:19

## 2023-05-08 RX ADMIN — CARVEDILOL SCH MG: 3.12 TABLET, FILM COATED ORAL at 17:20

## 2023-05-08 RX ADMIN — Medication SCH EACH: at 11:37

## 2023-05-08 RX ADMIN — CLOTRIMAZOLE SCH APPLIC: 1 CREAM TOPICAL at 09:34

## 2023-05-08 RX ADMIN — ASPIRIN 81 MG SCH MG: 81 TABLET ORAL at 17:19

## 2023-05-08 RX ADMIN — Medication SCH EACH: at 05:22

## 2023-05-08 RX ADMIN — Medication SCH EACH: at 17:20

## 2023-05-08 RX ADMIN — VANCOMYCIN HYDROCHLORIDE SCH MG: 125 CAPSULE ORAL at 05:04

## 2023-05-08 RX ADMIN — DOCUSATE SODIUM SCH MG: 50 LIQUID ORAL at 08:46

## 2023-05-08 RX ADMIN — ATORVASTATIN CALCIUM SCH MG: 40 TABLET, FILM COATED ORAL at 21:43

## 2023-05-08 RX ADMIN — VANCOMYCIN HYDROCHLORIDE SCH MG: 125 CAPSULE ORAL at 23:06

## 2023-05-08 RX ADMIN — METRONIDAZOLE SCH MG: 500 TABLET ORAL at 21:43

## 2023-05-08 RX ADMIN — VANCOMYCIN HYDROCHLORIDE SCH MG: 125 CAPSULE ORAL at 17:42

## 2023-05-08 RX ADMIN — LOSARTAN POTASSIUM SCH MG: 25 TABLET, FILM COATED ORAL at 08:47

## 2023-05-08 RX ADMIN — PANTOPRAZOLE SODIUM SCH MG: 40 GRANULE, DELAYED RELEASE ORAL at 11:36

## 2023-05-08 RX ADMIN — INSULIN HUMAN PRN UNITS: 100 INJECTION, SOLUTION PARENTERAL at 17:22

## 2023-05-08 RX ADMIN — INSULIN HUMAN PRN UNITS: 100 INJECTION, SOLUTION PARENTERAL at 11:41

## 2023-05-08 RX ADMIN — TRAMADOL HYDROCHLORIDE SCH MG: 50 TABLET, FILM COATED ORAL at 08:47

## 2023-05-08 RX ADMIN — Medication PRN ML: at 19:07

## 2023-05-08 RX ADMIN — INSULIN HUMAN PRN UNITS: 100 INJECTION, SOLUTION PARENTERAL at 05:25

## 2023-05-08 RX ADMIN — METRONIDAZOLE SCH MG: 500 TABLET ORAL at 04:09

## 2023-05-08 RX ADMIN — Medication SCH APPLIC: at 09:34

## 2023-05-08 RX ADMIN — ACETAMINOPHEN SCH MG: 160 SOLUTION ORAL at 08:47

## 2023-05-08 RX ADMIN — VANCOMYCIN HYDROCHLORIDE SCH MG: 125 CAPSULE ORAL at 11:37

## 2023-05-08 RX ADMIN — CEFEPIME HYDROCHLORIDE SCH MLS/HR: 1 INJECTION, POWDER, FOR SOLUTION INTRAMUSCULAR; INTRAVENOUS at 14:43

## 2023-05-08 RX ADMIN — DEXTROSE MONOHYDRATE SCH MLS/HR: 50 INJECTION, SOLUTION INTRAVENOUS at 15:02

## 2023-05-08 RX ADMIN — CARVEDILOL SCH MG: 3.12 TABLET, FILM COATED ORAL at 08:47

## 2023-05-08 RX ADMIN — QUETIAPINE SCH MG: 25 TABLET, FILM COATED ORAL at 21:43

## 2023-05-08 RX ADMIN — Medication SCH EACH: at 23:26

## 2023-05-09 VITALS — SYSTOLIC BLOOD PRESSURE: 148 MMHG | DIASTOLIC BLOOD PRESSURE: 76 MMHG

## 2023-05-09 VITALS — SYSTOLIC BLOOD PRESSURE: 125 MMHG | DIASTOLIC BLOOD PRESSURE: 68 MMHG

## 2023-05-09 VITALS — SYSTOLIC BLOOD PRESSURE: 140 MMHG | DIASTOLIC BLOOD PRESSURE: 66 MMHG

## 2023-05-09 LAB
ALBUMIN SERPL BCP-MCNC: 1.8 G/DL (ref 3.4–5)
ALP SERPL-CCNC: 87 U/L (ref 46–116)
ALT SERPL W P-5'-P-CCNC: 60 U/L (ref 12–78)
AST SERPL W P-5'-P-CCNC: 41 U/L (ref 15–37)
BASOPHILS # BLD AUTO: 0.1 K/UL (ref 0–0.2)
BASOPHILS NFR BLD AUTO: 0.6 % (ref 0–2)
BILIRUB SERPL-MCNC: 0.3 MG/DL (ref 0.2–1)
BUN SERPL-MCNC: 36 MG/DL (ref 7–18)
BUN SERPL-MCNC: 36 MG/DL (ref 7–18)
CALCIUM SERPL-MCNC: 9.6 MG/DL (ref 8.5–10.1)
CALCIUM SERPL-MCNC: 9.8 MG/DL (ref 8.5–10.1)
CHLORIDE SERPL-SCNC: 109 MMOL/L (ref 98–107)
CHLORIDE SERPL-SCNC: 111 MMOL/L (ref 98–107)
CO2 SERPL-SCNC: 29 MMOL/L (ref 21–32)
CO2 SERPL-SCNC: 31 MMOL/L (ref 21–32)
CREAT SERPL-MCNC: 0.6 MG/DL (ref 0.6–1.3)
CREAT SERPL-MCNC: 0.7 MG/DL (ref 0.6–1.3)
EOSINOPHIL NFR BLD AUTO: 2.8 % (ref 0–6)
GLUCOSE SERPL-MCNC: 134 MG/DL (ref 74–106)
GLUCOSE SERPL-MCNC: 160 MG/DL (ref 74–106)
HCT VFR BLD AUTO: 34 % (ref 33–45)
HGB BLD-MCNC: 10 G/DL (ref 11.5–14.8)
LYMPHOCYTES NFR BLD AUTO: 1.6 K/UL (ref 0.8–4.8)
LYMPHOCYTES NFR BLD AUTO: 11.1 % (ref 20–44)
MAGNESIUM SERPL-MCNC: 2.4 MG/DL (ref 1.8–2.4)
MCHC RBC AUTO-ENTMCNC: 30 G/DL (ref 31–36)
MCV RBC AUTO: 77 FL (ref 82–100)
MONOCYTES NFR BLD AUTO: 1.1 K/UL (ref 0.1–1.3)
MONOCYTES NFR BLD AUTO: 7.7 % (ref 2–12)
NEUTROPHILS # BLD AUTO: 11.1 K/UL (ref 1.8–8.9)
NEUTROPHILS NFR BLD AUTO: 77.8 % (ref 43–81)
PHOSPHATE SERPL-MCNC: 2.9 MG/DL (ref 2.5–4.9)
PLATELET # BLD AUTO: 459 K/UL (ref 150–450)
POTASSIUM SERPL-SCNC: 3 MMOL/L (ref 3.5–5.1)
POTASSIUM SERPL-SCNC: 3.5 MMOL/L (ref 3.5–5.1)
PROT SERPL-MCNC: 7 G/DL (ref 6.4–8.2)
RBC # BLD AUTO: 4.32 MIL/UL (ref 4–5.2)
SODIUM SERPL-SCNC: 146 MMOL/L (ref 136–145)
SODIUM SERPL-SCNC: 147 MMOL/L (ref 136–145)
WBC NRBC COR # BLD AUTO: 14.2 K/UL (ref 4.3–11)

## 2023-05-09 RX ADMIN — ATORVASTATIN CALCIUM SCH MG: 40 TABLET, FILM COATED ORAL at 21:27

## 2023-05-09 RX ADMIN — INSULIN HUMAN PRN UNITS: 100 INJECTION, SOLUTION PARENTERAL at 00:30

## 2023-05-09 RX ADMIN — TRAMADOL HYDROCHLORIDE SCH MG: 50 TABLET, FILM COATED ORAL at 08:41

## 2023-05-09 RX ADMIN — Medication SCH APPLIC: at 08:33

## 2023-05-09 RX ADMIN — VANCOMYCIN HYDROCHLORIDE SCH MG: 125 CAPSULE ORAL at 12:40

## 2023-05-09 RX ADMIN — CLOTRIMAZOLE SCH APPLIC: 1 CREAM TOPICAL at 17:12

## 2023-05-09 RX ADMIN — CARVEDILOL SCH MG: 3.12 TABLET, FILM COATED ORAL at 08:44

## 2023-05-09 RX ADMIN — VANCOMYCIN HYDROCHLORIDE SCH MG: 125 CAPSULE ORAL at 17:28

## 2023-05-09 RX ADMIN — QUETIAPINE SCH MG: 25 TABLET, FILM COATED ORAL at 21:27

## 2023-05-09 RX ADMIN — INSULIN HUMAN PRN UNITS: 100 INJECTION, SOLUTION PARENTERAL at 05:52

## 2023-05-09 RX ADMIN — DEXTROSE MONOHYDRATE SCH MLS/HR: 50 INJECTION, SOLUTION INTRAVENOUS at 15:46

## 2023-05-09 RX ADMIN — TRAMADOL HYDROCHLORIDE SCH MG: 50 TABLET, FILM COATED ORAL at 17:12

## 2023-05-09 RX ADMIN — METRONIDAZOLE SCH MG: 500 TABLET ORAL at 21:27

## 2023-05-09 RX ADMIN — VITAMIN A AND VITAMIN D SCH APPLIC: 929.3 OINTMENT TOPICAL at 17:12

## 2023-05-09 RX ADMIN — CEFEPIME HYDROCHLORIDE SCH MLS/HR: 1 INJECTION, POWDER, FOR SOLUTION INTRAMUSCULAR; INTRAVENOUS at 15:43

## 2023-05-09 RX ADMIN — METRONIDAZOLE SCH MG: 500 TABLET ORAL at 04:44

## 2023-05-09 RX ADMIN — METRONIDAZOLE SCH MG: 500 TABLET ORAL at 12:56

## 2023-05-09 RX ADMIN — ASPIRIN 81 MG SCH MG: 81 TABLET ORAL at 17:17

## 2023-05-09 RX ADMIN — VITAMIN A AND VITAMIN D SCH APPLIC: 929.3 OINTMENT TOPICAL at 10:22

## 2023-05-09 RX ADMIN — DOCUSATE SODIUM SCH MG: 50 LIQUID ORAL at 08:41

## 2023-05-09 RX ADMIN — PANTOPRAZOLE SODIUM SCH MG: 40 GRANULE, DELAYED RELEASE ORAL at 11:38

## 2023-05-09 RX ADMIN — VANCOMYCIN HYDROCHLORIDE SCH MG: 125 CAPSULE ORAL at 23:37

## 2023-05-09 RX ADMIN — Medication SCH EACH: at 05:52

## 2023-05-09 RX ADMIN — VANCOMYCIN HYDROCHLORIDE SCH MG: 125 CAPSULE ORAL at 05:15

## 2023-05-09 RX ADMIN — LOSARTAN POTASSIUM SCH MG: 25 TABLET, FILM COATED ORAL at 08:43

## 2023-05-09 RX ADMIN — Medication SCH EACH: at 17:28

## 2023-05-09 RX ADMIN — CLOTRIMAZOLE SCH APPLIC: 1 CREAM TOPICAL at 08:34

## 2023-05-09 RX ADMIN — ACETAMINOPHEN SCH MG: 160 SOLUTION ORAL at 08:41

## 2023-05-09 RX ADMIN — Medication SCH EACH: at 11:34

## 2023-05-09 RX ADMIN — CARVEDILOL SCH MG: 3.12 TABLET, FILM COATED ORAL at 17:11

## 2023-05-10 VITALS — SYSTOLIC BLOOD PRESSURE: 124 MMHG | DIASTOLIC BLOOD PRESSURE: 88 MMHG

## 2023-05-10 VITALS — SYSTOLIC BLOOD PRESSURE: 124 MMHG | DIASTOLIC BLOOD PRESSURE: 62 MMHG

## 2023-05-10 VITALS — DIASTOLIC BLOOD PRESSURE: 62 MMHG | SYSTOLIC BLOOD PRESSURE: 104 MMHG

## 2023-05-10 LAB
ALBUMIN SERPL BCP-MCNC: 2 G/DL (ref 3.4–5)
ALP SERPL-CCNC: 98 U/L (ref 46–116)
ALT SERPL W P-5'-P-CCNC: 52 U/L (ref 12–78)
AST SERPL W P-5'-P-CCNC: 40 U/L (ref 15–37)
BILIRUB SERPL-MCNC: 0.2 MG/DL (ref 0.2–1)
BUN SERPL-MCNC: 37 MG/DL (ref 7–18)
CALCIUM SERPL-MCNC: 10 MG/DL (ref 8.5–10.1)
CHLORIDE SERPL-SCNC: 114 MMOL/L (ref 98–107)
CO2 SERPL-SCNC: 27 MMOL/L (ref 21–32)
CREAT SERPL-MCNC: 0.7 MG/DL (ref 0.6–1.3)
GLUCOSE SERPL-MCNC: 132 MG/DL (ref 74–106)
POTASSIUM SERPL-SCNC: 4.3 MMOL/L (ref 3.5–5.1)
PROT SERPL-MCNC: 7.2 G/DL (ref 6.4–8.2)
SODIUM SERPL-SCNC: 149 MMOL/L (ref 136–145)

## 2023-05-10 PROCEDURE — 0JB70ZZ EXCISION OF BACK SUBCUTANEOUS TISSUE AND FASCIA, OPEN APPROACH: ICD-10-PCS

## 2023-05-10 RX ADMIN — ATORVASTATIN CALCIUM SCH MG: 40 TABLET, FILM COATED ORAL at 21:45

## 2023-05-10 RX ADMIN — TRAMADOL HYDROCHLORIDE SCH MG: 50 TABLET, FILM COATED ORAL at 08:53

## 2023-05-10 RX ADMIN — ACETAMINOPHEN SCH MG: 160 SOLUTION ORAL at 08:53

## 2023-05-10 RX ADMIN — METRONIDAZOLE SCH MG: 500 TABLET ORAL at 13:59

## 2023-05-10 RX ADMIN — Medication SCH EACH: at 06:26

## 2023-05-10 RX ADMIN — TRAMADOL HYDROCHLORIDE SCH MG: 50 TABLET, FILM COATED ORAL at 17:15

## 2023-05-10 RX ADMIN — VITAMIN A AND VITAMIN D SCH APPLIC: 929.3 OINTMENT TOPICAL at 08:56

## 2023-05-10 RX ADMIN — VANCOMYCIN HYDROCHLORIDE SCH MG: 125 CAPSULE ORAL at 17:17

## 2023-05-10 RX ADMIN — VITAMIN A AND VITAMIN D SCH APPLIC: 929.3 OINTMENT TOPICAL at 17:16

## 2023-05-10 RX ADMIN — PANTOPRAZOLE SODIUM SCH MG: 40 GRANULE, DELAYED RELEASE ORAL at 11:32

## 2023-05-10 RX ADMIN — Medication PRN ML: at 05:57

## 2023-05-10 RX ADMIN — DEXTROSE MONOHYDRATE SCH MLS/HR: 50 INJECTION, SOLUTION INTRAVENOUS at 16:06

## 2023-05-10 RX ADMIN — LOSARTAN POTASSIUM SCH MG: 25 TABLET, FILM COATED ORAL at 08:54

## 2023-05-10 RX ADMIN — CLOTRIMAZOLE SCH APPLIC: 1 CREAM TOPICAL at 17:15

## 2023-05-10 RX ADMIN — METRONIDAZOLE SCH MG: 500 TABLET ORAL at 05:21

## 2023-05-10 RX ADMIN — INSULIN HUMAN PRN UNITS: 100 INJECTION, SOLUTION PARENTERAL at 00:37

## 2023-05-10 RX ADMIN — VANCOMYCIN HYDROCHLORIDE SCH MG: 125 CAPSULE ORAL at 11:37

## 2023-05-10 RX ADMIN — VANCOMYCIN HYDROCHLORIDE SCH MG: 125 CAPSULE ORAL at 05:54

## 2023-05-10 RX ADMIN — INSULIN HUMAN PRN UNITS: 100 INJECTION, SOLUTION PARENTERAL at 17:22

## 2023-05-10 RX ADMIN — ASPIRIN 81 MG SCH MG: 81 TABLET ORAL at 17:14

## 2023-05-10 RX ADMIN — DOCUSATE SODIUM SCH MG: 50 LIQUID ORAL at 08:53

## 2023-05-10 RX ADMIN — METRONIDAZOLE SCH MG: 500 TABLET ORAL at 21:45

## 2023-05-10 RX ADMIN — Medication SCH EACH: at 11:37

## 2023-05-10 RX ADMIN — CARVEDILOL SCH MG: 3.12 TABLET, FILM COATED ORAL at 17:00

## 2023-05-10 RX ADMIN — CEFEPIME HYDROCHLORIDE SCH MLS/HR: 1 INJECTION, POWDER, FOR SOLUTION INTRAMUSCULAR; INTRAVENOUS at 15:30

## 2023-05-10 RX ADMIN — QUETIAPINE SCH MG: 25 TABLET, FILM COATED ORAL at 21:45

## 2023-05-10 RX ADMIN — Medication SCH EACH: at 00:37

## 2023-05-10 RX ADMIN — VANCOMYCIN HYDROCHLORIDE SCH MG: 125 CAPSULE ORAL at 23:56

## 2023-05-10 RX ADMIN — CLOTRIMAZOLE SCH APPLIC: 1 CREAM TOPICAL at 08:56

## 2023-05-10 RX ADMIN — CARVEDILOL SCH MG: 3.12 TABLET, FILM COATED ORAL at 08:54

## 2023-05-10 RX ADMIN — INSULIN HUMAN PRN UNITS: 100 INJECTION, SOLUTION PARENTERAL at 06:27

## 2023-05-10 RX ADMIN — Medication SCH EACH: at 17:17

## 2023-05-10 RX ADMIN — Medication SCH APPLIC: at 08:55

## 2023-05-11 VITALS — DIASTOLIC BLOOD PRESSURE: 81 MMHG | SYSTOLIC BLOOD PRESSURE: 144 MMHG

## 2023-05-11 LAB
BUN SERPL-MCNC: 35 MG/DL (ref 7–18)
CALCIUM SERPL-MCNC: 9.2 MG/DL (ref 8.5–10.1)
CHLORIDE SERPL-SCNC: 112 MMOL/L (ref 98–107)
CO2 SERPL-SCNC: 27 MMOL/L (ref 21–32)
CREAT SERPL-MCNC: 0.7 MG/DL (ref 0.6–1.3)
GLUCOSE SERPL-MCNC: 150 MG/DL (ref 74–106)
POTASSIUM SERPL-SCNC: 3.9 MMOL/L (ref 3.5–5.1)
SODIUM SERPL-SCNC: 146 MMOL/L (ref 136–145)

## 2023-05-11 PROCEDURE — 05H933Z INSERTION OF INFUSION DEVICE INTO RIGHT BRACHIAL VEIN, PERCUTANEOUS APPROACH: ICD-10-PCS | Performed by: NURSE PRACTITIONER

## 2023-05-11 RX ADMIN — Medication SCH APPLIC: at 09:32

## 2023-05-11 RX ADMIN — CARVEDILOL SCH MG: 3.12 TABLET, FILM COATED ORAL at 09:21

## 2023-05-11 RX ADMIN — Medication SCH EACH: at 06:27

## 2023-05-11 RX ADMIN — Medication PRN ML: at 05:25

## 2023-05-11 RX ADMIN — METRONIDAZOLE SCH MG: 500 TABLET ORAL at 05:25

## 2023-05-11 RX ADMIN — CLOTRIMAZOLE SCH APPLIC: 1 CREAM TOPICAL at 09:32

## 2023-05-11 RX ADMIN — Medication SCH EACH: at 00:00

## 2023-05-11 RX ADMIN — LOSARTAN POTASSIUM SCH MG: 25 TABLET, FILM COATED ORAL at 09:21

## 2023-05-11 RX ADMIN — VITAMIN A AND VITAMIN D SCH APPLIC: 929.3 OINTMENT TOPICAL at 09:32

## 2023-05-11 RX ADMIN — ACETAMINOPHEN SCH MG: 160 SOLUTION ORAL at 09:20

## 2023-05-11 RX ADMIN — TRAMADOL HYDROCHLORIDE SCH MG: 50 TABLET, FILM COATED ORAL at 09:21

## 2023-05-11 RX ADMIN — INSULIN HUMAN PRN UNITS: 100 INJECTION, SOLUTION PARENTERAL at 06:27

## 2023-05-11 RX ADMIN — DOCUSATE SODIUM SCH MG: 50 LIQUID ORAL at 09:20

## 2023-05-11 RX ADMIN — VANCOMYCIN HYDROCHLORIDE SCH MG: 125 CAPSULE ORAL at 05:25

## 2023-09-06 ENCOUNTER — HOSPITAL ENCOUNTER (INPATIENT)
Dept: HOSPITAL 54 - ER | Age: 78
LOS: 16 days | Discharge: NURSING, REHAB OR LONG-TERM CARE | DRG: 853 | End: 2023-09-22
Attending: LEGAL MEDICINE | Admitting: NURSE PRACTITIONER
Payer: MEDICARE

## 2023-09-06 VITALS — WEIGHT: 101 LBS | BODY MASS INDEX: 17.24 KG/M2 | HEIGHT: 64 IN

## 2023-09-06 DIAGNOSIS — K29.71: ICD-10-CM

## 2023-09-06 DIAGNOSIS — I69.351: ICD-10-CM

## 2023-09-06 DIAGNOSIS — N17.9: ICD-10-CM

## 2023-09-06 DIAGNOSIS — L89.894: ICD-10-CM

## 2023-09-06 DIAGNOSIS — Z93.1: ICD-10-CM

## 2023-09-06 DIAGNOSIS — F05: ICD-10-CM

## 2023-09-06 DIAGNOSIS — Z79.51: ICD-10-CM

## 2023-09-06 DIAGNOSIS — E88.09: ICD-10-CM

## 2023-09-06 DIAGNOSIS — F29: ICD-10-CM

## 2023-09-06 DIAGNOSIS — D75.839: ICD-10-CM

## 2023-09-06 DIAGNOSIS — A41.9: Primary | ICD-10-CM

## 2023-09-06 DIAGNOSIS — Z79.82: ICD-10-CM

## 2023-09-06 DIAGNOSIS — J44.0: ICD-10-CM

## 2023-09-06 DIAGNOSIS — I10: ICD-10-CM

## 2023-09-06 DIAGNOSIS — M62.422: ICD-10-CM

## 2023-09-06 DIAGNOSIS — M62.461: ICD-10-CM

## 2023-09-06 DIAGNOSIS — R13.10: ICD-10-CM

## 2023-09-06 DIAGNOSIS — L89.124: ICD-10-CM

## 2023-09-06 DIAGNOSIS — Z22.322: ICD-10-CM

## 2023-09-06 DIAGNOSIS — L97.519: ICD-10-CM

## 2023-09-06 DIAGNOSIS — F41.9: ICD-10-CM

## 2023-09-06 DIAGNOSIS — J96.01: ICD-10-CM

## 2023-09-06 DIAGNOSIS — Z86.19: ICD-10-CM

## 2023-09-06 DIAGNOSIS — Z87.39: ICD-10-CM

## 2023-09-06 DIAGNOSIS — F17.200: ICD-10-CM

## 2023-09-06 DIAGNOSIS — E78.5: ICD-10-CM

## 2023-09-06 DIAGNOSIS — I21.A1: ICD-10-CM

## 2023-09-06 DIAGNOSIS — K21.9: ICD-10-CM

## 2023-09-06 DIAGNOSIS — E11.621: ICD-10-CM

## 2023-09-06 DIAGNOSIS — F03.93: ICD-10-CM

## 2023-09-06 DIAGNOSIS — G89.29: ICD-10-CM

## 2023-09-06 DIAGNOSIS — Z74.01: ICD-10-CM

## 2023-09-06 DIAGNOSIS — E87.6: ICD-10-CM

## 2023-09-06 DIAGNOSIS — D68.59: ICD-10-CM

## 2023-09-06 DIAGNOSIS — B37.49: ICD-10-CM

## 2023-09-06 DIAGNOSIS — E11.52: ICD-10-CM

## 2023-09-06 DIAGNOSIS — H54.8: ICD-10-CM

## 2023-09-06 DIAGNOSIS — Z79.899: ICD-10-CM

## 2023-09-06 DIAGNOSIS — F03.94: ICD-10-CM

## 2023-09-06 DIAGNOSIS — M62.59: ICD-10-CM

## 2023-09-06 DIAGNOSIS — F31.9: ICD-10-CM

## 2023-09-06 DIAGNOSIS — J69.0: ICD-10-CM

## 2023-09-06 DIAGNOSIS — Z79.4: ICD-10-CM

## 2023-09-06 DIAGNOSIS — R53.2: ICD-10-CM

## 2023-09-06 DIAGNOSIS — G92.8: ICD-10-CM

## 2023-09-06 DIAGNOSIS — M62.462: ICD-10-CM

## 2023-09-06 DIAGNOSIS — M62.421: ICD-10-CM

## 2023-09-06 DIAGNOSIS — E44.0: ICD-10-CM

## 2023-09-06 DIAGNOSIS — I71.43: ICD-10-CM

## 2023-09-06 LAB
ALBUMIN SERPL BCP-MCNC: 2.9 G/DL (ref 3.4–5)
ALP SERPL-CCNC: 129 U/L (ref 46–116)
ALT SERPL W P-5'-P-CCNC: 33 U/L (ref 12–78)
APPEARANCE UR: (no result)
APTT PPP: 30.7 SEC (ref 24.3–34.3)
AST SERPL W P-5'-P-CCNC: 32 U/L (ref 15–37)
BACTERIA UR CULT: YES
BASOPHILS # BLD AUTO: 0.1 K/UL (ref 0–0.2)
BASOPHILS NFR BLD AUTO: 0.4 % (ref 0–2)
BILIRUB DIRECT SERPL-MCNC: 0.1 MG/DL (ref 0–0.2)
BILIRUB SERPL-MCNC: 0.3 MG/DL (ref 0.2–1)
BILIRUB UR QL STRIP: NEGATIVE
BUN SERPL-MCNC: 41 MG/DL (ref 7–18)
CALCIUM SERPL-MCNC: 10.1 MG/DL (ref 8.5–10.1)
CHLORIDE SERPL-SCNC: 107 MMOL/L (ref 98–107)
CO2 SERPL-SCNC: 21 MMOL/L (ref 21–32)
COLOR UR: YELLOW
CREAT SERPL-MCNC: 0.9 MG/DL (ref 0.6–1.3)
EOSINOPHIL # BLD AUTO: 0 K/UL (ref 0–0.7)
EOSINOPHIL NFR BLD AUTO: 0.1 % (ref 0–6)
ERYTHROCYTE [DISTWIDTH] IN BLOOD BY AUTOMATED COUNT: 20.5 % (ref 11.5–15)
GLUCOSE SERPL-MCNC: 147 MG/DL (ref 74–106)
GLUCOSE UR STRIP-MCNC: NEGATIVE MG/DL
HCT VFR BLD AUTO: 41 % (ref 33–45)
HGB BLD-MCNC: 13.1 G/DL (ref 11.5–14.8)
HGB UR QL STRIP: (no result) ERY/UL
INR PPP: 1.05 (ref 0.91–1.1)
KETONES UR STRIP-MCNC: NEGATIVE MG/DL
LACTATE SERPL-SCNC: 1.9 MMOL/L (ref 0.4–2)
LEUKOCYTE ESTERASE UR QL STRIP: (no result)
LYMPHOCYTES NFR BLD AUTO: 1.5 K/UL (ref 0.8–4.8)
LYMPHOCYTES NFR BLD AUTO: 7.3 % (ref 20–44)
LYMPHOCYTES NFR BLD MANUAL: 9 % (ref 16–48)
MCH RBC QN AUTO: 26 PG (ref 26–33)
MCHC RBC AUTO-ENTMCNC: 32 G/DL (ref 31–36)
MCV RBC AUTO: 80 FL (ref 82–100)
MONOCYTES NFR BLD AUTO: 1.2 K/UL (ref 0.1–1.3)
MONOCYTES NFR BLD AUTO: 5.8 % (ref 2–12)
MONOCYTES NFR BLD MANUAL: 4 % (ref 0–11)
NEUTROPHILS # BLD AUTO: 18.4 K/UL (ref 1.8–8.9)
NEUTROPHILS NFR BLD AUTO: 86.4 % (ref 43–81)
NEUTS SEG NFR BLD MANUAL: 87 % (ref 42–76)
NITRITE UR QL STRIP: NEGATIVE
PH UR STRIP: 6.5 [PH] (ref 5–8)
PLATELET # BLD AUTO: 499 K/UL (ref 150–450)
PLATELET BLD QL SMEAR: (no result)
POTASSIUM SERPL-SCNC: 4.3 MMOL/L (ref 3.5–5.1)
PROT SERPL-MCNC: 8.6 G/DL (ref 6.4–8.2)
PROT UR QL STRIP: (no result) MG/DL
PROTHROMBIN TIME: 11 SECS (ref 9.2–11.1)
RBC # BLD AUTO: 5.11 MIL/UL (ref 4–5.2)
RBC #/AREA URNS HPF: (no result) /HPF (ref 0–2)
SODIUM SERPL-SCNC: 142 MMOL/L (ref 136–145)
SP GR UR STRIP: 1.01 (ref 1–1.03)
UROBILINOGEN UR STRIP-MCNC: 0.2 EU/DL
WBC NRBC COR # BLD AUTO: 21.3 K/UL (ref 4.3–11)

## 2023-09-06 PROCEDURE — C9803 HOPD COVID-19 SPEC COLLECT: HCPCS

## 2023-09-06 PROCEDURE — A6403 STERILE GAUZE>16 <= 48 SQ IN: HCPCS

## 2023-09-06 PROCEDURE — G0378 HOSPITAL OBSERVATION PER HR: HCPCS

## 2023-09-06 PROCEDURE — A4223 INFUSION SUPPLIES W/O PUMP: HCPCS

## 2023-09-06 PROCEDURE — A4216 STERILE WATER/SALINE, 10 ML: HCPCS

## 2023-09-06 PROCEDURE — C9113 INJ PANTOPRAZOLE SODIUM, VIA: HCPCS

## 2023-09-07 VITALS — TEMPERATURE: 99 F | SYSTOLIC BLOOD PRESSURE: 118 MMHG | DIASTOLIC BLOOD PRESSURE: 98 MMHG | OXYGEN SATURATION: 98 %

## 2023-09-07 VITALS — DIASTOLIC BLOOD PRESSURE: 100 MMHG | OXYGEN SATURATION: 91 % | TEMPERATURE: 98.4 F | SYSTOLIC BLOOD PRESSURE: 118 MMHG

## 2023-09-07 VITALS — TEMPERATURE: 98 F | SYSTOLIC BLOOD PRESSURE: 153 MMHG | DIASTOLIC BLOOD PRESSURE: 76 MMHG | OXYGEN SATURATION: 95 %

## 2023-09-07 VITALS — OXYGEN SATURATION: 94 % | SYSTOLIC BLOOD PRESSURE: 146 MMHG | TEMPERATURE: 99.1 F | DIASTOLIC BLOOD PRESSURE: 90 MMHG

## 2023-09-07 LAB
BASOPHILS # BLD AUTO: 0 K/UL (ref 0–0.2)
BASOPHILS NFR BLD AUTO: 0.1 % (ref 0–2)
BUN SERPL-MCNC: 36 MG/DL (ref 7–18)
CALCIUM SERPL-MCNC: 9.1 MG/DL (ref 8.5–10.1)
CHLORIDE SERPL-SCNC: 110 MMOL/L (ref 98–107)
CO2 SERPL-SCNC: 20 MMOL/L (ref 21–32)
CREAT SERPL-MCNC: 0.8 MG/DL (ref 0.6–1.3)
EOSINOPHIL # BLD AUTO: 0 K/UL (ref 0–0.7)
EOSINOPHIL NFR BLD AUTO: 0.2 % (ref 0–6)
ERYTHROCYTE [DISTWIDTH] IN BLOOD BY AUTOMATED COUNT: 19.8 % (ref 11.5–15)
GLUCOSE SERPL-MCNC: 171 MG/DL (ref 74–106)
HCT VFR BLD AUTO: 35 % (ref 33–45)
HCT VFR BLD AUTO: 37 % (ref 33–45)
HGB BLD-MCNC: 11.1 G/DL (ref 11.5–14.8)
HGB BLD-MCNC: 11.7 G/DL (ref 11.5–14.8)
LYMPHOCYTES NFR BLD AUTO: 0.8 K/UL (ref 0.8–4.8)
LYMPHOCYTES NFR BLD AUTO: 3.9 % (ref 20–44)
MAGNESIUM SERPL-MCNC: 2.1 MG/DL (ref 1.8–2.4)
MCH RBC QN AUTO: 26 PG (ref 26–33)
MCHC RBC AUTO-ENTMCNC: 32 G/DL (ref 31–36)
MCV RBC AUTO: 80 FL (ref 82–100)
MONOCYTES NFR BLD AUTO: 1.2 K/UL (ref 0.1–1.3)
MONOCYTES NFR BLD AUTO: 5.7 % (ref 2–12)
NEUTROPHILS # BLD AUTO: 18.9 K/UL (ref 1.8–8.9)
NEUTROPHILS NFR BLD AUTO: 90.1 % (ref 43–81)
PHOSPHATE SERPL-MCNC: 2.8 MG/DL (ref 2.5–4.9)
PLATELET # BLD AUTO: 405 K/UL (ref 150–450)
POTASSIUM SERPL-SCNC: 3.5 MMOL/L (ref 3.5–5.1)
RBC # BLD AUTO: 4.59 MIL/UL (ref 4–5.2)
SODIUM SERPL-SCNC: 142 MMOL/L (ref 136–145)
TSH SERPL DL<=0.005 MIU/L-ACNC: 0.71 UIU/ML (ref 0.36–3.74)
WBC NRBC COR # BLD AUTO: 21 K/UL (ref 4.3–11)

## 2023-09-07 RX ADMIN — SODIUM CHLORIDE SCH MG: 9 INJECTION, SOLUTION INTRAVENOUS at 21:55

## 2023-09-07 RX ADMIN — Medication SCH EACH: at 17:00

## 2023-09-07 RX ADMIN — QUETIAPINE SCH MG: 25 TABLET, FILM COATED ORAL at 22:17

## 2023-09-07 RX ADMIN — PIPERACILLIN SODIUM AND TAZOBACTAM SODIUM SCH MLS/HR: .375; 3 INJECTION, POWDER, LYOPHILIZED, FOR SOLUTION INTRAVENOUS at 09:48

## 2023-09-07 RX ADMIN — DEXTROSE MONOHYDRATE SCH MLS/HR: 50 INJECTION, SOLUTION INTRAVENOUS at 21:56

## 2023-09-07 RX ADMIN — Medication SCH APPLIC: at 11:46

## 2023-09-07 RX ADMIN — DEXTROSE MONOHYDRATE SCH MLS/HR: 50 INJECTION, SOLUTION INTRAVENOUS at 08:45

## 2023-09-07 RX ADMIN — PIPERACILLIN SODIUM AND TAZOBACTAM SODIUM SCH MLS/HR: .375; 3 INJECTION, POWDER, LYOPHILIZED, FOR SOLUTION INTRAVENOUS at 17:48

## 2023-09-07 RX ADMIN — Medication PRN ML: at 17:47

## 2023-09-07 RX ADMIN — SODIUM CHLORIDE SCH MG: 9 INJECTION, SOLUTION INTRAVENOUS at 08:45

## 2023-09-08 VITALS — OXYGEN SATURATION: 97 % | DIASTOLIC BLOOD PRESSURE: 84 MMHG | SYSTOLIC BLOOD PRESSURE: 157 MMHG | TEMPERATURE: 98.1 F

## 2023-09-08 VITALS — OXYGEN SATURATION: 99 % | SYSTOLIC BLOOD PRESSURE: 133 MMHG | DIASTOLIC BLOOD PRESSURE: 75 MMHG | TEMPERATURE: 98.2 F

## 2023-09-08 VITALS — OXYGEN SATURATION: 98 % | SYSTOLIC BLOOD PRESSURE: 136 MMHG | TEMPERATURE: 97.8 F | DIASTOLIC BLOOD PRESSURE: 68 MMHG

## 2023-09-08 VITALS — OXYGEN SATURATION: 99 % | DIASTOLIC BLOOD PRESSURE: 71 MMHG | TEMPERATURE: 97.9 F | SYSTOLIC BLOOD PRESSURE: 134 MMHG

## 2023-09-08 VITALS — TEMPERATURE: 98 F | DIASTOLIC BLOOD PRESSURE: 55 MMHG | OXYGEN SATURATION: 97 % | SYSTOLIC BLOOD PRESSURE: 132 MMHG

## 2023-09-08 VITALS — OXYGEN SATURATION: 96 % | TEMPERATURE: 97.9 F | DIASTOLIC BLOOD PRESSURE: 73 MMHG | SYSTOLIC BLOOD PRESSURE: 137 MMHG

## 2023-09-08 VITALS — DIASTOLIC BLOOD PRESSURE: 65 MMHG | TEMPERATURE: 97.8 F | SYSTOLIC BLOOD PRESSURE: 150 MMHG | OXYGEN SATURATION: 98 %

## 2023-09-08 VITALS — DIASTOLIC BLOOD PRESSURE: 84 MMHG | TEMPERATURE: 98.1 F | OXYGEN SATURATION: 97 % | SYSTOLIC BLOOD PRESSURE: 157 MMHG

## 2023-09-08 VITALS — TEMPERATURE: 97.9 F | SYSTOLIC BLOOD PRESSURE: 126 MMHG | OXYGEN SATURATION: 97 % | DIASTOLIC BLOOD PRESSURE: 84 MMHG

## 2023-09-08 VITALS — SYSTOLIC BLOOD PRESSURE: 147 MMHG | OXYGEN SATURATION: 98 % | DIASTOLIC BLOOD PRESSURE: 71 MMHG | TEMPERATURE: 97.6 F

## 2023-09-08 VITALS — OXYGEN SATURATION: 99 % | TEMPERATURE: 98.1 F | SYSTOLIC BLOOD PRESSURE: 120 MMHG | DIASTOLIC BLOOD PRESSURE: 65 MMHG

## 2023-09-08 LAB
BASOPHILS # BLD AUTO: 0.1 K/UL (ref 0–0.2)
BASOPHILS NFR BLD AUTO: 0.5 % (ref 0–2)
BUN SERPL-MCNC: 22 MG/DL (ref 7–18)
CALCIUM SERPL-MCNC: 9.3 MG/DL (ref 8.5–10.1)
CHLORIDE SERPL-SCNC: 112 MMOL/L (ref 98–107)
CO2 SERPL-SCNC: 21 MMOL/L (ref 21–32)
CREAT SERPL-MCNC: 0.8 MG/DL (ref 0.6–1.3)
EOSINOPHIL # BLD AUTO: 0.8 K/UL (ref 0–0.7)
EOSINOPHIL NFR BLD AUTO: 4.3 % (ref 0–6)
ERYTHROCYTE [DISTWIDTH] IN BLOOD BY AUTOMATED COUNT: 20.2 % (ref 11.5–15)
GLUCOSE SERPL-MCNC: 151 MG/DL (ref 74–106)
HCT VFR BLD AUTO: 34 % (ref 33–45)
HEMOCCULT STL QL: POSITIVE
HGB BLD-MCNC: 10.7 G/DL (ref 11.5–14.8)
LYMPHOCYTES NFR BLD AUTO: 0.9 K/UL (ref 0.8–4.8)
LYMPHOCYTES NFR BLD AUTO: 4.6 % (ref 20–44)
MCH RBC QN AUTO: 26 PG (ref 26–33)
MCHC RBC AUTO-ENTMCNC: 32 G/DL (ref 31–36)
MCV RBC AUTO: 81 FL (ref 82–100)
MONOCYTES NFR BLD AUTO: 1.2 K/UL (ref 0.1–1.3)
MONOCYTES NFR BLD AUTO: 6.2 % (ref 2–12)
NEUTROPHILS # BLD AUTO: 15.7 K/UL (ref 1.8–8.9)
NEUTROPHILS NFR BLD AUTO: 84.4 % (ref 43–81)
PLATELET # BLD AUTO: 363 K/UL (ref 150–450)
POTASSIUM SERPL-SCNC: 3.3 MMOL/L (ref 3.5–5.1)
RBC # BLD AUTO: 4.16 MIL/UL (ref 4–5.2)
SODIUM SERPL-SCNC: 145 MMOL/L (ref 136–145)
WBC NRBC COR # BLD AUTO: 18.7 K/UL (ref 4.3–11)

## 2023-09-08 PROCEDURE — 0DB68ZX EXCISION OF STOMACH, VIA NATURAL OR ARTIFICIAL OPENING ENDOSCOPIC, DIAGNOSTIC: ICD-10-PCS | Performed by: INTERNAL MEDICINE

## 2023-09-08 RX ADMIN — METOPROLOL TARTRATE SCH MG: 25 TABLET, FILM COATED ORAL at 08:56

## 2023-09-08 RX ADMIN — QUETIAPINE SCH MG: 25 TABLET, FILM COATED ORAL at 21:55

## 2023-09-08 RX ADMIN — DEXTROSE MONOHYDRATE SCH MLS/HR: 50 INJECTION, SOLUTION INTRAVENOUS at 21:28

## 2023-09-08 RX ADMIN — SODIUM CHLORIDE SCH MG: 9 INJECTION, SOLUTION INTRAVENOUS at 08:45

## 2023-09-08 RX ADMIN — THERA TABS SCH UDTAB: TAB at 09:00

## 2023-09-08 RX ADMIN — CHLORHEXIDINE GLUCONATE 0.12% ORAL RINSE SCH ML: 1.2 LIQUID ORAL at 08:55

## 2023-09-08 RX ADMIN — PIPERACILLIN SODIUM AND TAZOBACTAM SODIUM SCH MLS/HR: .375; 3 INJECTION, POWDER, LYOPHILIZED, FOR SOLUTION INTRAVENOUS at 10:37

## 2023-09-08 RX ADMIN — TRAMADOL HYDROCHLORIDE SCH MG: 50 TABLET, FILM COATED ORAL at 18:09

## 2023-09-08 RX ADMIN — PIPERACILLIN SODIUM AND TAZOBACTAM SODIUM SCH MLS/HR: .375; 3 INJECTION, POWDER, LYOPHILIZED, FOR SOLUTION INTRAVENOUS at 01:11

## 2023-09-08 RX ADMIN — MUPIROCIN SCH GM: 20 OINTMENT TOPICAL at 21:29

## 2023-09-08 RX ADMIN — CALCIUM SCH MG: 500 TABLET ORAL at 18:08

## 2023-09-08 RX ADMIN — ASPIRIN 81 MG SCH MG: 81 TABLET ORAL at 18:00

## 2023-09-08 RX ADMIN — CHLORHEXIDINE GLUCONATE 0.12% ORAL RINSE SCH ML: 1.2 LIQUID ORAL at 18:08

## 2023-09-08 RX ADMIN — AMLODIPINE BESYLATE SCH MG: 5 TABLET ORAL at 08:55

## 2023-09-08 RX ADMIN — QUETIAPINE SCH MG: 25 TABLET, FILM COATED ORAL at 08:45

## 2023-09-08 RX ADMIN — TRAMADOL HYDROCHLORIDE SCH MG: 50 TABLET, FILM COATED ORAL at 09:00

## 2023-09-08 RX ADMIN — OXYCODONE HYDROCHLORIDE AND ACETAMINOPHEN SCH MG: 500 TABLET ORAL at 08:45

## 2023-09-08 RX ADMIN — DOCUSATE SODIUM SCH MG: 50 LIQUID ORAL at 08:55

## 2023-09-08 RX ADMIN — PIPERACILLIN SODIUM AND TAZOBACTAM SODIUM SCH MLS/HR: .375; 3 INJECTION, POWDER, LYOPHILIZED, FOR SOLUTION INTRAVENOUS at 18:25

## 2023-09-08 RX ADMIN — MEROPENEM SCH MLS/HR: 500 INJECTION INTRAVENOUS at 21:42

## 2023-09-08 RX ADMIN — SODIUM CHLORIDE SCH MG: 9 INJECTION, SOLUTION INTRAVENOUS at 21:29

## 2023-09-08 RX ADMIN — QUETIAPINE SCH MG: 25 TABLET, FILM COATED ORAL at 18:08

## 2023-09-08 RX ADMIN — Medication SCH EACH: at 08:46

## 2023-09-08 RX ADMIN — LOSARTAN POTASSIUM SCH MG: 50 TABLET, FILM COATED ORAL at 18:09

## 2023-09-08 RX ADMIN — DEXTROSE MONOHYDRATE SCH MLS/HR: 50 INJECTION, SOLUTION INTRAVENOUS at 09:31

## 2023-09-08 RX ADMIN — METOPROLOL TARTRATE SCH MG: 25 TABLET, FILM COATED ORAL at 20:29

## 2023-09-08 RX ADMIN — Medication SCH MG: at 08:45

## 2023-09-08 RX ADMIN — LOSARTAN POTASSIUM SCH MG: 50 TABLET, FILM COATED ORAL at 08:56

## 2023-09-08 RX ADMIN — Medication SCH APPLIC: at 09:31

## 2023-09-08 RX ADMIN — Medication SCH EACH: at 18:08

## 2023-09-09 VITALS — SYSTOLIC BLOOD PRESSURE: 146 MMHG | TEMPERATURE: 99.1 F | OXYGEN SATURATION: 96 % | DIASTOLIC BLOOD PRESSURE: 71 MMHG

## 2023-09-09 VITALS — SYSTOLIC BLOOD PRESSURE: 131 MMHG | TEMPERATURE: 98 F | OXYGEN SATURATION: 97 % | DIASTOLIC BLOOD PRESSURE: 80 MMHG

## 2023-09-09 VITALS — SYSTOLIC BLOOD PRESSURE: 124 MMHG | TEMPERATURE: 97.4 F | OXYGEN SATURATION: 99 % | DIASTOLIC BLOOD PRESSURE: 69 MMHG

## 2023-09-09 VITALS — OXYGEN SATURATION: 99 % | TEMPERATURE: 98.6 F | DIASTOLIC BLOOD PRESSURE: 80 MMHG | SYSTOLIC BLOOD PRESSURE: 121 MMHG

## 2023-09-09 VITALS — DIASTOLIC BLOOD PRESSURE: 60 MMHG | OXYGEN SATURATION: 97 % | SYSTOLIC BLOOD PRESSURE: 119 MMHG | TEMPERATURE: 97.6 F

## 2023-09-09 LAB
BASOPHILS # BLD AUTO: 0.1 K/UL (ref 0–0.2)
BASOPHILS NFR BLD AUTO: 0.5 % (ref 0–2)
BUN SERPL-MCNC: 32 MG/DL (ref 7–18)
CALCIUM SERPL-MCNC: 9.4 MG/DL (ref 8.5–10.1)
CHLORIDE SERPL-SCNC: 117 MMOL/L (ref 98–107)
CO2 SERPL-SCNC: 22 MMOL/L (ref 21–32)
CREAT SERPL-MCNC: 0.7 MG/DL (ref 0.6–1.3)
EOSINOPHIL # BLD AUTO: 1.1 K/UL (ref 0–0.7)
EOSINOPHIL NFR BLD AUTO: 7.5 % (ref 0–6)
ERYTHROCYTE [DISTWIDTH] IN BLOOD BY AUTOMATED COUNT: 20.3 % (ref 11.5–15)
GLUCOSE SERPL-MCNC: 123 MG/DL (ref 74–106)
HCT VFR BLD AUTO: 33 % (ref 33–45)
HGB BLD-MCNC: 10.4 G/DL (ref 11.5–14.8)
LYMPHOCYTES NFR BLD AUTO: 0.7 K/UL (ref 0.8–4.8)
LYMPHOCYTES NFR BLD AUTO: 5 % (ref 20–44)
MAGNESIUM SERPL-MCNC: 1.9 MG/DL (ref 1.8–2.4)
MCH RBC QN AUTO: 25 PG (ref 26–33)
MCHC RBC AUTO-ENTMCNC: 31 G/DL (ref 31–36)
MCV RBC AUTO: 81 FL (ref 82–100)
MONOCYTES NFR BLD AUTO: 0.9 K/UL (ref 0.1–1.3)
MONOCYTES NFR BLD AUTO: 6.2 % (ref 2–12)
NEUTROPHILS # BLD AUTO: 11.8 K/UL (ref 1.8–8.9)
NEUTROPHILS NFR BLD AUTO: 80.8 % (ref 43–81)
PLATELET # BLD AUTO: 351 K/UL (ref 150–450)
POTASSIUM SERPL-SCNC: 3.5 MMOL/L (ref 3.5–5.1)
RBC # BLD AUTO: 4.08 MIL/UL (ref 4–5.2)
SODIUM SERPL-SCNC: 148 MMOL/L (ref 136–145)
WBC NRBC COR # BLD AUTO: 14.6 K/UL (ref 4.3–11)

## 2023-09-09 RX ADMIN — LOSARTAN POTASSIUM SCH MG: 50 TABLET, FILM COATED ORAL at 16:23

## 2023-09-09 RX ADMIN — DEXTROSE AND SODIUM CHLORIDE PRN MLS/HR: 5; 450 INJECTION, SOLUTION INTRAVENOUS at 05:19

## 2023-09-09 RX ADMIN — CHLORHEXIDINE GLUCONATE 0.12% ORAL RINSE SCH ML: 1.2 LIQUID ORAL at 16:22

## 2023-09-09 RX ADMIN — LOSARTAN POTASSIUM SCH MG: 50 TABLET, FILM COATED ORAL at 08:58

## 2023-09-09 RX ADMIN — Medication SCH MG: at 08:56

## 2023-09-09 RX ADMIN — MEROPENEM SCH MLS/HR: 500 INJECTION INTRAVENOUS at 04:14

## 2023-09-09 RX ADMIN — MEROPENEM SCH MLS/HR: 500 INJECTION INTRAVENOUS at 21:14

## 2023-09-09 RX ADMIN — MUPIROCIN SCH APPLIC: 20 OINTMENT TOPICAL at 21:14

## 2023-09-09 RX ADMIN — TRAMADOL HYDROCHLORIDE SCH MG: 50 TABLET, FILM COATED ORAL at 16:23

## 2023-09-09 RX ADMIN — DEXTROSE MONOHYDRATE SCH MLS/HR: 50 INJECTION, SOLUTION INTRAVENOUS at 21:41

## 2023-09-09 RX ADMIN — QUETIAPINE SCH MG: 25 TABLET, FILM COATED ORAL at 08:56

## 2023-09-09 RX ADMIN — CHLORHEXIDINE GLUCONATE 0.12% ORAL RINSE SCH ML: 1.2 LIQUID ORAL at 08:56

## 2023-09-09 RX ADMIN — MUPIROCIN SCH APPLIC: 20 OINTMENT TOPICAL at 09:21

## 2023-09-09 RX ADMIN — METOPROLOL TARTRATE SCH MG: 25 TABLET, FILM COATED ORAL at 21:13

## 2023-09-09 RX ADMIN — SODIUM CHLORIDE SCH MG: 9 INJECTION, SOLUTION INTRAVENOUS at 08:56

## 2023-09-09 RX ADMIN — MEROPENEM SCH MLS/HR: 500 INJECTION INTRAVENOUS at 12:17

## 2023-09-09 RX ADMIN — METOPROLOL TARTRATE SCH MG: 25 TABLET, FILM COATED ORAL at 08:57

## 2023-09-09 RX ADMIN — CALCIUM SCH MG: 500 TABLET ORAL at 17:01

## 2023-09-09 RX ADMIN — QUETIAPINE SCH MG: 25 TABLET, FILM COATED ORAL at 16:23

## 2023-09-09 RX ADMIN — QUETIAPINE SCH MG: 25 TABLET, FILM COATED ORAL at 21:14

## 2023-09-09 RX ADMIN — AMLODIPINE BESYLATE SCH MG: 5 TABLET ORAL at 08:58

## 2023-09-09 RX ADMIN — DOCUSATE SODIUM SCH MG: 50 LIQUID ORAL at 08:56

## 2023-09-09 RX ADMIN — TRAMADOL HYDROCHLORIDE SCH MG: 50 TABLET, FILM COATED ORAL at 08:56

## 2023-09-09 RX ADMIN — ASPIRIN 81 MG SCH MG: 81 TABLET ORAL at 17:02

## 2023-09-09 RX ADMIN — Medication SCH EACH: at 08:58

## 2023-09-09 RX ADMIN — Medication PRN ML: at 09:33

## 2023-09-09 RX ADMIN — ENOXAPARIN SODIUM SCH MG: 40 INJECTION SUBCUTANEOUS at 10:38

## 2023-09-09 RX ADMIN — OXYCODONE HYDROCHLORIDE AND ACETAMINOPHEN SCH MG: 500 TABLET ORAL at 08:57

## 2023-09-09 RX ADMIN — THERA TABS SCH UDTAB: TAB at 08:56

## 2023-09-09 RX ADMIN — Medication SCH EACH: at 16:22

## 2023-09-09 RX ADMIN — SODIUM CHLORIDE SCH MG: 9 INJECTION, SOLUTION INTRAVENOUS at 21:14

## 2023-09-09 RX ADMIN — Medication SCH APPLIC: at 09:22

## 2023-09-09 RX ADMIN — DEXTROSE MONOHYDRATE SCH MLS/HR: 50 INJECTION, SOLUTION INTRAVENOUS at 09:23

## 2023-09-10 VITALS — SYSTOLIC BLOOD PRESSURE: 111 MMHG | DIASTOLIC BLOOD PRESSURE: 55 MMHG | TEMPERATURE: 97.9 F | OXYGEN SATURATION: 98 %

## 2023-09-10 LAB
BASOPHILS # BLD AUTO: 0.1 K/UL (ref 0–0.2)
BASOPHILS NFR BLD AUTO: 0.3 % (ref 0–2)
BUN SERPL-MCNC: 28 MG/DL (ref 7–18)
CALCIUM SERPL-MCNC: 9.3 MG/DL (ref 8.5–10.1)
CHLORIDE SERPL-SCNC: 112 MMOL/L (ref 98–107)
CO2 SERPL-SCNC: 23 MMOL/L (ref 21–32)
CREAT SERPL-MCNC: 0.5 MG/DL (ref 0.6–1.3)
EOSINOPHIL # BLD AUTO: 0.3 K/UL (ref 0–0.7)
EOSINOPHIL NFR BLD AUTO: 1.8 % (ref 0–6)
ERYTHROCYTE [DISTWIDTH] IN BLOOD BY AUTOMATED COUNT: 20.1 % (ref 11.5–15)
GLUCOSE SERPL-MCNC: 154 MG/DL (ref 74–106)
HCT VFR BLD AUTO: 31 % (ref 33–45)
HGB BLD-MCNC: 10 G/DL (ref 11.5–14.8)
LYMPHOCYTES NFR BLD AUTO: 1.4 K/UL (ref 0.8–4.8)
LYMPHOCYTES NFR BLD AUTO: 8 % (ref 20–44)
MCH RBC QN AUTO: 26 PG (ref 26–33)
MCHC RBC AUTO-ENTMCNC: 32 G/DL (ref 31–36)
MCV RBC AUTO: 81 FL (ref 82–100)
MONOCYTES NFR BLD AUTO: 1.1 K/UL (ref 0.1–1.3)
MONOCYTES NFR BLD AUTO: 6.5 % (ref 2–12)
NEUTROPHILS # BLD AUTO: 14.6 K/UL (ref 1.8–8.9)
NEUTROPHILS NFR BLD AUTO: 83.4 % (ref 43–81)
PLATELET # BLD AUTO: 350 K/UL (ref 150–450)
POTASSIUM SERPL-SCNC: 3 MMOL/L (ref 3.5–5.1)
RBC # BLD AUTO: 3.86 MIL/UL (ref 4–5.2)
SODIUM SERPL-SCNC: 145 MMOL/L (ref 136–145)
WBC NRBC COR # BLD AUTO: 17.5 K/UL (ref 4.3–11)

## 2023-09-10 RX ADMIN — SODIUM CHLORIDE SCH MG: 9 INJECTION, SOLUTION INTRAVENOUS at 21:29

## 2023-09-10 RX ADMIN — LOSARTAN POTASSIUM SCH MG: 50 TABLET, FILM COATED ORAL at 08:56

## 2023-09-10 RX ADMIN — DEXTROSE MONOHYDRATE SCH MLS/HR: 50 INJECTION, SOLUTION INTRAVENOUS at 09:13

## 2023-09-10 RX ADMIN — METOPROLOL TARTRATE SCH MG: 25 TABLET, FILM COATED ORAL at 08:56

## 2023-09-10 RX ADMIN — CHLORHEXIDINE GLUCONATE 0.12% ORAL RINSE SCH ML: 1.2 LIQUID ORAL at 08:54

## 2023-09-10 RX ADMIN — Medication SCH MG: at 08:54

## 2023-09-10 RX ADMIN — OXYCODONE HYDROCHLORIDE AND ACETAMINOPHEN SCH MG: 500 TABLET ORAL at 08:54

## 2023-09-10 RX ADMIN — QUETIAPINE SCH MG: 25 TABLET, FILM COATED ORAL at 08:55

## 2023-09-10 RX ADMIN — TRAMADOL HYDROCHLORIDE SCH MG: 50 TABLET, FILM COATED ORAL at 17:14

## 2023-09-10 RX ADMIN — MEROPENEM SCH MLS/HR: 500 INJECTION INTRAVENOUS at 12:13

## 2023-09-10 RX ADMIN — Medication SCH MLS/HR: at 17:13

## 2023-09-10 RX ADMIN — MUPIROCIN SCH APPLIC: 20 OINTMENT TOPICAL at 09:40

## 2023-09-10 RX ADMIN — Medication SCH APPLIC: at 09:40

## 2023-09-10 RX ADMIN — CALCIUM SCH MG: 500 TABLET ORAL at 17:17

## 2023-09-10 RX ADMIN — MUPIROCIN SCH APPLIC: 20 OINTMENT TOPICAL at 21:30

## 2023-09-10 RX ADMIN — THERA TABS SCH UDTAB: TAB at 08:54

## 2023-09-10 RX ADMIN — DOCUSATE SODIUM SCH MG: 50 LIQUID ORAL at 08:54

## 2023-09-10 RX ADMIN — POTASSIUM CHLORIDE SCH MEQ: 1.5 POWDER, FOR SOLUTION ORAL at 20:50

## 2023-09-10 RX ADMIN — SODIUM CHLORIDE SCH MG: 9 INJECTION, SOLUTION INTRAVENOUS at 08:54

## 2023-09-10 RX ADMIN — CHLORHEXIDINE GLUCONATE 0.12% ORAL RINSE SCH ML: 1.2 LIQUID ORAL at 17:14

## 2023-09-10 RX ADMIN — ENOXAPARIN SODIUM SCH MG: 40 INJECTION SUBCUTANEOUS at 12:14

## 2023-09-10 RX ADMIN — TRAMADOL HYDROCHLORIDE SCH MG: 50 TABLET, FILM COATED ORAL at 08:54

## 2023-09-10 RX ADMIN — QUETIAPINE SCH MG: 25 TABLET, FILM COATED ORAL at 22:26

## 2023-09-10 RX ADMIN — LOSARTAN POTASSIUM SCH MG: 50 TABLET, FILM COATED ORAL at 17:18

## 2023-09-10 RX ADMIN — POTASSIUM CHLORIDE SCH MEQ: 1.5 POWDER, FOR SOLUTION ORAL at 22:26

## 2023-09-10 RX ADMIN — MEROPENEM SCH MLS/HR: 500 INJECTION INTRAVENOUS at 04:35

## 2023-09-10 RX ADMIN — ASPIRIN 81 MG SCH MG: 81 TABLET ORAL at 17:17

## 2023-09-10 RX ADMIN — DEXTROSE MONOHYDRATE SCH MLS/HR: 50 INJECTION, SOLUTION INTRAVENOUS at 21:55

## 2023-09-10 RX ADMIN — DEXTROSE AND SODIUM CHLORIDE PRN MLS/HR: 5; 450 INJECTION, SOLUTION INTRAVENOUS at 21:17

## 2023-09-10 RX ADMIN — Medication SCH EACH: at 17:18

## 2023-09-10 RX ADMIN — Medication PRN ML: at 02:55

## 2023-09-10 RX ADMIN — Medication SCH EACH: at 09:13

## 2023-09-10 RX ADMIN — AMLODIPINE BESYLATE SCH MG: 5 TABLET ORAL at 08:55

## 2023-09-10 RX ADMIN — QUETIAPINE SCH MG: 25 TABLET, FILM COATED ORAL at 17:14

## 2023-09-10 RX ADMIN — MEROPENEM SCH MLS/HR: 500 INJECTION INTRAVENOUS at 21:17

## 2023-09-10 RX ADMIN — METOPROLOL TARTRATE SCH MG: 25 TABLET, FILM COATED ORAL at 20:50

## 2023-09-10 RX ADMIN — Medication PRN ML: at 23:42

## 2023-09-11 VITALS — SYSTOLIC BLOOD PRESSURE: 152 MMHG | TEMPERATURE: 98.4 F | OXYGEN SATURATION: 97 % | DIASTOLIC BLOOD PRESSURE: 77 MMHG

## 2023-09-11 VITALS — TEMPERATURE: 98.6 F | OXYGEN SATURATION: 98 % | SYSTOLIC BLOOD PRESSURE: 121 MMHG | DIASTOLIC BLOOD PRESSURE: 65 MMHG

## 2023-09-11 VITALS — SYSTOLIC BLOOD PRESSURE: 130 MMHG | TEMPERATURE: 100 F | OXYGEN SATURATION: 97 % | DIASTOLIC BLOOD PRESSURE: 68 MMHG

## 2023-09-11 LAB
BASOPHILS # BLD AUTO: 0.1 K/UL (ref 0–0.2)
BASOPHILS NFR BLD AUTO: 0.5 % (ref 0–2)
BUN SERPL-MCNC: 24 MG/DL (ref 7–18)
CALCIUM SERPL-MCNC: 9.5 MG/DL (ref 8.5–10.1)
CHLORIDE SERPL-SCNC: 112 MMOL/L (ref 98–107)
CO2 SERPL-SCNC: 23 MMOL/L (ref 21–32)
CREAT SERPL-MCNC: 0.7 MG/DL (ref 0.6–1.3)
EOSINOPHIL # BLD AUTO: 1 K/UL (ref 0–0.7)
EOSINOPHIL NFR BLD AUTO: 6.3 % (ref 0–6)
ERYTHROCYTE [DISTWIDTH] IN BLOOD BY AUTOMATED COUNT: 20.8 % (ref 11.5–15)
GLUCOSE SERPL-MCNC: 144 MG/DL (ref 74–106)
HCT VFR BLD AUTO: 32 % (ref 33–45)
HGB BLD-MCNC: 9.9 G/DL (ref 11.5–14.8)
LYMPHOCYTES NFR BLD AUTO: 1.8 K/UL (ref 0.8–4.8)
LYMPHOCYTES NFR BLD AUTO: 11.1 % (ref 20–44)
MAGNESIUM SERPL-MCNC: 1.9 MG/DL (ref 1.8–2.4)
MCH RBC QN AUTO: 25 PG (ref 26–33)
MCHC RBC AUTO-ENTMCNC: 31 G/DL (ref 31–36)
MCV RBC AUTO: 82 FL (ref 82–100)
MONOCYTES NFR BLD AUTO: 1.1 K/UL (ref 0.1–1.3)
MONOCYTES NFR BLD AUTO: 6.7 % (ref 2–12)
NEUTROPHILS # BLD AUTO: 12.4 K/UL (ref 1.8–8.9)
NEUTROPHILS NFR BLD AUTO: 75.4 % (ref 43–81)
PHOSPHATE SERPL-MCNC: 2 MG/DL (ref 2.5–4.9)
PLATELET # BLD AUTO: 353 K/UL (ref 150–450)
POTASSIUM SERPL-SCNC: 4.4 MMOL/L (ref 3.5–5.1)
RBC # BLD AUTO: 3.89 MIL/UL (ref 4–5.2)
SODIUM SERPL-SCNC: 143 MMOL/L (ref 136–145)
WBC NRBC COR # BLD AUTO: 16.4 K/UL (ref 4.3–11)

## 2023-09-11 PROCEDURE — 0KBG0ZZ EXCISION OF LEFT TRUNK MUSCLE, OPEN APPROACH: ICD-10-PCS

## 2023-09-11 PROCEDURE — 0KBS0ZZ EXCISION OF RIGHT LOWER LEG MUSCLE, OPEN APPROACH: ICD-10-PCS

## 2023-09-11 RX ADMIN — METOPROLOL TARTRATE SCH MG: 25 TABLET, FILM COATED ORAL at 20:40

## 2023-09-11 RX ADMIN — METOPROLOL TARTRATE SCH MG: 25 TABLET, FILM COATED ORAL at 10:50

## 2023-09-11 RX ADMIN — ENOXAPARIN SODIUM SCH MG: 40 INJECTION SUBCUTANEOUS at 11:08

## 2023-09-11 RX ADMIN — Medication SCH APPLIC: at 11:07

## 2023-09-11 RX ADMIN — LOSARTAN POTASSIUM SCH MG: 50 TABLET, FILM COATED ORAL at 16:21

## 2023-09-11 RX ADMIN — TRAMADOL HYDROCHLORIDE SCH MG: 50 TABLET, FILM COATED ORAL at 16:22

## 2023-09-11 RX ADMIN — QUETIAPINE SCH MG: 25 TABLET, FILM COATED ORAL at 10:49

## 2023-09-11 RX ADMIN — MEROPENEM SCH MLS/HR: 500 INJECTION INTRAVENOUS at 14:18

## 2023-09-11 RX ADMIN — CHLORHEXIDINE GLUCONATE 0.12% ORAL RINSE SCH ML: 1.2 LIQUID ORAL at 16:19

## 2023-09-11 RX ADMIN — THERA TABS SCH UDTAB: TAB at 11:07

## 2023-09-11 RX ADMIN — Medication SCH MLS/HR: at 16:19

## 2023-09-11 RX ADMIN — AMLODIPINE BESYLATE SCH MG: 5 TABLET ORAL at 10:50

## 2023-09-11 RX ADMIN — MEROPENEM SCH MLS/HR: 500 INJECTION INTRAVENOUS at 20:38

## 2023-09-11 RX ADMIN — MUPIROCIN SCH APPLIC: 20 OINTMENT TOPICAL at 20:40

## 2023-09-11 RX ADMIN — DOCUSATE SODIUM SCH MG: 50 LIQUID ORAL at 10:48

## 2023-09-11 RX ADMIN — TRAMADOL HYDROCHLORIDE SCH MG: 50 TABLET, FILM COATED ORAL at 10:49

## 2023-09-11 RX ADMIN — LOSARTAN POTASSIUM SCH MG: 50 TABLET, FILM COATED ORAL at 10:50

## 2023-09-11 RX ADMIN — CHLORHEXIDINE GLUCONATE 0.12% ORAL RINSE SCH ML: 1.2 LIQUID ORAL at 10:48

## 2023-09-11 RX ADMIN — ASPIRIN 81 MG SCH MG: 81 TABLET ORAL at 17:30

## 2023-09-11 RX ADMIN — Medication SCH EACH: at 16:22

## 2023-09-11 RX ADMIN — OXYCODONE HYDROCHLORIDE AND ACETAMINOPHEN SCH MG: 500 TABLET ORAL at 10:48

## 2023-09-11 RX ADMIN — CALCIUM SCH MG: 500 TABLET ORAL at 17:30

## 2023-09-11 RX ADMIN — MUPIROCIN SCH APPLIC: 20 OINTMENT TOPICAL at 11:07

## 2023-09-11 RX ADMIN — Medication SCH EACH: at 10:51

## 2023-09-11 RX ADMIN — Medication SCH MG: at 10:48

## 2023-09-11 RX ADMIN — DEXTROSE MONOHYDRATE SCH MLS/HR: 50 INJECTION, SOLUTION INTRAVENOUS at 21:26

## 2023-09-11 RX ADMIN — DEXTROSE MONOHYDRATE SCH MLS/HR: 50 INJECTION, SOLUTION INTRAVENOUS at 08:35

## 2023-09-11 RX ADMIN — Medication PRN ML: at 17:41

## 2023-09-11 RX ADMIN — MEROPENEM SCH MLS/HR: 500 INJECTION INTRAVENOUS at 05:04

## 2023-09-11 RX ADMIN — QUETIAPINE SCH MG: 25 TABLET, FILM COATED ORAL at 21:27

## 2023-09-11 RX ADMIN — PANTOPRAZOLE SODIUM SCH MG: 40 GRANULE, DELAYED RELEASE ORAL at 20:39

## 2023-09-12 VITALS — DIASTOLIC BLOOD PRESSURE: 69 MMHG | SYSTOLIC BLOOD PRESSURE: 127 MMHG | TEMPERATURE: 98.6 F | OXYGEN SATURATION: 97 %

## 2023-09-12 VITALS — OXYGEN SATURATION: 95 % | SYSTOLIC BLOOD PRESSURE: 109 MMHG | DIASTOLIC BLOOD PRESSURE: 66 MMHG | TEMPERATURE: 99.6 F

## 2023-09-12 VITALS — OXYGEN SATURATION: 98 % | DIASTOLIC BLOOD PRESSURE: 58 MMHG | SYSTOLIC BLOOD PRESSURE: 119 MMHG | TEMPERATURE: 98.2 F

## 2023-09-12 LAB
ANISOCYTOSIS BLD QL: (no result)
BASOPHILS # BLD AUTO: 0.1 K/UL (ref 0–0.2)
BASOPHILS NFR BLD AUTO: 0.7 % (ref 0–2)
BUN SERPL-MCNC: 23 MG/DL (ref 7–18)
CALCIUM SERPL-MCNC: 9.4 MG/DL (ref 8.5–10.1)
CHLORIDE SERPL-SCNC: 108 MMOL/L (ref 98–107)
CO2 SERPL-SCNC: 25 MMOL/L (ref 21–32)
CREAT SERPL-MCNC: 0.6 MG/DL (ref 0.6–1.3)
EOSINOPHIL # BLD AUTO: 1.3 K/UL (ref 0–0.7)
EOSINOPHIL NFR BLD AUTO: 7.5 % (ref 0–6)
EOSINOPHIL NFR BLD MANUAL: 11 % (ref 0–4)
ERYTHROCYTE [DISTWIDTH] IN BLOOD BY AUTOMATED COUNT: 20.2 % (ref 11.5–15)
GLUCOSE SERPL-MCNC: 120 MG/DL (ref 74–106)
HCT VFR BLD AUTO: 34 % (ref 33–45)
HGB BLD-MCNC: 10.7 G/DL (ref 11.5–14.8)
LYMPHOCYTES NFR BLD AUTO: 1.8 K/UL (ref 0.8–4.8)
LYMPHOCYTES NFR BLD AUTO: 10.7 % (ref 20–44)
LYMPHOCYTES NFR BLD MANUAL: 18 % (ref 16–48)
MCH RBC QN AUTO: 26 PG (ref 26–33)
MCHC RBC AUTO-ENTMCNC: 31 G/DL (ref 31–36)
MCV RBC AUTO: 82 FL (ref 82–100)
MONOCYTES NFR BLD AUTO: 1 K/UL (ref 0.1–1.3)
MONOCYTES NFR BLD AUTO: 6.1 % (ref 2–12)
MONOCYTES NFR BLD MANUAL: 4 % (ref 0–11)
NEUTROPHILS # BLD AUTO: 12.6 K/UL (ref 1.8–8.9)
NEUTROPHILS NFR BLD AUTO: 75 % (ref 43–81)
NEUTS SEG NFR BLD MANUAL: 67 % (ref 42–76)
OVALOCYTES BLD QL SMEAR: (no result)
PLATELET # BLD AUTO: 386 K/UL (ref 150–450)
PLATELET BLD QL SMEAR: ADEQUATE
POTASSIUM SERPL-SCNC: 4.7 MMOL/L (ref 3.5–5.1)
RBC # BLD AUTO: 4.2 MIL/UL (ref 4–5.2)
SODIUM SERPL-SCNC: 140 MMOL/L (ref 136–145)
WBC NRBC COR # BLD AUTO: 16.9 K/UL (ref 4.3–11)

## 2023-09-12 RX ADMIN — MEROPENEM SCH MLS/HR: 500 INJECTION INTRAVENOUS at 04:40

## 2023-09-12 RX ADMIN — QUETIAPINE SCH MG: 25 TABLET, FILM COATED ORAL at 21:51

## 2023-09-12 RX ADMIN — MUPIROCIN SCH APPLIC: 20 OINTMENT TOPICAL at 09:28

## 2023-09-12 RX ADMIN — DEXTROSE MONOHYDRATE SCH MLS/HR: 50 INJECTION, SOLUTION INTRAVENOUS at 21:51

## 2023-09-12 RX ADMIN — CALCIUM SCH MG: 500 TABLET ORAL at 17:56

## 2023-09-12 RX ADMIN — LOSARTAN POTASSIUM SCH MG: 50 TABLET, FILM COATED ORAL at 09:00

## 2023-09-12 RX ADMIN — Medication SCH APPLIC: at 09:26

## 2023-09-12 RX ADMIN — PANTOPRAZOLE SODIUM SCH MG: 40 GRANULE, DELAYED RELEASE ORAL at 20:37

## 2023-09-12 RX ADMIN — METOPROLOL TARTRATE SCH MG: 25 TABLET, FILM COATED ORAL at 20:38

## 2023-09-12 RX ADMIN — TRAMADOL HYDROCHLORIDE SCH MG: 50 TABLET, FILM COATED ORAL at 17:56

## 2023-09-12 RX ADMIN — MUPIROCIN SCH APPLIC: 20 OINTMENT TOPICAL at 20:38

## 2023-09-12 RX ADMIN — Medication SCH EACH: at 17:57

## 2023-09-12 RX ADMIN — ASPIRIN 81 MG SCH MG: 81 TABLET ORAL at 17:56

## 2023-09-12 RX ADMIN — DEXTROSE MONOHYDRATE SCH MLS/HR: 50 INJECTION, SOLUTION INTRAVENOUS at 10:45

## 2023-09-12 RX ADMIN — ENOXAPARIN SODIUM SCH MG: 40 INJECTION SUBCUTANEOUS at 09:31

## 2023-09-12 RX ADMIN — MEROPENEM SCH MLS/HR: 500 INJECTION INTRAVENOUS at 20:37

## 2023-09-12 RX ADMIN — METOPROLOL TARTRATE SCH MG: 25 TABLET, FILM COATED ORAL at 09:18

## 2023-09-12 RX ADMIN — DOCUSATE SODIUM SCH MG: 50 LIQUID ORAL at 09:17

## 2023-09-12 RX ADMIN — MEROPENEM SCH MLS/HR: 500 INJECTION INTRAVENOUS at 13:19

## 2023-09-12 RX ADMIN — Medication SCH MG: at 09:19

## 2023-09-12 RX ADMIN — AMLODIPINE BESYLATE SCH MG: 5 TABLET ORAL at 09:00

## 2023-09-12 RX ADMIN — Medication SCH EACH: at 09:17

## 2023-09-12 RX ADMIN — CHLORHEXIDINE GLUCONATE 0.12% ORAL RINSE SCH ML: 1.2 LIQUID ORAL at 17:56

## 2023-09-12 RX ADMIN — CHLORHEXIDINE GLUCONATE 0.12% ORAL RINSE SCH ML: 1.2 LIQUID ORAL at 09:17

## 2023-09-12 RX ADMIN — TRAMADOL HYDROCHLORIDE SCH MG: 50 TABLET, FILM COATED ORAL at 09:17

## 2023-09-12 RX ADMIN — THERA TABS SCH UDTAB: TAB at 09:17

## 2023-09-12 RX ADMIN — Medication PRN ML: at 06:02

## 2023-09-12 RX ADMIN — FLUCONAZOLE SCH MG: 100 TABLET ORAL at 17:56

## 2023-09-12 RX ADMIN — PANTOPRAZOLE SODIUM SCH MG: 40 GRANULE, DELAYED RELEASE ORAL at 09:18

## 2023-09-12 RX ADMIN — OXYCODONE HYDROCHLORIDE AND ACETAMINOPHEN SCH MG: 500 TABLET ORAL at 09:18

## 2023-09-12 RX ADMIN — LOSARTAN POTASSIUM SCH MG: 50 TABLET, FILM COATED ORAL at 17:57

## 2023-09-13 VITALS — SYSTOLIC BLOOD PRESSURE: 122 MMHG | DIASTOLIC BLOOD PRESSURE: 77 MMHG | TEMPERATURE: 98.4 F | OXYGEN SATURATION: 98 %

## 2023-09-13 VITALS — DIASTOLIC BLOOD PRESSURE: 81 MMHG | TEMPERATURE: 98.4 F | SYSTOLIC BLOOD PRESSURE: 121 MMHG | OXYGEN SATURATION: 95 %

## 2023-09-13 VITALS — SYSTOLIC BLOOD PRESSURE: 106 MMHG | DIASTOLIC BLOOD PRESSURE: 49 MMHG | TEMPERATURE: 97.7 F | OXYGEN SATURATION: 97 %

## 2023-09-13 VITALS — OXYGEN SATURATION: 96 % | SYSTOLIC BLOOD PRESSURE: 112 MMHG | TEMPERATURE: 98.9 F | DIASTOLIC BLOOD PRESSURE: 68 MMHG

## 2023-09-13 LAB
BUN SERPL-MCNC: 26 MG/DL (ref 7–18)
CALCIUM SERPL-MCNC: 9.9 MG/DL (ref 8.5–10.1)
CHLORIDE SERPL-SCNC: 104 MMOL/L (ref 98–107)
CO2 SERPL-SCNC: 25 MMOL/L (ref 21–32)
CREAT SERPL-MCNC: 0.6 MG/DL (ref 0.6–1.3)
GLUCOSE SERPL-MCNC: 150 MG/DL (ref 74–106)
POTASSIUM SERPL-SCNC: 4 MMOL/L (ref 3.5–5.1)
SODIUM SERPL-SCNC: 138 MMOL/L (ref 136–145)

## 2023-09-13 RX ADMIN — Medication SCH EACH: at 08:50

## 2023-09-13 RX ADMIN — THERA TABS SCH UDTAB: TAB at 08:48

## 2023-09-13 RX ADMIN — Medication SCH EACH: at 16:22

## 2023-09-13 RX ADMIN — DOCUSATE SODIUM SCH MG: 50 LIQUID ORAL at 08:49

## 2023-09-13 RX ADMIN — TRAMADOL HYDROCHLORIDE SCH MG: 50 TABLET, FILM COATED ORAL at 08:54

## 2023-09-13 RX ADMIN — MEROPENEM SCH MLS/HR: 500 INJECTION INTRAVENOUS at 12:19

## 2023-09-13 RX ADMIN — DEXTROSE MONOHYDRATE SCH MLS/HR: 50 INJECTION, SOLUTION INTRAVENOUS at 21:07

## 2023-09-13 RX ADMIN — ENOXAPARIN SODIUM SCH MG: 40 INJECTION SUBCUTANEOUS at 09:47

## 2023-09-13 RX ADMIN — TRAMADOL HYDROCHLORIDE SCH MG: 50 TABLET, FILM COATED ORAL at 16:20

## 2023-09-13 RX ADMIN — CHLORHEXIDINE GLUCONATE 0.12% ORAL RINSE SCH ML: 1.2 LIQUID ORAL at 16:20

## 2023-09-13 RX ADMIN — Medication SCH MG: at 08:46

## 2023-09-13 RX ADMIN — CALCIUM SCH MG: 500 TABLET ORAL at 17:02

## 2023-09-13 RX ADMIN — PANTOPRAZOLE SODIUM SCH MG: 40 GRANULE, DELAYED RELEASE ORAL at 21:04

## 2023-09-13 RX ADMIN — PANTOPRAZOLE SODIUM SCH MG: 40 GRANULE, DELAYED RELEASE ORAL at 08:48

## 2023-09-13 RX ADMIN — OXYCODONE HYDROCHLORIDE AND ACETAMINOPHEN SCH MG: 500 TABLET ORAL at 08:48

## 2023-09-13 RX ADMIN — CHLORHEXIDINE GLUCONATE 0.12% ORAL RINSE SCH ML: 1.2 LIQUID ORAL at 08:49

## 2023-09-13 RX ADMIN — FLUCONAZOLE SCH MG: 100 TABLET ORAL at 16:00

## 2023-09-13 RX ADMIN — QUETIAPINE SCH MG: 25 TABLET, FILM COATED ORAL at 21:04

## 2023-09-13 RX ADMIN — Medication PRN ML: at 21:35

## 2023-09-13 RX ADMIN — AMLODIPINE BESYLATE SCH MG: 5 TABLET ORAL at 08:47

## 2023-09-13 RX ADMIN — LOSARTAN POTASSIUM SCH MG: 50 TABLET, FILM COATED ORAL at 08:48

## 2023-09-13 RX ADMIN — DEXTROSE MONOHYDRATE SCH MLS/HR: 50 INJECTION, SOLUTION INTRAVENOUS at 09:30

## 2023-09-13 RX ADMIN — Medication PRN ML: at 02:12

## 2023-09-13 RX ADMIN — Medication SCH APPLIC: at 10:01

## 2023-09-13 RX ADMIN — LOSARTAN POTASSIUM SCH MG: 50 TABLET, FILM COATED ORAL at 16:17

## 2023-09-13 RX ADMIN — MEROPENEM SCH MLS/HR: 500 INJECTION INTRAVENOUS at 20:22

## 2023-09-13 RX ADMIN — METOPROLOL TARTRATE SCH MG: 25 TABLET, FILM COATED ORAL at 08:49

## 2023-09-13 RX ADMIN — MEROPENEM SCH MLS/HR: 500 INJECTION INTRAVENOUS at 05:04

## 2023-09-13 RX ADMIN — MUPIROCIN SCH APPLIC: 20 OINTMENT TOPICAL at 21:07

## 2023-09-13 RX ADMIN — MUPIROCIN SCH APPLIC: 20 OINTMENT TOPICAL at 10:00

## 2023-09-13 RX ADMIN — ASPIRIN 81 MG SCH MG: 81 TABLET ORAL at 17:02

## 2023-09-13 RX ADMIN — METOPROLOL TARTRATE SCH MG: 25 TABLET, FILM COATED ORAL at 21:06

## 2023-09-14 VITALS — TEMPERATURE: 98.4 F | SYSTOLIC BLOOD PRESSURE: 148 MMHG | DIASTOLIC BLOOD PRESSURE: 69 MMHG | OXYGEN SATURATION: 96 %

## 2023-09-14 VITALS — SYSTOLIC BLOOD PRESSURE: 123 MMHG | TEMPERATURE: 98.8 F | DIASTOLIC BLOOD PRESSURE: 62 MMHG | OXYGEN SATURATION: 95 %

## 2023-09-14 VITALS — DIASTOLIC BLOOD PRESSURE: 85 MMHG | SYSTOLIC BLOOD PRESSURE: 132 MMHG | OXYGEN SATURATION: 95 % | TEMPERATURE: 97.8 F

## 2023-09-14 VITALS — OXYGEN SATURATION: 98 % | SYSTOLIC BLOOD PRESSURE: 142 MMHG | TEMPERATURE: 98.2 F | DIASTOLIC BLOOD PRESSURE: 79 MMHG

## 2023-09-14 LAB
BUN SERPL-MCNC: 47 MG/DL (ref 7–18)
CALCIUM SERPL-MCNC: 9.8 MG/DL (ref 8.5–10.1)
CHLORIDE SERPL-SCNC: 109 MMOL/L (ref 98–107)
CO2 SERPL-SCNC: 23 MMOL/L (ref 21–32)
CREAT SERPL-MCNC: 0.7 MG/DL (ref 0.6–1.3)
GLUCOSE SERPL-MCNC: 142 MG/DL (ref 74–106)
POTASSIUM SERPL-SCNC: 4.2 MMOL/L (ref 3.5–5.1)
SODIUM SERPL-SCNC: 141 MMOL/L (ref 136–145)

## 2023-09-14 RX ADMIN — MUPIROCIN SCH APPLIC: 20 OINTMENT TOPICAL at 21:00

## 2023-09-14 RX ADMIN — DOCUSATE SODIUM SCH MG: 50 LIQUID ORAL at 08:58

## 2023-09-14 RX ADMIN — Medication SCH MG: at 09:11

## 2023-09-14 RX ADMIN — METOPROLOL TARTRATE SCH MG: 25 TABLET, FILM COATED ORAL at 23:03

## 2023-09-14 RX ADMIN — QUETIAPINE SCH MG: 25 TABLET, FILM COATED ORAL at 22:00

## 2023-09-14 RX ADMIN — MUPIROCIN SCH APPLIC: 20 OINTMENT TOPICAL at 09:21

## 2023-09-14 RX ADMIN — Medication PRN ML: at 15:15

## 2023-09-14 RX ADMIN — MEROPENEM SCH MLS/HR: 500 INJECTION INTRAVENOUS at 23:02

## 2023-09-14 RX ADMIN — DEXTROSE MONOHYDRATE SCH MLS/HR: 50 INJECTION, SOLUTION INTRAVENOUS at 21:52

## 2023-09-14 RX ADMIN — MEROPENEM SCH MLS/HR: 500 INJECTION INTRAVENOUS at 12:01

## 2023-09-14 RX ADMIN — TRAMADOL HYDROCHLORIDE SCH MG: 50 TABLET, FILM COATED ORAL at 08:58

## 2023-09-14 RX ADMIN — PANTOPRAZOLE SODIUM SCH MG: 40 GRANULE, DELAYED RELEASE ORAL at 23:02

## 2023-09-14 RX ADMIN — MEROPENEM SCH MLS/HR: 500 INJECTION INTRAVENOUS at 04:48

## 2023-09-14 RX ADMIN — LOSARTAN POTASSIUM SCH MG: 50 TABLET, FILM COATED ORAL at 08:59

## 2023-09-14 RX ADMIN — CHLORHEXIDINE GLUCONATE 0.12% ORAL RINSE SCH ML: 1.2 LIQUID ORAL at 16:08

## 2023-09-14 RX ADMIN — TRAMADOL HYDROCHLORIDE SCH MG: 50 TABLET, FILM COATED ORAL at 17:17

## 2023-09-14 RX ADMIN — Medication SCH EACH: at 09:11

## 2023-09-14 RX ADMIN — DEXTROSE AND SODIUM CHLORIDE PRN MLS/HR: 5; 450 INJECTION, SOLUTION INTRAVENOUS at 09:12

## 2023-09-14 RX ADMIN — DEXTROSE MONOHYDRATE SCH MLS/HR: 50 INJECTION, SOLUTION INTRAVENOUS at 09:11

## 2023-09-14 RX ADMIN — LOSARTAN POTASSIUM SCH MG: 50 TABLET, FILM COATED ORAL at 16:03

## 2023-09-14 RX ADMIN — ENOXAPARIN SODIUM SCH MG: 40 INJECTION SUBCUTANEOUS at 09:04

## 2023-09-14 RX ADMIN — ASPIRIN 81 MG SCH MG: 81 TABLET ORAL at 17:17

## 2023-09-14 RX ADMIN — AMLODIPINE BESYLATE SCH MG: 5 TABLET ORAL at 08:59

## 2023-09-14 RX ADMIN — CHLORHEXIDINE GLUCONATE 0.12% ORAL RINSE SCH ML: 1.2 LIQUID ORAL at 08:58

## 2023-09-14 RX ADMIN — Medication SCH APPLIC: at 09:02

## 2023-09-14 RX ADMIN — THERA TABS SCH UDTAB: TAB at 08:58

## 2023-09-14 RX ADMIN — FLUCONAZOLE SCH MG: 100 TABLET ORAL at 15:16

## 2023-09-14 RX ADMIN — CALCIUM SCH MG: 500 TABLET ORAL at 17:17

## 2023-09-14 RX ADMIN — METOPROLOL TARTRATE SCH MG: 25 TABLET, FILM COATED ORAL at 09:00

## 2023-09-14 RX ADMIN — OXYCODONE HYDROCHLORIDE AND ACETAMINOPHEN SCH MG: 500 TABLET ORAL at 08:58

## 2023-09-14 RX ADMIN — PANTOPRAZOLE SODIUM SCH MG: 40 GRANULE, DELAYED RELEASE ORAL at 08:58

## 2023-09-14 RX ADMIN — Medication SCH EACH: at 16:06

## 2023-09-15 VITALS — TEMPERATURE: 99 F | OXYGEN SATURATION: 93 % | DIASTOLIC BLOOD PRESSURE: 44 MMHG | SYSTOLIC BLOOD PRESSURE: 64 MMHG

## 2023-09-15 VITALS — DIASTOLIC BLOOD PRESSURE: 59 MMHG | TEMPERATURE: 98.4 F | OXYGEN SATURATION: 94 % | SYSTOLIC BLOOD PRESSURE: 111 MMHG

## 2023-09-15 VITALS — OXYGEN SATURATION: 97 %

## 2023-09-15 VITALS — OXYGEN SATURATION: 98 %

## 2023-09-15 VITALS — DIASTOLIC BLOOD PRESSURE: 78 MMHG | SYSTOLIC BLOOD PRESSURE: 133 MMHG | OXYGEN SATURATION: 80 % | TEMPERATURE: 98.1 F

## 2023-09-15 VITALS — OXYGEN SATURATION: 96 %

## 2023-09-15 LAB
ANISOCYTOSIS BLD QL: (no result)
BASE EXCESS BLDA CALC-SCNC: 3.4 MMOL/L
BASE EXCESS BLDA CALC-SCNC: 3.7 MMOL/L
BASOPHILS # BLD AUTO: 0.1 K/UL (ref 0–0.2)
BASOPHILS NFR BLD AUTO: 0.2 % (ref 0–2)
BASOPHILS NFR BLD MANUAL: 0 % (ref 0–2)
BUN SERPL-MCNC: 31 MG/DL (ref 7–18)
CALCIUM SERPL-MCNC: 9.6 MG/DL (ref 8.5–10.1)
CHLORIDE SERPL-SCNC: 110 MMOL/L (ref 98–107)
CO2 SERPL-SCNC: 25 MMOL/L (ref 21–32)
CREAT SERPL-MCNC: 0.7 MG/DL (ref 0.6–1.3)
DO-HGB MFR BLDA: 135.8 MMHG
DO-HGB MFR BLDA: 148.1 MMHG
EOSINOPHIL # BLD AUTO: 0 K/UL (ref 0–0.7)
EOSINOPHIL NFR BLD AUTO: 0.1 % (ref 0–6)
EOSINOPHIL NFR BLD MANUAL: 0 % (ref 0–4)
ERYTHROCYTE [DISTWIDTH] IN BLOOD BY AUTOMATED COUNT: 19.7 % (ref 11.5–15)
GAS PNL BLDA: 12.5 G/DL (ref 12–16)
GAS PNL BLDA: 12.5 G/DL (ref 12–16)
GLUCOSE SERPL-MCNC: 181 MG/DL (ref 74–106)
HCT VFR BLD AUTO: 37 % (ref 33–45)
HGB BLD-MCNC: 11.2 G/DL (ref 11.5–14.8)
INHALED O2 CONCENTRATION: 32 %
INHALED O2 CONCENTRATION: 36 %
INHALED O2 FLOW RATE: 3 L/MIN (ref 0–30)
LYMPHOCYTES NFR BLD AUTO: 1.8 K/UL (ref 0.8–4.8)
LYMPHOCYTES NFR BLD AUTO: 5.2 % (ref 20–44)
LYMPHOCYTES NFR BLD MANUAL: 5 % (ref 16–48)
MAGNESIUM SERPL-MCNC: 2.3 MG/DL (ref 1.8–2.4)
MCH RBC QN AUTO: 25 PG (ref 26–33)
MCHC RBC AUTO-ENTMCNC: 31 G/DL (ref 31–36)
MCV RBC AUTO: 81 FL (ref 82–100)
MONOCYTES NFR BLD AUTO: 2 K/UL (ref 0.1–1.3)
MONOCYTES NFR BLD AUTO: 5.6 % (ref 2–12)
MONOCYTES NFR BLD MANUAL: 4 % (ref 0–11)
NEUTROPHILS # BLD AUTO: 31.1 K/UL (ref 1.8–8.9)
NEUTROPHILS NFR BLD AUTO: 88.9 % (ref 43–81)
NEUTS SEG NFR BLD MANUAL: 91 % (ref 42–76)
PCO2 TEMP ADJ BLDA: 29 MMHG (ref 35–45)
PCO2 TEMP ADJ BLDA: 29.5 MMHG (ref 35–45)
PH TEMP ADJ BLDA: 7.55 [PH] (ref 7.35–7.45)
PH TEMP ADJ BLDA: 7.55 [PH] (ref 7.35–7.45)
PLATELET # BLD AUTO: 440 K/UL (ref 150–450)
PLATELET BLD QL SMEAR: ADEQUATE
PO2 TEMP ADJ BLDA: 58.4 MMHG (ref 75–100)
PO2 TEMP ADJ BLDA: 74.4 MMHG (ref 75–100)
POTASSIUM SERPL-SCNC: 3.5 MMOL/L (ref 3.5–5.1)
RBC # BLD AUTO: 4.5 MIL/UL (ref 4–5.2)
SAO2 % BLDA: 91.5 % (ref 92–98.5)
SAO2 % BLDA: 95.2 % (ref 92–98.5)
SODIUM SERPL-SCNC: 146 MMOL/L (ref 136–145)
VENTILATION MODE VENT: (no result)
WBC NRBC COR # BLD AUTO: 35 K/UL (ref 4.3–11)

## 2023-09-15 RX ADMIN — MEROPENEM SCH MLS/HR: 500 INJECTION INTRAVENOUS at 20:47

## 2023-09-15 RX ADMIN — METOPROLOL TARTRATE SCH MG: 25 TABLET, FILM COATED ORAL at 20:50

## 2023-09-15 RX ADMIN — METOPROLOL TARTRATE SCH MG: 25 TABLET, FILM COATED ORAL at 08:44

## 2023-09-15 RX ADMIN — FLUCONAZOLE SCH MG: 100 TABLET ORAL at 16:21

## 2023-09-15 RX ADMIN — ASPIRIN 81 MG SCH MG: 81 TABLET ORAL at 18:09

## 2023-09-15 RX ADMIN — LOSARTAN POTASSIUM SCH MG: 50 TABLET, FILM COATED ORAL at 08:43

## 2023-09-15 RX ADMIN — CALCIUM SCH MG: 500 TABLET ORAL at 18:08

## 2023-09-15 RX ADMIN — MUPIROCIN SCH APPLIC: 20 OINTMENT TOPICAL at 08:48

## 2023-09-15 RX ADMIN — CHLORHEXIDINE GLUCONATE 0.12% ORAL RINSE SCH ML: 1.2 LIQUID ORAL at 16:22

## 2023-09-15 RX ADMIN — TRAMADOL HYDROCHLORIDE SCH MG: 50 TABLET, FILM COATED ORAL at 16:21

## 2023-09-15 RX ADMIN — AMLODIPINE BESYLATE SCH MG: 5 TABLET ORAL at 08:45

## 2023-09-15 RX ADMIN — LOSARTAN POTASSIUM SCH MG: 50 TABLET, FILM COATED ORAL at 17:00

## 2023-09-15 RX ADMIN — ACETYLCYSTEINE SCH MG: 100 INHALANT RESPIRATORY (INHALATION) at 15:21

## 2023-09-15 RX ADMIN — ACETAMINOPHEN PRN MG: 650 SUPPOSITORY RECTAL at 12:06

## 2023-09-15 RX ADMIN — ACETYLCYSTEINE SCH MG: 100 INHALANT RESPIRATORY (INHALATION) at 13:52

## 2023-09-15 RX ADMIN — OXYCODONE HYDROCHLORIDE AND ACETAMINOPHEN SCH MG: 500 TABLET ORAL at 08:43

## 2023-09-15 RX ADMIN — TRAMADOL HYDROCHLORIDE SCH MG: 50 TABLET, FILM COATED ORAL at 08:44

## 2023-09-15 RX ADMIN — Medication SCH APPLIC: at 08:48

## 2023-09-15 RX ADMIN — Medication SCH MG: at 13:52

## 2023-09-15 RX ADMIN — MEROPENEM SCH MLS/HR: 500 INJECTION INTRAVENOUS at 06:03

## 2023-09-15 RX ADMIN — ACETYLCYSTEINE SCH MG: 100 INHALANT RESPIRATORY (INHALATION) at 23:08

## 2023-09-15 RX ADMIN — DOCUSATE SODIUM SCH MG: 50 LIQUID ORAL at 08:42

## 2023-09-15 RX ADMIN — ALBUTEROL SULFATE SCH MG: 1.25 SOLUTION RESPIRATORY (INHALATION) at 19:39

## 2023-09-15 RX ADMIN — CHLORHEXIDINE GLUCONATE 0.12% ORAL RINSE SCH ML: 1.2 LIQUID ORAL at 08:42

## 2023-09-15 RX ADMIN — THERA TABS SCH UDTAB: TAB at 08:52

## 2023-09-15 RX ADMIN — Medication SCH MG: at 08:42

## 2023-09-15 RX ADMIN — MEROPENEM SCH MLS/HR: 500 INJECTION INTRAVENOUS at 12:31

## 2023-09-15 RX ADMIN — DEXTROSE AND SODIUM CHLORIDE PRN MLS/HR: 5; 450 INJECTION, SOLUTION INTRAVENOUS at 20:08

## 2023-09-15 RX ADMIN — Medication SCH EACH: at 16:22

## 2023-09-15 RX ADMIN — DEXTROSE MONOHYDRATE SCH MLS/HR: 50 INJECTION, SOLUTION INTRAVENOUS at 09:02

## 2023-09-15 RX ADMIN — Medication SCH MG: at 19:39

## 2023-09-15 RX ADMIN — ENOXAPARIN SODIUM SCH MG: 40 INJECTION SUBCUTANEOUS at 08:53

## 2023-09-15 RX ADMIN — ALBUTEROL SULFATE SCH MG: 1.25 SOLUTION RESPIRATORY (INHALATION) at 13:52

## 2023-09-15 RX ADMIN — QUETIAPINE SCH MG: 25 TABLET, FILM COATED ORAL at 22:00

## 2023-09-15 RX ADMIN — PANTOPRAZOLE SODIUM SCH MG: 40 GRANULE, DELAYED RELEASE ORAL at 08:42

## 2023-09-15 RX ADMIN — Medication SCH EACH: at 08:49

## 2023-09-15 RX ADMIN — PANTOPRAZOLE SODIUM SCH MG: 40 GRANULE, DELAYED RELEASE ORAL at 20:50

## 2023-09-15 RX ADMIN — DEXTROSE MONOHYDRATE SCH MLS/HR: 50 INJECTION, SOLUTION INTRAVENOUS at 22:00

## 2023-09-16 VITALS — DIASTOLIC BLOOD PRESSURE: 88 MMHG | SYSTOLIC BLOOD PRESSURE: 133 MMHG | OXYGEN SATURATION: 95 %

## 2023-09-16 VITALS — DIASTOLIC BLOOD PRESSURE: 118 MMHG | SYSTOLIC BLOOD PRESSURE: 140 MMHG | OXYGEN SATURATION: 96 % | TEMPERATURE: 98.5 F

## 2023-09-16 VITALS — OXYGEN SATURATION: 96 % | DIASTOLIC BLOOD PRESSURE: 66 MMHG | SYSTOLIC BLOOD PRESSURE: 117 MMHG

## 2023-09-16 VITALS — OXYGEN SATURATION: 97 % | SYSTOLIC BLOOD PRESSURE: 142 MMHG | DIASTOLIC BLOOD PRESSURE: 64 MMHG

## 2023-09-16 VITALS — DIASTOLIC BLOOD PRESSURE: 75 MMHG | SYSTOLIC BLOOD PRESSURE: 125 MMHG | TEMPERATURE: 99 F | OXYGEN SATURATION: 97 %

## 2023-09-16 VITALS — SYSTOLIC BLOOD PRESSURE: 124 MMHG | OXYGEN SATURATION: 94 % | DIASTOLIC BLOOD PRESSURE: 65 MMHG

## 2023-09-16 VITALS — DIASTOLIC BLOOD PRESSURE: 67 MMHG | SYSTOLIC BLOOD PRESSURE: 121 MMHG | OXYGEN SATURATION: 95 %

## 2023-09-16 VITALS — OXYGEN SATURATION: 100 %

## 2023-09-16 VITALS — OXYGEN SATURATION: 92 % | TEMPERATURE: 99 F | SYSTOLIC BLOOD PRESSURE: 122 MMHG | DIASTOLIC BLOOD PRESSURE: 64 MMHG

## 2023-09-16 VITALS — OXYGEN SATURATION: 96 % | DIASTOLIC BLOOD PRESSURE: 67 MMHG | SYSTOLIC BLOOD PRESSURE: 125 MMHG

## 2023-09-16 VITALS — OXYGEN SATURATION: 97 %

## 2023-09-16 VITALS — DIASTOLIC BLOOD PRESSURE: 81 MMHG | OXYGEN SATURATION: 98 % | SYSTOLIC BLOOD PRESSURE: 150 MMHG

## 2023-09-16 VITALS — SYSTOLIC BLOOD PRESSURE: 109 MMHG | TEMPERATURE: 98.8 F | DIASTOLIC BLOOD PRESSURE: 50 MMHG | OXYGEN SATURATION: 94 %

## 2023-09-16 VITALS — DIASTOLIC BLOOD PRESSURE: 73 MMHG | OXYGEN SATURATION: 93 % | SYSTOLIC BLOOD PRESSURE: 128 MMHG

## 2023-09-16 VITALS — SYSTOLIC BLOOD PRESSURE: 122 MMHG | DIASTOLIC BLOOD PRESSURE: 63 MMHG | OXYGEN SATURATION: 96 %

## 2023-09-16 VITALS — OXYGEN SATURATION: 98 %

## 2023-09-16 VITALS — DIASTOLIC BLOOD PRESSURE: 68 MMHG | SYSTOLIC BLOOD PRESSURE: 110 MMHG | OXYGEN SATURATION: 93 %

## 2023-09-16 VITALS — SYSTOLIC BLOOD PRESSURE: 96 MMHG | DIASTOLIC BLOOD PRESSURE: 61 MMHG | OXYGEN SATURATION: 92 %

## 2023-09-16 VITALS — SYSTOLIC BLOOD PRESSURE: 101 MMHG | OXYGEN SATURATION: 93 % | DIASTOLIC BLOOD PRESSURE: 84 MMHG

## 2023-09-16 VITALS — SYSTOLIC BLOOD PRESSURE: 132 MMHG | TEMPERATURE: 98 F | OXYGEN SATURATION: 98 % | DIASTOLIC BLOOD PRESSURE: 72 MMHG

## 2023-09-16 VITALS — OXYGEN SATURATION: 97 % | SYSTOLIC BLOOD PRESSURE: 99 MMHG | DIASTOLIC BLOOD PRESSURE: 63 MMHG

## 2023-09-16 VITALS — OXYGEN SATURATION: 97 % | SYSTOLIC BLOOD PRESSURE: 109 MMHG | DIASTOLIC BLOOD PRESSURE: 63 MMHG

## 2023-09-16 VITALS — DIASTOLIC BLOOD PRESSURE: 81 MMHG | OXYGEN SATURATION: 96 % | SYSTOLIC BLOOD PRESSURE: 148 MMHG

## 2023-09-16 VITALS — DIASTOLIC BLOOD PRESSURE: 60 MMHG | SYSTOLIC BLOOD PRESSURE: 96 MMHG | OXYGEN SATURATION: 93 %

## 2023-09-16 VITALS — DIASTOLIC BLOOD PRESSURE: 47 MMHG | SYSTOLIC BLOOD PRESSURE: 112 MMHG | OXYGEN SATURATION: 94 %

## 2023-09-16 VITALS — SYSTOLIC BLOOD PRESSURE: 119 MMHG | OXYGEN SATURATION: 91 % | DIASTOLIC BLOOD PRESSURE: 62 MMHG

## 2023-09-16 VITALS — SYSTOLIC BLOOD PRESSURE: 114 MMHG | DIASTOLIC BLOOD PRESSURE: 79 MMHG | OXYGEN SATURATION: 96 %

## 2023-09-16 VITALS — SYSTOLIC BLOOD PRESSURE: 99 MMHG | OXYGEN SATURATION: 96 % | DIASTOLIC BLOOD PRESSURE: 50 MMHG

## 2023-09-16 VITALS — SYSTOLIC BLOOD PRESSURE: 144 MMHG | DIASTOLIC BLOOD PRESSURE: 77 MMHG | OXYGEN SATURATION: 97 %

## 2023-09-16 VITALS — OXYGEN SATURATION: 92 %

## 2023-09-16 LAB
ANISOCYTOSIS BLD QL: (no result)
BASOPHILS # BLD AUTO: 0 K/UL (ref 0–0.2)
BASOPHILS NFR BLD AUTO: 0.1 % (ref 0–2)
BASOPHILS NFR BLD MANUAL: 0 % (ref 0–2)
BUN SERPL-MCNC: 38 MG/DL (ref 7–18)
CALCIUM SERPL-MCNC: 9.7 MG/DL (ref 8.5–10.1)
CHLORIDE SERPL-SCNC: 111 MMOL/L (ref 98–107)
CO2 SERPL-SCNC: 26 MMOL/L (ref 21–32)
CREAT SERPL-MCNC: 1 MG/DL (ref 0.6–1.3)
EOSINOPHIL # BLD AUTO: 0 K/UL (ref 0–0.7)
EOSINOPHIL NFR BLD AUTO: 0 % (ref 0–6)
EOSINOPHIL NFR BLD MANUAL: 0 % (ref 0–4)
ERYTHROCYTE [DISTWIDTH] IN BLOOD BY AUTOMATED COUNT: 19.8 % (ref 11.5–15)
GLUCOSE SERPL-MCNC: 162 MG/DL (ref 74–106)
HCT VFR BLD AUTO: 34 % (ref 33–45)
HGB BLD-MCNC: 10.6 G/DL (ref 11.5–14.8)
LYMPHOCYTES NFR BLD AUTO: 1.2 K/UL (ref 0.8–4.8)
LYMPHOCYTES NFR BLD AUTO: 3.9 % (ref 20–44)
LYMPHOCYTES NFR BLD MANUAL: 5 % (ref 16–48)
MCH RBC QN AUTO: 25 PG (ref 26–33)
MCHC RBC AUTO-ENTMCNC: 31 G/DL (ref 31–36)
MCV RBC AUTO: 79 FL (ref 82–100)
MONOCYTES NFR BLD AUTO: 1.5 K/UL (ref 0.1–1.3)
MONOCYTES NFR BLD AUTO: 4.8 % (ref 2–12)
MONOCYTES NFR BLD MANUAL: 5 % (ref 0–11)
NEUTROPHILS # BLD AUTO: 28.5 K/UL (ref 1.8–8.9)
NEUTROPHILS NFR BLD AUTO: 91.2 % (ref 43–81)
NEUTS BAND NFR BLD MANUAL: 4 % (ref 0–5)
NEUTS SEG NFR BLD MANUAL: 86 % (ref 42–76)
PLATELET # BLD AUTO: 409 K/UL (ref 150–450)
PLATELET BLD QL SMEAR: ADEQUATE
POTASSIUM SERPL-SCNC: 3.6 MMOL/L (ref 3.5–5.1)
RBC # BLD AUTO: 4.27 MIL/UL (ref 4–5.2)
SODIUM SERPL-SCNC: 146 MMOL/L (ref 136–145)
WBC NRBC COR # BLD AUTO: 31.3 K/UL (ref 4.3–11)

## 2023-09-16 PROCEDURE — 05HA33Z INSERTION OF INFUSION DEVICE INTO LEFT BRACHIAL VEIN, PERCUTANEOUS APPROACH: ICD-10-PCS | Performed by: NURSE PRACTITIONER

## 2023-09-16 RX ADMIN — HYDROCORTISONE SODIUM SUCCINATE SCH MG: 100 INJECTION, POWDER, FOR SOLUTION INTRAMUSCULAR; INTRAVASCULAR at 17:39

## 2023-09-16 RX ADMIN — PANTOPRAZOLE SODIUM SCH MG: 40 GRANULE, DELAYED RELEASE ORAL at 21:06

## 2023-09-16 RX ADMIN — Medication SCH MG: at 07:32

## 2023-09-16 RX ADMIN — MEROPENEM SCH MLS/HR: 500 INJECTION INTRAVENOUS at 04:20

## 2023-09-16 RX ADMIN — MEROPENEM SCH MLS/HR: 500 INJECTION INTRAVENOUS at 20:04

## 2023-09-16 RX ADMIN — TRAMADOL HYDROCHLORIDE SCH MG: 50 TABLET, FILM COATED ORAL at 17:39

## 2023-09-16 RX ADMIN — ACETYLCYSTEINE SCH MG: 100 INHALANT RESPIRATORY (INHALATION) at 14:26

## 2023-09-16 RX ADMIN — ALBUTEROL SULFATE SCH MG: 1.25 SOLUTION RESPIRATORY (INHALATION) at 19:37

## 2023-09-16 RX ADMIN — Medication SCH MG: at 10:47

## 2023-09-16 RX ADMIN — Medication SCH MG: at 19:37

## 2023-09-16 RX ADMIN — ENOXAPARIN SODIUM SCH MG: 40 INJECTION SUBCUTANEOUS at 10:52

## 2023-09-16 RX ADMIN — ASPIRIN 81 MG SCH MG: 81 TABLET ORAL at 17:39

## 2023-09-16 RX ADMIN — ALBUTEROL SULFATE SCH MG: 1.25 SOLUTION RESPIRATORY (INHALATION) at 07:33

## 2023-09-16 RX ADMIN — DEXTROSE MONOHYDRATE SCH MLS/HR: 50 INJECTION, SOLUTION INTRAVENOUS at 23:23

## 2023-09-16 RX ADMIN — ACETYLCYSTEINE SCH MG: 100 INHALANT RESPIRATORY (INHALATION) at 07:33

## 2023-09-16 RX ADMIN — DOCUSATE SODIUM SCH MG: 50 LIQUID ORAL at 10:45

## 2023-09-16 RX ADMIN — METRONIDAZOLE SCH MG: 500 TABLET ORAL at 21:06

## 2023-09-16 RX ADMIN — CHLORHEXIDINE GLUCONATE 0.12% ORAL RINSE SCH ML: 1.2 LIQUID ORAL at 10:46

## 2023-09-16 RX ADMIN — TRAMADOL HYDROCHLORIDE SCH MG: 50 TABLET, FILM COATED ORAL at 10:48

## 2023-09-16 RX ADMIN — QUETIAPINE SCH MG: 25 TABLET, FILM COATED ORAL at 21:06

## 2023-09-16 RX ADMIN — Medication SCH MG: at 13:30

## 2023-09-16 RX ADMIN — FLUCONAZOLE SCH MG: 100 TABLET ORAL at 17:40

## 2023-09-16 RX ADMIN — CALCIUM SCH MG: 500 TABLET ORAL at 17:39

## 2023-09-16 RX ADMIN — THERA TABS SCH UDTAB: TAB at 10:46

## 2023-09-16 RX ADMIN — PANTOPRAZOLE SODIUM SCH MG: 40 GRANULE, DELAYED RELEASE ORAL at 10:47

## 2023-09-16 RX ADMIN — Medication SCH MG: at 01:28

## 2023-09-16 RX ADMIN — CHLORHEXIDINE GLUCONATE 0.12% ORAL RINSE SCH ML: 1.2 LIQUID ORAL at 17:38

## 2023-09-16 RX ADMIN — ALBUTEROL SULFATE SCH MG: 1.25 SOLUTION RESPIRATORY (INHALATION) at 01:28

## 2023-09-16 RX ADMIN — ACETAMINOPHEN PRN MG: 650 SUPPOSITORY RECTAL at 15:08

## 2023-09-16 RX ADMIN — ALBUTEROL SULFATE SCH MG: 1.25 SOLUTION RESPIRATORY (INHALATION) at 13:30

## 2023-09-16 RX ADMIN — Medication SCH APPLIC: at 10:51

## 2023-09-16 RX ADMIN — OXYCODONE HYDROCHLORIDE AND ACETAMINOPHEN SCH MG: 500 TABLET ORAL at 10:47

## 2023-09-16 RX ADMIN — ACETYLCYSTEINE SCH MG: 100 INHALANT RESPIRATORY (INHALATION) at 23:30

## 2023-09-17 VITALS — OXYGEN SATURATION: 96 % | SYSTOLIC BLOOD PRESSURE: 94 MMHG | DIASTOLIC BLOOD PRESSURE: 81 MMHG

## 2023-09-17 VITALS — OXYGEN SATURATION: 99 % | DIASTOLIC BLOOD PRESSURE: 74 MMHG | SYSTOLIC BLOOD PRESSURE: 100 MMHG | TEMPERATURE: 98.8 F

## 2023-09-17 VITALS — SYSTOLIC BLOOD PRESSURE: 113 MMHG | OXYGEN SATURATION: 96 % | DIASTOLIC BLOOD PRESSURE: 66 MMHG

## 2023-09-17 VITALS — OXYGEN SATURATION: 95 % | DIASTOLIC BLOOD PRESSURE: 61 MMHG | TEMPERATURE: 97.7 F | SYSTOLIC BLOOD PRESSURE: 123 MMHG

## 2023-09-17 VITALS — DIASTOLIC BLOOD PRESSURE: 71 MMHG | SYSTOLIC BLOOD PRESSURE: 158 MMHG | OXYGEN SATURATION: 100 %

## 2023-09-17 VITALS — SYSTOLIC BLOOD PRESSURE: 134 MMHG | DIASTOLIC BLOOD PRESSURE: 61 MMHG | TEMPERATURE: 97.8 F | OXYGEN SATURATION: 97 %

## 2023-09-17 VITALS — SYSTOLIC BLOOD PRESSURE: 119 MMHG | TEMPERATURE: 97.8 F | DIASTOLIC BLOOD PRESSURE: 63 MMHG | OXYGEN SATURATION: 96 %

## 2023-09-17 VITALS — TEMPERATURE: 97.7 F | SYSTOLIC BLOOD PRESSURE: 158 MMHG | DIASTOLIC BLOOD PRESSURE: 71 MMHG | OXYGEN SATURATION: 100 %

## 2023-09-17 VITALS — OXYGEN SATURATION: 100 % | DIASTOLIC BLOOD PRESSURE: 58 MMHG | SYSTOLIC BLOOD PRESSURE: 125 MMHG

## 2023-09-17 VITALS — SYSTOLIC BLOOD PRESSURE: 101 MMHG | DIASTOLIC BLOOD PRESSURE: 58 MMHG | OXYGEN SATURATION: 96 %

## 2023-09-17 VITALS — OXYGEN SATURATION: 98 %

## 2023-09-17 VITALS — SYSTOLIC BLOOD PRESSURE: 108 MMHG | OXYGEN SATURATION: 93 % | DIASTOLIC BLOOD PRESSURE: 65 MMHG

## 2023-09-17 VITALS — OXYGEN SATURATION: 90 %

## 2023-09-17 VITALS — SYSTOLIC BLOOD PRESSURE: 106 MMHG | DIASTOLIC BLOOD PRESSURE: 53 MMHG

## 2023-09-17 VITALS — SYSTOLIC BLOOD PRESSURE: 128 MMHG | OXYGEN SATURATION: 97 % | DIASTOLIC BLOOD PRESSURE: 64 MMHG

## 2023-09-17 VITALS — OXYGEN SATURATION: 96 %

## 2023-09-17 VITALS — SYSTOLIC BLOOD PRESSURE: 97 MMHG | DIASTOLIC BLOOD PRESSURE: 57 MMHG | OXYGEN SATURATION: 97 %

## 2023-09-17 VITALS — TEMPERATURE: 98.8 F | SYSTOLIC BLOOD PRESSURE: 129 MMHG | OXYGEN SATURATION: 100 % | DIASTOLIC BLOOD PRESSURE: 81 MMHG

## 2023-09-17 VITALS — OXYGEN SATURATION: 96 % | SYSTOLIC BLOOD PRESSURE: 139 MMHG | DIASTOLIC BLOOD PRESSURE: 65 MMHG

## 2023-09-17 VITALS — OXYGEN SATURATION: 97 %

## 2023-09-17 VITALS — DIASTOLIC BLOOD PRESSURE: 74 MMHG | SYSTOLIC BLOOD PRESSURE: 141 MMHG | OXYGEN SATURATION: 100 %

## 2023-09-17 VITALS — DIASTOLIC BLOOD PRESSURE: 55 MMHG | SYSTOLIC BLOOD PRESSURE: 137 MMHG | OXYGEN SATURATION: 100 %

## 2023-09-17 VITALS — OXYGEN SATURATION: 100 %

## 2023-09-17 LAB
BASOPHILS # BLD AUTO: 0 K/UL (ref 0–0.2)
BASOPHILS NFR BLD AUTO: 0 % (ref 0–2)
BUN SERPL-MCNC: 35 MG/DL (ref 7–18)
CALCIUM SERPL-MCNC: 9.7 MG/DL (ref 8.5–10.1)
CHLORIDE SERPL-SCNC: 113 MMOL/L (ref 98–107)
CO2 SERPL-SCNC: 25 MMOL/L (ref 21–32)
CREAT SERPL-MCNC: 0.8 MG/DL (ref 0.6–1.3)
EOSINOPHIL # BLD AUTO: 0 K/UL (ref 0–0.7)
EOSINOPHIL NFR BLD AUTO: 0 % (ref 0–6)
ERYTHROCYTE [DISTWIDTH] IN BLOOD BY AUTOMATED COUNT: 19.2 % (ref 11.5–15)
GLUCOSE SERPL-MCNC: 153 MG/DL (ref 74–106)
HCT VFR BLD AUTO: 31 % (ref 33–45)
HGB BLD-MCNC: 9.5 G/DL (ref 11.5–14.8)
LYMPHOCYTES NFR BLD AUTO: 0.6 K/UL (ref 0.8–4.8)
LYMPHOCYTES NFR BLD AUTO: 2.6 % (ref 20–44)
MCH RBC QN AUTO: 25 PG (ref 26–33)
MCHC RBC AUTO-ENTMCNC: 31 G/DL (ref 31–36)
MCV RBC AUTO: 80 FL (ref 82–100)
MONOCYTES NFR BLD AUTO: 0.5 K/UL (ref 0.1–1.3)
MONOCYTES NFR BLD AUTO: 1.9 % (ref 2–12)
NEUTROPHILS # BLD AUTO: 22.7 K/UL (ref 1.8–8.9)
NEUTROPHILS NFR BLD AUTO: 95.5 % (ref 43–81)
PLATELET # BLD AUTO: 347 K/UL (ref 150–450)
POTASSIUM SERPL-SCNC: 3 MMOL/L (ref 3.5–5.1)
RBC # BLD AUTO: 3.81 MIL/UL (ref 4–5.2)
SODIUM SERPL-SCNC: 148 MMOL/L (ref 136–145)
WBC NRBC COR # BLD AUTO: 23.8 K/UL (ref 4.3–11)

## 2023-09-17 RX ADMIN — PANTOPRAZOLE SODIUM SCH MG: 40 GRANULE, DELAYED RELEASE ORAL at 08:07

## 2023-09-17 RX ADMIN — Medication SCH MG: at 07:55

## 2023-09-17 RX ADMIN — Medication SCH APPLIC: at 08:11

## 2023-09-17 RX ADMIN — HYDROCORTISONE SODIUM SUCCINATE SCH MG: 100 INJECTION, POWDER, FOR SOLUTION INTRAMUSCULAR; INTRAVASCULAR at 09:09

## 2023-09-17 RX ADMIN — CHLORHEXIDINE GLUCONATE 0.12% ORAL RINSE SCH ML: 1.2 LIQUID ORAL at 16:27

## 2023-09-17 RX ADMIN — DOCUSATE SODIUM SCH MG: 50 LIQUID ORAL at 08:06

## 2023-09-17 RX ADMIN — SODIUM CHLORIDE PRN MLS/HR: 9 INJECTION, SOLUTION INTRAVENOUS at 08:13

## 2023-09-17 RX ADMIN — Medication SCH MG: at 20:07

## 2023-09-17 RX ADMIN — ASPIRIN 81 MG SCH MG: 81 TABLET ORAL at 17:08

## 2023-09-17 RX ADMIN — FLUCONAZOLE SCH MG: 100 TABLET ORAL at 16:32

## 2023-09-17 RX ADMIN — OXYCODONE HYDROCHLORIDE AND ACETAMINOPHEN SCH MG: 500 TABLET ORAL at 08:07

## 2023-09-17 RX ADMIN — Medication SCH MG: at 08:07

## 2023-09-17 RX ADMIN — ALBUTEROL SULFATE SCH MG: 1.25 SOLUTION RESPIRATORY (INHALATION) at 07:55

## 2023-09-17 RX ADMIN — METRONIDAZOLE SCH MG: 500 TABLET ORAL at 12:01

## 2023-09-17 RX ADMIN — HYDROCORTISONE SODIUM SUCCINATE SCH MG: 100 INJECTION, POWDER, FOR SOLUTION INTRAMUSCULAR; INTRAVASCULAR at 17:08

## 2023-09-17 RX ADMIN — PANTOPRAZOLE SODIUM SCH MG: 40 GRANULE, DELAYED RELEASE ORAL at 20:30

## 2023-09-17 RX ADMIN — TRAMADOL HYDROCHLORIDE SCH MG: 50 TABLET, FILM COATED ORAL at 16:27

## 2023-09-17 RX ADMIN — ALBUTEROL SULFATE SCH MG: 1.25 SOLUTION RESPIRATORY (INHALATION) at 20:07

## 2023-09-17 RX ADMIN — THERA TABS SCH UDTAB: TAB at 08:07

## 2023-09-17 RX ADMIN — DEXTROSE MONOHYDRATE SCH MLS/HR: 50 INJECTION, SOLUTION INTRAVENOUS at 20:30

## 2023-09-17 RX ADMIN — METRONIDAZOLE SCH MG: 500 TABLET ORAL at 05:02

## 2023-09-17 RX ADMIN — CHLORHEXIDINE GLUCONATE 0.12% ORAL RINSE SCH ML: 1.2 LIQUID ORAL at 08:06

## 2023-09-17 RX ADMIN — ACETYLCYSTEINE SCH MG: 100 INHALANT RESPIRATORY (INHALATION) at 07:55

## 2023-09-17 RX ADMIN — MEROPENEM SCH MLS/HR: 500 INJECTION INTRAVENOUS at 08:07

## 2023-09-17 RX ADMIN — Medication SCH MG: at 13:30

## 2023-09-17 RX ADMIN — ENOXAPARIN SODIUM SCH MG: 40 INJECTION SUBCUTANEOUS at 08:11

## 2023-09-17 RX ADMIN — HYDROCORTISONE SODIUM SUCCINATE SCH MG: 100 INJECTION, POWDER, FOR SOLUTION INTRAMUSCULAR; INTRAVASCULAR at 17:15

## 2023-09-17 RX ADMIN — TRAMADOL HYDROCHLORIDE SCH MG: 50 TABLET, FILM COATED ORAL at 08:06

## 2023-09-17 RX ADMIN — ALBUTEROL SULFATE SCH MG: 1.25 SOLUTION RESPIRATORY (INHALATION) at 15:07

## 2023-09-17 RX ADMIN — ALBUTEROL SULFATE SCH MG: 1.25 SOLUTION RESPIRATORY (INHALATION) at 13:30

## 2023-09-17 RX ADMIN — MEROPENEM SCH MLS/HR: 500 INJECTION INTRAVENOUS at 20:30

## 2023-09-17 RX ADMIN — Medication SCH MG: at 15:07

## 2023-09-17 RX ADMIN — METRONIDAZOLE SCH MG: 500 TABLET ORAL at 20:30

## 2023-09-17 RX ADMIN — Medication SCH MG: at 01:54

## 2023-09-17 RX ADMIN — CALCIUM SCH MG: 500 TABLET ORAL at 17:38

## 2023-09-17 RX ADMIN — ALBUTEROL SULFATE SCH MG: 1.25 SOLUTION RESPIRATORY (INHALATION) at 01:54

## 2023-09-17 RX ADMIN — QUETIAPINE SCH MG: 25 TABLET, FILM COATED ORAL at 22:46

## 2023-09-17 RX ADMIN — DEXTROSE AND SODIUM CHLORIDE PRN MLS/HR: 5; 450 INJECTION, SOLUTION INTRAVENOUS at 05:00

## 2023-09-17 RX ADMIN — ACETYLCYSTEINE SCH MG: 100 INHALANT RESPIRATORY (INHALATION) at 15:06

## 2023-09-17 RX ADMIN — HYDROCORTISONE SODIUM SUCCINATE SCH MG: 100 INJECTION, POWDER, FOR SOLUTION INTRAMUSCULAR; INTRAVASCULAR at 02:09

## 2023-09-18 VITALS — TEMPERATURE: 98.1 F | SYSTOLIC BLOOD PRESSURE: 138 MMHG | OXYGEN SATURATION: 100 % | DIASTOLIC BLOOD PRESSURE: 74 MMHG

## 2023-09-18 VITALS — TEMPERATURE: 97.5 F | DIASTOLIC BLOOD PRESSURE: 57 MMHG | SYSTOLIC BLOOD PRESSURE: 120 MMHG | OXYGEN SATURATION: 100 %

## 2023-09-18 VITALS — DIASTOLIC BLOOD PRESSURE: 82 MMHG | SYSTOLIC BLOOD PRESSURE: 139 MMHG | OXYGEN SATURATION: 100 % | TEMPERATURE: 97.4 F

## 2023-09-18 VITALS — DIASTOLIC BLOOD PRESSURE: 66 MMHG | OXYGEN SATURATION: 100 % | TEMPERATURE: 97.9 F | SYSTOLIC BLOOD PRESSURE: 130 MMHG

## 2023-09-18 VITALS — OXYGEN SATURATION: 99 %

## 2023-09-18 VITALS — SYSTOLIC BLOOD PRESSURE: 152 MMHG | DIASTOLIC BLOOD PRESSURE: 71 MMHG | OXYGEN SATURATION: 100 % | TEMPERATURE: 97 F

## 2023-09-18 VITALS — OXYGEN SATURATION: 100 %

## 2023-09-18 VITALS — DIASTOLIC BLOOD PRESSURE: 61 MMHG | SYSTOLIC BLOOD PRESSURE: 131 MMHG | OXYGEN SATURATION: 100 % | TEMPERATURE: 98.2 F

## 2023-09-18 VITALS — OXYGEN SATURATION: 95 %

## 2023-09-18 VITALS — OXYGEN SATURATION: 94 %

## 2023-09-18 LAB
BASOPHILS # BLD AUTO: 0 K/UL (ref 0–0.2)
BASOPHILS NFR BLD AUTO: 0 % (ref 0–2)
BUN SERPL-MCNC: 31 MG/DL (ref 7–18)
BUN SERPL-MCNC: 31 MG/DL (ref 7–18)
CALCIUM SERPL-MCNC: 8.9 MG/DL (ref 8.5–10.1)
CALCIUM SERPL-MCNC: 9.2 MG/DL (ref 8.5–10.1)
CHLORIDE SERPL-SCNC: 113 MMOL/L (ref 98–107)
CHLORIDE SERPL-SCNC: 116 MMOL/L (ref 98–107)
CO2 SERPL-SCNC: 23 MMOL/L (ref 21–32)
CO2 SERPL-SCNC: 26 MMOL/L (ref 21–32)
CREAT SERPL-MCNC: 0.6 MG/DL (ref 0.6–1.3)
CREAT SERPL-MCNC: 0.6 MG/DL (ref 0.6–1.3)
EOSINOPHIL # BLD AUTO: 0 K/UL (ref 0–0.7)
EOSINOPHIL NFR BLD AUTO: 0 % (ref 0–6)
ERYTHROCYTE [DISTWIDTH] IN BLOOD BY AUTOMATED COUNT: 19.3 % (ref 11.5–15)
GLUCOSE SERPL-MCNC: 143 MG/DL (ref 74–106)
GLUCOSE SERPL-MCNC: 261 MG/DL (ref 74–106)
HCT VFR BLD AUTO: 30 % (ref 33–45)
HGB BLD-MCNC: 9.3 G/DL (ref 11.5–14.8)
LYMPHOCYTES NFR BLD AUTO: 0.4 K/UL (ref 0.8–4.8)
LYMPHOCYTES NFR BLD AUTO: 2.6 % (ref 20–44)
MCH RBC QN AUTO: 25 PG (ref 26–33)
MCHC RBC AUTO-ENTMCNC: 32 G/DL (ref 31–36)
MCV RBC AUTO: 80 FL (ref 82–100)
MONOCYTES NFR BLD AUTO: 0.3 K/UL (ref 0.1–1.3)
MONOCYTES NFR BLD AUTO: 2.1 % (ref 2–12)
NEUTROPHILS # BLD AUTO: 15.4 K/UL (ref 1.8–8.9)
NEUTROPHILS NFR BLD AUTO: 95.3 % (ref 43–81)
PLATELET # BLD AUTO: 326 K/UL (ref 150–450)
POTASSIUM SERPL-SCNC: 2.7 MMOL/L (ref 3.5–5.1)
POTASSIUM SERPL-SCNC: 3.5 MMOL/L (ref 3.5–5.1)
RBC # BLD AUTO: 3.71 MIL/UL (ref 4–5.2)
SODIUM SERPL-SCNC: 148 MMOL/L (ref 136–145)
SODIUM SERPL-SCNC: 148 MMOL/L (ref 136–145)
WBC NRBC COR # BLD AUTO: 16.2 K/UL (ref 4.3–11)

## 2023-09-18 RX ADMIN — ASPIRIN 81 MG SCH MG: 81 TABLET ORAL at 17:12

## 2023-09-18 RX ADMIN — FLUCONAZOLE SCH MG: 100 TABLET ORAL at 15:39

## 2023-09-18 RX ADMIN — ALBUTEROL SULFATE SCH MG: 1.25 SOLUTION RESPIRATORY (INHALATION) at 19:47

## 2023-09-18 RX ADMIN — CHLORHEXIDINE GLUCONATE 0.12% ORAL RINSE SCH ML: 1.2 LIQUID ORAL at 16:07

## 2023-09-18 RX ADMIN — PANTOPRAZOLE SODIUM SCH MG: 40 GRANULE, DELAYED RELEASE ORAL at 21:14

## 2023-09-18 RX ADMIN — ALBUTEROL SULFATE SCH MG: 1.25 SOLUTION RESPIRATORY (INHALATION) at 13:31

## 2023-09-18 RX ADMIN — METRONIDAZOLE SCH MG: 500 TABLET ORAL at 05:26

## 2023-09-18 RX ADMIN — Medication SCH MG: at 02:02

## 2023-09-18 RX ADMIN — Medication SCH MG: at 08:19

## 2023-09-18 RX ADMIN — ACETYLCYSTEINE SCH MG: 100 INHALANT RESPIRATORY (INHALATION) at 23:30

## 2023-09-18 RX ADMIN — Medication SCH MG: at 13:31

## 2023-09-18 RX ADMIN — HYDROCORTISONE SODIUM SUCCINATE SCH MG: 100 INJECTION, POWDER, FOR SOLUTION INTRAMUSCULAR; INTRAVASCULAR at 17:12

## 2023-09-18 RX ADMIN — Medication SCH APPLIC: at 08:21

## 2023-09-18 RX ADMIN — METRONIDAZOLE SCH MG: 500 TABLET ORAL at 21:14

## 2023-09-18 RX ADMIN — PANTOPRAZOLE SODIUM SCH MG: 40 GRANULE, DELAYED RELEASE ORAL at 08:19

## 2023-09-18 RX ADMIN — THERA TABS SCH UDTAB: TAB at 08:19

## 2023-09-18 RX ADMIN — METRONIDAZOLE SCH MG: 500 TABLET ORAL at 12:02

## 2023-09-18 RX ADMIN — DEXTROSE AND SODIUM CHLORIDE PRN MLS/HR: 5; 450 INJECTION, SOLUTION INTRAVENOUS at 13:57

## 2023-09-18 RX ADMIN — DOCUSATE SODIUM SCH MG: 50 LIQUID ORAL at 08:20

## 2023-09-18 RX ADMIN — Medication SCH MG: at 19:47

## 2023-09-18 RX ADMIN — MEROPENEM SCH MLS/HR: 500 INJECTION INTRAVENOUS at 08:18

## 2023-09-18 RX ADMIN — TRAMADOL HYDROCHLORIDE SCH MG: 50 TABLET, FILM COATED ORAL at 16:07

## 2023-09-18 RX ADMIN — MEROPENEM SCH MLS/HR: 500 INJECTION INTRAVENOUS at 20:09

## 2023-09-18 RX ADMIN — SODIUM CHLORIDE PRN MLS/HR: 9 INJECTION, SOLUTION INTRAVENOUS at 13:56

## 2023-09-18 RX ADMIN — ACETYLCYSTEINE SCH MG: 100 INHALANT RESPIRATORY (INHALATION) at 13:31

## 2023-09-18 RX ADMIN — TRAMADOL HYDROCHLORIDE SCH MG: 50 TABLET, FILM COATED ORAL at 08:20

## 2023-09-18 RX ADMIN — QUETIAPINE SCH MG: 25 TABLET, FILM COATED ORAL at 21:14

## 2023-09-18 RX ADMIN — ALBUTEROL SULFATE SCH MG: 1.25 SOLUTION RESPIRATORY (INHALATION) at 08:00

## 2023-09-18 RX ADMIN — HYDROCORTISONE SODIUM SUCCINATE SCH MG: 100 INJECTION, POWDER, FOR SOLUTION INTRAMUSCULAR; INTRAVASCULAR at 09:21

## 2023-09-18 RX ADMIN — HYDROCORTISONE SODIUM SUCCINATE SCH MG: 100 INJECTION, POWDER, FOR SOLUTION INTRAMUSCULAR; INTRAVASCULAR at 02:42

## 2023-09-18 RX ADMIN — ACETYLCYSTEINE SCH MG: 100 INHALANT RESPIRATORY (INHALATION) at 00:12

## 2023-09-18 RX ADMIN — OXYCODONE HYDROCHLORIDE AND ACETAMINOPHEN SCH MG: 500 TABLET ORAL at 08:19

## 2023-09-18 RX ADMIN — ACETYLCYSTEINE SCH MG: 100 INHALANT RESPIRATORY (INHALATION) at 08:00

## 2023-09-18 RX ADMIN — CHLORHEXIDINE GLUCONATE 0.12% ORAL RINSE SCH ML: 1.2 LIQUID ORAL at 08:19

## 2023-09-18 RX ADMIN — Medication SCH MG: at 08:00

## 2023-09-18 RX ADMIN — ENOXAPARIN SODIUM SCH MG: 40 INJECTION SUBCUTANEOUS at 08:21

## 2023-09-18 RX ADMIN — ALBUTEROL SULFATE SCH MG: 1.25 SOLUTION RESPIRATORY (INHALATION) at 02:02

## 2023-09-18 RX ADMIN — CALCIUM SCH MG: 500 TABLET ORAL at 17:12

## 2023-09-19 VITALS — TEMPERATURE: 97.5 F | DIASTOLIC BLOOD PRESSURE: 87 MMHG | OXYGEN SATURATION: 98 % | SYSTOLIC BLOOD PRESSURE: 136 MMHG

## 2023-09-19 VITALS — OXYGEN SATURATION: 100 % | SYSTOLIC BLOOD PRESSURE: 127 MMHG | DIASTOLIC BLOOD PRESSURE: 80 MMHG | TEMPERATURE: 97.8 F

## 2023-09-19 VITALS — OXYGEN SATURATION: 99 %

## 2023-09-19 LAB
BUN SERPL-MCNC: 27 MG/DL (ref 7–18)
CALCIUM SERPL-MCNC: 9.4 MG/DL (ref 8.5–10.1)
CHLORIDE SERPL-SCNC: 111 MMOL/L (ref 98–107)
CO2 SERPL-SCNC: 26 MMOL/L (ref 21–32)
CREAT SERPL-MCNC: 0.5 MG/DL (ref 0.6–1.3)
GLUCOSE SERPL-MCNC: 149 MG/DL (ref 74–106)
POTASSIUM SERPL-SCNC: 2.4 MMOL/L (ref 3.5–5.1)
SODIUM SERPL-SCNC: 147 MMOL/L (ref 136–145)

## 2023-09-19 PROCEDURE — 0KBG0ZZ EXCISION OF LEFT TRUNK MUSCLE, OPEN APPROACH: ICD-10-PCS

## 2023-09-19 PROCEDURE — 0KBS0ZZ EXCISION OF RIGHT LOWER LEG MUSCLE, OPEN APPROACH: ICD-10-PCS

## 2023-09-19 RX ADMIN — Medication SCH MG: at 13:00

## 2023-09-19 RX ADMIN — MEROPENEM SCH MLS/HR: 500 INJECTION INTRAVENOUS at 20:06

## 2023-09-19 RX ADMIN — Medication SCH MG: at 09:00

## 2023-09-19 RX ADMIN — ALBUTEROL SULFATE SCH MG: 1.25 SOLUTION RESPIRATORY (INHALATION) at 19:48

## 2023-09-19 RX ADMIN — Medication SCH MG: at 01:53

## 2023-09-19 RX ADMIN — DOCUSATE SODIUM SCH MG: 50 LIQUID ORAL at 08:58

## 2023-09-19 RX ADMIN — METRONIDAZOLE SCH MG: 500 TABLET ORAL at 21:03

## 2023-09-19 RX ADMIN — CHLORHEXIDINE GLUCONATE 0.12% ORAL RINSE SCH ML: 1.2 LIQUID ORAL at 08:58

## 2023-09-19 RX ADMIN — HYDROCORTISONE SODIUM SUCCINATE SCH MG: 100 INJECTION, POWDER, FOR SOLUTION INTRAMUSCULAR; INTRAVASCULAR at 03:11

## 2023-09-19 RX ADMIN — PANTOPRAZOLE SODIUM SCH MG: 40 GRANULE, DELAYED RELEASE ORAL at 21:04

## 2023-09-19 RX ADMIN — CALCIUM SCH MG: 500 TABLET ORAL at 17:04

## 2023-09-19 RX ADMIN — MEROPENEM SCH MLS/HR: 500 INJECTION INTRAVENOUS at 07:49

## 2023-09-19 RX ADMIN — METRONIDAZOLE SCH MG: 500 TABLET ORAL at 04:47

## 2023-09-19 RX ADMIN — PANTOPRAZOLE SODIUM SCH MG: 40 GRANULE, DELAYED RELEASE ORAL at 09:00

## 2023-09-19 RX ADMIN — CHLORHEXIDINE GLUCONATE 0.12% ORAL RINSE SCH ML: 1.2 LIQUID ORAL at 16:16

## 2023-09-19 RX ADMIN — TRAMADOL HYDROCHLORIDE SCH MG: 50 TABLET, FILM COATED ORAL at 08:59

## 2023-09-19 RX ADMIN — ASPIRIN 81 MG SCH MG: 81 TABLET ORAL at 17:03

## 2023-09-19 RX ADMIN — TRAMADOL HYDROCHLORIDE SCH MG: 50 TABLET, FILM COATED ORAL at 16:16

## 2023-09-19 RX ADMIN — Medication SCH MG: at 10:16

## 2023-09-19 RX ADMIN — ENOXAPARIN SODIUM SCH MG: 40 INJECTION SUBCUTANEOUS at 08:59

## 2023-09-19 RX ADMIN — Medication SCH MG: at 19:48

## 2023-09-19 RX ADMIN — ALBUTEROL SULFATE SCH MG: 1.25 SOLUTION RESPIRATORY (INHALATION) at 13:00

## 2023-09-19 RX ADMIN — HYDROCORTISONE SODIUM SUCCINATE SCH MG: 100 INJECTION, POWDER, FOR SOLUTION INTRAMUSCULAR; INTRAVASCULAR at 09:01

## 2023-09-19 RX ADMIN — POTASSIUM CHLORIDE SCH MEQ: 1500 TABLET, EXTENDED RELEASE ORAL at 11:05

## 2023-09-19 RX ADMIN — ACETYLCYSTEINE SCH MG: 100 INHALANT RESPIRATORY (INHALATION) at 10:16

## 2023-09-19 RX ADMIN — Medication SCH APPLIC: at 08:50

## 2023-09-19 RX ADMIN — HYDROCORTISONE SODIUM SUCCINATE SCH MG: 100 INJECTION, POWDER, FOR SOLUTION INTRAMUSCULAR; INTRAVASCULAR at 17:03

## 2023-09-19 RX ADMIN — DEXTROSE AND SODIUM CHLORIDE PRN MLS/HR: 5; 450 INJECTION, SOLUTION INTRAVENOUS at 23:14

## 2023-09-19 RX ADMIN — ACETYLCYSTEINE SCH MG: 100 INHALANT RESPIRATORY (INHALATION) at 23:21

## 2023-09-19 RX ADMIN — THERA TABS SCH UDTAB: TAB at 08:59

## 2023-09-19 RX ADMIN — QUETIAPINE SCH MG: 25 TABLET, FILM COATED ORAL at 21:04

## 2023-09-19 RX ADMIN — METRONIDAZOLE SCH MG: 500 TABLET ORAL at 12:33

## 2023-09-19 RX ADMIN — FLUCONAZOLE SCH MG: 100 TABLET ORAL at 16:16

## 2023-09-19 RX ADMIN — POTASSIUM CHLORIDE SCH MEQ: 1500 TABLET, EXTENDED RELEASE ORAL at 09:00

## 2023-09-19 RX ADMIN — POTASSIUM CHLORIDE SCH MEQ: 1500 TABLET, EXTENDED RELEASE ORAL at 12:33

## 2023-09-19 RX ADMIN — OXYCODONE HYDROCHLORIDE AND ACETAMINOPHEN SCH MG: 500 TABLET ORAL at 08:59

## 2023-09-19 RX ADMIN — ALBUTEROL SULFATE SCH MG: 1.25 SOLUTION RESPIRATORY (INHALATION) at 01:53

## 2023-09-19 RX ADMIN — ACETYLCYSTEINE SCH MG: 100 INHALANT RESPIRATORY (INHALATION) at 01:53

## 2023-09-19 RX ADMIN — ACETYLCYSTEINE SCH MG: 100 INHALANT RESPIRATORY (INHALATION) at 13:00

## 2023-09-19 RX ADMIN — ALBUTEROL SULFATE SCH MG: 1.25 SOLUTION RESPIRATORY (INHALATION) at 10:16

## 2023-09-20 VITALS — TEMPERATURE: 98.1 F | DIASTOLIC BLOOD PRESSURE: 79 MMHG | OXYGEN SATURATION: 100 % | SYSTOLIC BLOOD PRESSURE: 148 MMHG

## 2023-09-20 VITALS — TEMPERATURE: 98.1 F | SYSTOLIC BLOOD PRESSURE: 145 MMHG | DIASTOLIC BLOOD PRESSURE: 85 MMHG | OXYGEN SATURATION: 99 %

## 2023-09-20 VITALS — OXYGEN SATURATION: 95 %

## 2023-09-20 VITALS — OXYGEN SATURATION: 100 %

## 2023-09-20 VITALS — DIASTOLIC BLOOD PRESSURE: 83 MMHG | TEMPERATURE: 97.9 F | SYSTOLIC BLOOD PRESSURE: 142 MMHG | OXYGEN SATURATION: 98 %

## 2023-09-20 VITALS — OXYGEN SATURATION: 97 %

## 2023-09-20 VITALS — OXYGEN SATURATION: 98 %

## 2023-09-20 VITALS — OXYGEN SATURATION: 99 %

## 2023-09-20 LAB
BASOPHILS # BLD AUTO: 0 K/UL (ref 0–0.2)
BASOPHILS NFR BLD AUTO: 0.1 % (ref 0–2)
BUN SERPL-MCNC: 27 MG/DL (ref 7–18)
CALCIUM SERPL-MCNC: 9.7 MG/DL (ref 8.5–10.1)
CHLORIDE SERPL-SCNC: 113 MMOL/L (ref 98–107)
CO2 SERPL-SCNC: 25 MMOL/L (ref 21–32)
CREAT SERPL-MCNC: 0.5 MG/DL (ref 0.6–1.3)
EOSINOPHIL # BLD AUTO: 0 K/UL (ref 0–0.7)
EOSINOPHIL NFR BLD AUTO: 0 % (ref 0–6)
ERYTHROCYTE [DISTWIDTH] IN BLOOD BY AUTOMATED COUNT: 19.5 % (ref 11.5–15)
GLUCOSE SERPL-MCNC: 156 MG/DL (ref 74–106)
HCT VFR BLD AUTO: 31 % (ref 33–45)
HGB BLD-MCNC: 9.8 G/DL (ref 11.5–14.8)
LYMPHOCYTES NFR BLD AUTO: 0.3 K/UL (ref 0.8–4.8)
LYMPHOCYTES NFR BLD AUTO: 2.4 % (ref 20–44)
MCH RBC QN AUTO: 25 PG (ref 26–33)
MCHC RBC AUTO-ENTMCNC: 31 G/DL (ref 31–36)
MCV RBC AUTO: 81 FL (ref 82–100)
MONOCYTES NFR BLD AUTO: 0.4 K/UL (ref 0.1–1.3)
MONOCYTES NFR BLD AUTO: 3.3 % (ref 2–12)
NEUTROPHILS # BLD AUTO: 12.5 K/UL (ref 1.8–8.9)
NEUTROPHILS NFR BLD AUTO: 94.2 % (ref 43–81)
PLATELET # BLD AUTO: 418 K/UL (ref 150–450)
POTASSIUM SERPL-SCNC: 3.5 MMOL/L (ref 3.5–5.1)
RBC # BLD AUTO: 3.89 MIL/UL (ref 4–5.2)
SODIUM SERPL-SCNC: 147 MMOL/L (ref 136–145)
WBC NRBC COR # BLD AUTO: 13.2 K/UL (ref 4.3–11)

## 2023-09-20 RX ADMIN — METRONIDAZOLE SCH MG: 500 TABLET ORAL at 12:25

## 2023-09-20 RX ADMIN — MEROPENEM SCH MLS/HR: 500 INJECTION INTRAVENOUS at 20:19

## 2023-09-20 RX ADMIN — Medication SCH MG: at 13:30

## 2023-09-20 RX ADMIN — TRAMADOL HYDROCHLORIDE SCH MG: 50 TABLET, FILM COATED ORAL at 16:40

## 2023-09-20 RX ADMIN — ALBUTEROL SULFATE SCH MG: 1.25 SOLUTION RESPIRATORY (INHALATION) at 13:30

## 2023-09-20 RX ADMIN — ENOXAPARIN SODIUM SCH MG: 40 INJECTION SUBCUTANEOUS at 09:00

## 2023-09-20 RX ADMIN — METRONIDAZOLE SCH MG: 500 TABLET ORAL at 04:27

## 2023-09-20 RX ADMIN — CHLORHEXIDINE GLUCONATE 0.12% ORAL RINSE SCH ML: 1.2 LIQUID ORAL at 16:40

## 2023-09-20 RX ADMIN — FLUCONAZOLE SCH MG: 100 TABLET ORAL at 16:40

## 2023-09-20 RX ADMIN — TRAMADOL HYDROCHLORIDE SCH MG: 50 TABLET, FILM COATED ORAL at 08:57

## 2023-09-20 RX ADMIN — HYDROCORTISONE SODIUM SUCCINATE SCH MG: 100 INJECTION, POWDER, FOR SOLUTION INTRAMUSCULAR; INTRAVASCULAR at 09:02

## 2023-09-20 RX ADMIN — ALBUTEROL SULFATE SCH MG: 1.25 SOLUTION RESPIRATORY (INHALATION) at 20:25

## 2023-09-20 RX ADMIN — ALBUTEROL SULFATE SCH MG: 1.25 SOLUTION RESPIRATORY (INHALATION) at 08:27

## 2023-09-20 RX ADMIN — PANTOPRAZOLE SODIUM SCH MG: 40 GRANULE, DELAYED RELEASE ORAL at 20:19

## 2023-09-20 RX ADMIN — QUETIAPINE SCH MG: 25 TABLET, FILM COATED ORAL at 21:50

## 2023-09-20 RX ADMIN — DOCUSATE SODIUM SCH MG: 50 LIQUID ORAL at 08:54

## 2023-09-20 RX ADMIN — ACETYLCYSTEINE SCH MG: 100 INHALANT RESPIRATORY (INHALATION) at 15:23

## 2023-09-20 RX ADMIN — OXYCODONE HYDROCHLORIDE AND ACETAMINOPHEN SCH MG: 500 TABLET ORAL at 08:56

## 2023-09-20 RX ADMIN — CHLORHEXIDINE GLUCONATE 0.12% ORAL RINSE SCH ML: 1.2 LIQUID ORAL at 08:53

## 2023-09-20 RX ADMIN — ACETYLCYSTEINE SCH MG: 100 INHALANT RESPIRATORY (INHALATION) at 08:27

## 2023-09-20 RX ADMIN — Medication SCH MG: at 08:58

## 2023-09-20 RX ADMIN — MEROPENEM SCH MLS/HR: 500 INJECTION INTRAVENOUS at 07:51

## 2023-09-20 RX ADMIN — ASPIRIN 81 MG SCH MG: 81 TABLET ORAL at 17:26

## 2023-09-20 RX ADMIN — METRONIDAZOLE SCH MG: 500 TABLET ORAL at 20:19

## 2023-09-20 RX ADMIN — ALBUTEROL SULFATE SCH MG: 1.25 SOLUTION RESPIRATORY (INHALATION) at 15:22

## 2023-09-20 RX ADMIN — Medication SCH MG: at 15:23

## 2023-09-20 RX ADMIN — Medication SCH MG: at 01:47

## 2023-09-20 RX ADMIN — CALCIUM SCH MG: 500 TABLET ORAL at 17:25

## 2023-09-20 RX ADMIN — Medication SCH MG: at 20:25

## 2023-09-20 RX ADMIN — THERA TABS SCH UDTAB: TAB at 08:54

## 2023-09-20 RX ADMIN — HYDROCORTISONE SODIUM SUCCINATE SCH MG: 100 INJECTION, POWDER, FOR SOLUTION INTRAMUSCULAR; INTRAVASCULAR at 17:25

## 2023-09-20 RX ADMIN — PANTOPRAZOLE SODIUM SCH MG: 40 GRANULE, DELAYED RELEASE ORAL at 08:56

## 2023-09-20 RX ADMIN — HYDROCORTISONE SODIUM SUCCINATE SCH MG: 100 INJECTION, POWDER, FOR SOLUTION INTRAMUSCULAR; INTRAVASCULAR at 02:02

## 2023-09-20 RX ADMIN — ALBUTEROL SULFATE SCH MG: 1.25 SOLUTION RESPIRATORY (INHALATION) at 01:47

## 2023-09-20 RX ADMIN — Medication SCH MG: at 08:27

## 2023-09-20 RX ADMIN — Medication SCH APPLIC: at 08:58

## 2023-09-21 VITALS — DIASTOLIC BLOOD PRESSURE: 85 MMHG | TEMPERATURE: 98.2 F | SYSTOLIC BLOOD PRESSURE: 138 MMHG | OXYGEN SATURATION: 96 %

## 2023-09-21 VITALS — OXYGEN SATURATION: 100 % | DIASTOLIC BLOOD PRESSURE: 79 MMHG | SYSTOLIC BLOOD PRESSURE: 158 MMHG | TEMPERATURE: 98.5 F

## 2023-09-21 VITALS — OXYGEN SATURATION: 96 % | SYSTOLIC BLOOD PRESSURE: 122 MMHG | DIASTOLIC BLOOD PRESSURE: 82 MMHG | TEMPERATURE: 98.2 F

## 2023-09-21 VITALS — SYSTOLIC BLOOD PRESSURE: 138 MMHG | OXYGEN SATURATION: 100 % | TEMPERATURE: 98.2 F | DIASTOLIC BLOOD PRESSURE: 85 MMHG

## 2023-09-21 VITALS — OXYGEN SATURATION: 96 %

## 2023-09-21 VITALS — OXYGEN SATURATION: 100 %

## 2023-09-21 VITALS — OXYGEN SATURATION: 98 %

## 2023-09-21 VITALS — SYSTOLIC BLOOD PRESSURE: 160 MMHG | OXYGEN SATURATION: 96 % | DIASTOLIC BLOOD PRESSURE: 90 MMHG | TEMPERATURE: 98.2 F

## 2023-09-21 VITALS — OXYGEN SATURATION: 97 %

## 2023-09-21 VITALS — OXYGEN SATURATION: 94 %

## 2023-09-21 LAB
BUN SERPL-MCNC: 30 MG/DL (ref 7–18)
CALCIUM SERPL-MCNC: 9.3 MG/DL (ref 8.5–10.1)
CHLORIDE SERPL-SCNC: 112 MMOL/L (ref 98–107)
CO2 SERPL-SCNC: 26 MMOL/L (ref 21–32)
CREAT SERPL-MCNC: 0.7 MG/DL (ref 0.6–1.3)
GLUCOSE SERPL-MCNC: 181 MG/DL (ref 74–106)
POTASSIUM SERPL-SCNC: 3 MMOL/L (ref 3.5–5.1)
SODIUM SERPL-SCNC: 148 MMOL/L (ref 136–145)

## 2023-09-21 RX ADMIN — THERA TABS SCH UDTAB: TAB at 09:39

## 2023-09-21 RX ADMIN — HYDROCORTISONE SODIUM SUCCINATE SCH MG: 100 INJECTION, POWDER, FOR SOLUTION INTRAMUSCULAR; INTRAVASCULAR at 11:03

## 2023-09-21 RX ADMIN — ACETYLCYSTEINE SCH MG: 100 INHALANT RESPIRATORY (INHALATION) at 00:38

## 2023-09-21 RX ADMIN — ALBUTEROL SULFATE SCH MG: 1.25 SOLUTION RESPIRATORY (INHALATION) at 07:32

## 2023-09-21 RX ADMIN — FLUCONAZOLE SCH MG: 100 TABLET ORAL at 17:21

## 2023-09-21 RX ADMIN — TRAMADOL HYDROCHLORIDE SCH MG: 50 TABLET, FILM COATED ORAL at 09:39

## 2023-09-21 RX ADMIN — ALBUTEROL SULFATE SCH MG: 1.25 SOLUTION RESPIRATORY (INHALATION) at 13:24

## 2023-09-21 RX ADMIN — Medication SCH MG: at 09:39

## 2023-09-21 RX ADMIN — METRONIDAZOLE SCH MG: 500 TABLET ORAL at 20:27

## 2023-09-21 RX ADMIN — ALBUTEROL SULFATE SCH MG: 1.25 SOLUTION RESPIRATORY (INHALATION) at 00:38

## 2023-09-21 RX ADMIN — Medication SCH MG: at 07:32

## 2023-09-21 RX ADMIN — METRONIDAZOLE SCH MG: 500 TABLET ORAL at 05:42

## 2023-09-21 RX ADMIN — HYDROCORTISONE SODIUM SUCCINATE SCH MG: 100 INJECTION, POWDER, FOR SOLUTION INTRAMUSCULAR; INTRAVASCULAR at 17:21

## 2023-09-21 RX ADMIN — DEXTROSE AND SODIUM CHLORIDE PRN MLS/HR: 5; 450 INJECTION, SOLUTION INTRAVENOUS at 17:03

## 2023-09-21 RX ADMIN — MEROPENEM SCH MLS/HR: 500 INJECTION INTRAVENOUS at 21:09

## 2023-09-21 RX ADMIN — Medication SCH MG: at 20:06

## 2023-09-21 RX ADMIN — ENOXAPARIN SODIUM SCH MG: 40 INJECTION SUBCUTANEOUS at 09:46

## 2023-09-21 RX ADMIN — CHLORHEXIDINE GLUCONATE 0.12% ORAL RINSE SCH ML: 1.2 LIQUID ORAL at 17:21

## 2023-09-21 RX ADMIN — HYDROCORTISONE SODIUM SUCCINATE SCH MG: 100 INJECTION, POWDER, FOR SOLUTION INTRAMUSCULAR; INTRAVASCULAR at 02:07

## 2023-09-21 RX ADMIN — Medication SCH MG: at 13:24

## 2023-09-21 RX ADMIN — TRAMADOL HYDROCHLORIDE SCH MG: 50 TABLET, FILM COATED ORAL at 17:21

## 2023-09-21 RX ADMIN — ALBUTEROL SULFATE SCH MG: 1.25 SOLUTION RESPIRATORY (INHALATION) at 20:06

## 2023-09-21 RX ADMIN — Medication SCH MG: at 20:03

## 2023-09-21 RX ADMIN — DOCUSATE SODIUM SCH MG: 50 LIQUID ORAL at 09:40

## 2023-09-21 RX ADMIN — METRONIDAZOLE SCH MG: 500 TABLET ORAL at 12:46

## 2023-09-21 RX ADMIN — ASPIRIN 81 MG SCH MG: 81 TABLET ORAL at 17:21

## 2023-09-21 RX ADMIN — MEROPENEM SCH MLS/HR: 500 INJECTION INTRAVENOUS at 09:36

## 2023-09-21 RX ADMIN — ALBUTEROL SULFATE SCH MG: 1.25 SOLUTION RESPIRATORY (INHALATION) at 20:03

## 2023-09-21 RX ADMIN — Medication SCH APPLIC: at 09:41

## 2023-09-21 RX ADMIN — CHLORHEXIDINE GLUCONATE 0.12% ORAL RINSE SCH ML: 1.2 LIQUID ORAL at 09:39

## 2023-09-21 RX ADMIN — PANTOPRAZOLE SODIUM SCH MG: 40 GRANULE, DELAYED RELEASE ORAL at 20:27

## 2023-09-21 RX ADMIN — QUETIAPINE SCH MG: 25 TABLET, FILM COATED ORAL at 21:34

## 2023-09-21 RX ADMIN — OXYCODONE HYDROCHLORIDE AND ACETAMINOPHEN SCH MG: 500 TABLET ORAL at 09:40

## 2023-09-21 RX ADMIN — PANTOPRAZOLE SODIUM SCH MG: 40 GRANULE, DELAYED RELEASE ORAL at 09:38

## 2023-09-21 RX ADMIN — ACETYLCYSTEINE SCH MG: 100 INHALANT RESPIRATORY (INHALATION) at 07:32

## 2023-09-21 RX ADMIN — ACETYLCYSTEINE SCH MG: 100 INHALANT RESPIRATORY (INHALATION) at 15:06

## 2023-09-21 RX ADMIN — CALCIUM SCH MG: 500 TABLET ORAL at 17:21

## 2023-09-21 RX ADMIN — Medication SCH MG: at 00:38

## 2023-09-22 VITALS — SYSTOLIC BLOOD PRESSURE: 148 MMHG | DIASTOLIC BLOOD PRESSURE: 75 MMHG | TEMPERATURE: 98.3 F | OXYGEN SATURATION: 98 %

## 2023-09-22 VITALS — SYSTOLIC BLOOD PRESSURE: 138 MMHG | DIASTOLIC BLOOD PRESSURE: 73 MMHG | TEMPERATURE: 98.4 F | OXYGEN SATURATION: 96 %

## 2023-09-22 VITALS — OXYGEN SATURATION: 100 %

## 2023-09-22 VITALS — OXYGEN SATURATION: 96 %

## 2023-09-22 LAB
BUN SERPL-MCNC: 29 MG/DL (ref 7–18)
CALCIUM SERPL-MCNC: 9.3 MG/DL (ref 8.5–10.1)
CHLORIDE SERPL-SCNC: 114 MMOL/L (ref 98–107)
CO2 SERPL-SCNC: 29 MMOL/L (ref 21–32)
CREAT SERPL-MCNC: 0.8 MG/DL (ref 0.6–1.3)
GLUCOSE SERPL-MCNC: 180 MG/DL (ref 74–106)
POTASSIUM SERPL-SCNC: 3.1 MMOL/L (ref 3.5–5.1)
SODIUM SERPL-SCNC: 150 MMOL/L (ref 136–145)

## 2023-09-22 RX ADMIN — Medication SCH MG: at 02:02

## 2023-09-22 RX ADMIN — DOCUSATE SODIUM SCH MG: 50 LIQUID ORAL at 08:41

## 2023-09-22 RX ADMIN — ACETYLCYSTEINE SCH MG: 100 INHALANT RESPIRATORY (INHALATION) at 14:07

## 2023-09-22 RX ADMIN — METRONIDAZOLE SCH MG: 500 TABLET ORAL at 12:52

## 2023-09-22 RX ADMIN — OXYCODONE HYDROCHLORIDE AND ACETAMINOPHEN SCH MG: 500 TABLET ORAL at 08:43

## 2023-09-22 RX ADMIN — ACETYLCYSTEINE SCH MG: 100 INHALANT RESPIRATORY (INHALATION) at 02:03

## 2023-09-22 RX ADMIN — TRAMADOL HYDROCHLORIDE SCH MG: 50 TABLET, FILM COATED ORAL at 08:43

## 2023-09-22 RX ADMIN — ACETYLCYSTEINE SCH MG: 100 INHALANT RESPIRATORY (INHALATION) at 08:00

## 2023-09-22 RX ADMIN — Medication SCH APPLIC: at 08:48

## 2023-09-22 RX ADMIN — ALBUTEROL SULFATE SCH MG: 1.25 SOLUTION RESPIRATORY (INHALATION) at 07:59

## 2023-09-22 RX ADMIN — THERA TABS SCH UDTAB: TAB at 08:41

## 2023-09-22 RX ADMIN — Medication SCH MG: at 13:30

## 2023-09-22 RX ADMIN — ENOXAPARIN SODIUM SCH MG: 40 INJECTION SUBCUTANEOUS at 08:48

## 2023-09-22 RX ADMIN — ALBUTEROL SULFATE SCH MG: 1.25 SOLUTION RESPIRATORY (INHALATION) at 02:02

## 2023-09-22 RX ADMIN — Medication SCH MG: at 08:41

## 2023-09-22 RX ADMIN — HYDROCORTISONE SODIUM SUCCINATE SCH MG: 100 INJECTION, POWDER, FOR SOLUTION INTRAMUSCULAR; INTRAVASCULAR at 03:11

## 2023-09-22 RX ADMIN — CHLORHEXIDINE GLUCONATE 0.12% ORAL RINSE SCH ML: 1.2 LIQUID ORAL at 08:40

## 2023-09-22 RX ADMIN — Medication SCH MG: at 07:59

## 2023-09-22 RX ADMIN — MEROPENEM SCH MLS/HR: 500 INJECTION INTRAVENOUS at 08:15

## 2023-09-22 RX ADMIN — METRONIDAZOLE SCH MG: 500 TABLET ORAL at 05:56

## 2023-09-22 RX ADMIN — PANTOPRAZOLE SODIUM SCH MG: 40 GRANULE, DELAYED RELEASE ORAL at 08:41

## 2023-09-22 RX ADMIN — ALBUTEROL SULFATE SCH MG: 1.25 SOLUTION RESPIRATORY (INHALATION) at 13:30

## 2023-09-22 RX ADMIN — HYDROCORTISONE SODIUM SUCCINATE SCH MG: 100 INJECTION, POWDER, FOR SOLUTION INTRAMUSCULAR; INTRAVASCULAR at 09:24

## 2025-06-05 ENCOUNTER — HOSPITAL ENCOUNTER (EMERGENCY)
Dept: HOSPITAL 54 - ER | Age: 80
LOS: 1 days | Discharge: NURSING, REHAB OR LONG-TERM CARE | End: 2025-06-06
Payer: MEDICARE

## 2025-06-05 VITALS — WEIGHT: 135 LBS | BODY MASS INDEX: 24.84 KG/M2 | HEIGHT: 62 IN

## 2025-06-05 VITALS — OXYGEN SATURATION: 99 % | SYSTOLIC BLOOD PRESSURE: 134 MMHG | TEMPERATURE: 98.9 F | DIASTOLIC BLOOD PRESSURE: 64 MMHG
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
## 2025-06-05 DIAGNOSIS — E78.5: ICD-10-CM

## 2025-06-05 DIAGNOSIS — F17.200: ICD-10-CM

## 2025-06-05 DIAGNOSIS — F03.90: ICD-10-CM

## 2025-06-05 DIAGNOSIS — I10: ICD-10-CM
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
## 2025-06-05 DIAGNOSIS — M85.88: ICD-10-CM
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
## 2025-06-05 DIAGNOSIS — M48.061: ICD-10-CM

## 2025-06-05 DIAGNOSIS — J44.9: ICD-10-CM

## 2025-06-05 DIAGNOSIS — Z79.82: ICD-10-CM

## 2025-06-05 DIAGNOSIS — Z79.899: ICD-10-CM

## 2025-06-05 DIAGNOSIS — K46.9: ICD-10-CM

## 2025-06-05 DIAGNOSIS — M41.9: Primary | ICD-10-CM
